# Patient Record
Sex: FEMALE | Race: WHITE | Employment: PART TIME | URBAN - METROPOLITAN AREA
[De-identification: names, ages, dates, MRNs, and addresses within clinical notes are randomized per-mention and may not be internally consistent; named-entity substitution may affect disease eponyms.]

---

## 2017-01-23 RX ORDER — CARBAMAZEPINE 200 MG/1
CAPSULE, EXTENDED RELEASE ORAL
Qty: 120 CAPSULE | Refills: 5 | Status: SHIPPED | OUTPATIENT
Start: 2017-01-23 | End: 2017-06-08 | Stop reason: SDUPTHER

## 2017-03-07 DIAGNOSIS — G43.009 MIGRAINE WITHOUT AURA AND WITHOUT STATUS MIGRAINOSUS, NOT INTRACTABLE: Primary | ICD-10-CM

## 2017-03-07 RX ORDER — BUTALBITAL, ACETAMINOPHEN AND CAFFEINE 50; 325; 40 MG/1; MG/1; MG/1
1 TABLET ORAL EVERY 6 HOURS PRN
Qty: 90 TABLET | Refills: 3 | Status: SHIPPED | OUTPATIENT
Start: 2017-03-07 | End: 2018-09-05 | Stop reason: ALTCHOICE

## 2017-03-07 RX ORDER — AMITRIPTYLINE HYDROCHLORIDE 25 MG/1
25 TABLET, FILM COATED ORAL NIGHTLY
Qty: 30 TABLET | Refills: 3 | Status: SHIPPED | OUTPATIENT
Start: 2017-03-07 | End: 2017-04-05

## 2017-04-05 ENCOUNTER — OFFICE VISIT (OUTPATIENT)
Dept: FAMILY MEDICINE CLINIC | Age: 32
End: 2017-04-05
Payer: MEDICARE

## 2017-04-05 ENCOUNTER — HOSPITAL ENCOUNTER (OUTPATIENT)
Age: 32
Setting detail: SPECIMEN
Discharge: HOME | End: 2017-04-05
Payer: MEDICARE

## 2017-04-05 VITALS
OXYGEN SATURATION: 98 % | DIASTOLIC BLOOD PRESSURE: 60 MMHG | HEART RATE: 100 BPM | TEMPERATURE: 98.2 F | SYSTOLIC BLOOD PRESSURE: 102 MMHG | WEIGHT: 112 LBS | BODY MASS INDEX: 20.49 KG/M2

## 2017-04-05 DIAGNOSIS — G40.909 SEIZURE DISORDER (HCC): ICD-10-CM

## 2017-04-05 DIAGNOSIS — G43.009 MIGRAINE WITHOUT AURA AND WITHOUT STATUS MIGRAINOSUS, NOT INTRACTABLE: Primary | ICD-10-CM

## 2017-04-05 LAB
CARBAMAZEPINE DATE LAST DOSE: ABNORMAL
CARBAMAZEPINE DOSE AMOUNT: ABNORMAL
CARBAMAZEPINE DOSE TIME: ABNORMAL
CARBAMAZEPINE LEVEL: <2.5 UG/ML (ref 4–12)

## 2017-04-05 PROCEDURE — 1036F TOBACCO NON-USER: CPT | Performed by: NURSE PRACTITIONER

## 2017-04-05 PROCEDURE — G8427 DOCREV CUR MEDS BY ELIG CLIN: HCPCS | Performed by: NURSE PRACTITIONER

## 2017-04-05 PROCEDURE — 96372 THER/PROPH/DIAG INJ SC/IM: CPT | Performed by: NURSE PRACTITIONER

## 2017-04-05 PROCEDURE — 99213 OFFICE O/P EST LOW 20 MIN: CPT | Performed by: NURSE PRACTITIONER

## 2017-04-05 PROCEDURE — G8420 CALC BMI NORM PARAMETERS: HCPCS | Performed by: NURSE PRACTITIONER

## 2017-04-05 PROCEDURE — G8599 NO ASA/ANTIPLAT THER USE RNG: HCPCS | Performed by: NURSE PRACTITIONER

## 2017-04-05 RX ORDER — KETOROLAC TROMETHAMINE 30 MG/ML
30 INJECTION, SOLUTION INTRAMUSCULAR; INTRAVENOUS ONCE
Status: COMPLETED | OUTPATIENT
Start: 2017-04-05 | End: 2017-04-05

## 2017-04-05 RX ORDER — PROPRANOLOL HYDROCHLORIDE 80 MG/1
80 CAPSULE, EXTENDED RELEASE ORAL NIGHTLY
Qty: 30 CAPSULE | Refills: 3 | Status: SHIPPED | OUTPATIENT
Start: 2017-04-05 | End: 2017-04-07

## 2017-04-05 RX ORDER — SUMATRIPTAN 20 MG/1
SPRAY NASAL
Qty: 1 INHALER | Refills: 2 | Status: SHIPPED | OUTPATIENT
Start: 2017-04-05 | End: 2017-04-07

## 2017-04-05 RX ORDER — ELETRIPTAN HYDROBROMIDE 20 MG/1
TABLET, FILM COATED ORAL
Qty: 6 TABLET | OUTPATIENT
Start: 2017-04-05

## 2017-04-05 RX ORDER — NAPROXEN 500 MG/1
500 TABLET ORAL 2 TIMES DAILY WITH MEALS
Qty: 60 TABLET | Refills: 3 | Status: SHIPPED | OUTPATIENT
Start: 2017-04-05 | End: 2017-06-27 | Stop reason: SDUPTHER

## 2017-04-05 RX ADMIN — KETOROLAC TROMETHAMINE 30 MG: 30 INJECTION, SOLUTION INTRAMUSCULAR; INTRAVENOUS at 10:35

## 2017-04-05 ASSESSMENT — ENCOUNTER SYMPTOMS
RESPIRATORY NEGATIVE: 1
ALLERGIC/IMMUNOLOGIC NEGATIVE: 1
ABDOMINAL PAIN: 0
WHEEZING: 0
GASTROINTESTINAL NEGATIVE: 1
EYES NEGATIVE: 1
DIARRHEA: 0
CONSTIPATION: 0
COUGH: 0
SHORTNESS OF BREATH: 0

## 2017-04-07 ENCOUNTER — HOSPITAL ENCOUNTER (EMERGENCY)
Age: 32
Discharge: HOME | End: 2017-04-07
Attending: EMERGENCY MEDICINE
Payer: MEDICARE

## 2017-04-07 ENCOUNTER — TELEPHONE (OUTPATIENT)
Dept: FAMILY MEDICINE CLINIC | Age: 32
End: 2017-04-07

## 2017-04-07 VITALS
DIASTOLIC BLOOD PRESSURE: 77 MMHG | RESPIRATION RATE: 14 BRPM | HEART RATE: 80 BPM | BODY MASS INDEX: 20.49 KG/M2 | SYSTOLIC BLOOD PRESSURE: 110 MMHG | TEMPERATURE: 98 F | WEIGHT: 112 LBS | OXYGEN SATURATION: 96 %

## 2017-04-07 DIAGNOSIS — R26.9 NEUROLOGIC GAIT DISORDER: ICD-10-CM

## 2017-04-07 DIAGNOSIS — G43.009 MIGRAINE WITHOUT AURA AND WITHOUT STATUS MIGRAINOSUS, NOT INTRACTABLE: Primary | ICD-10-CM

## 2017-04-07 DIAGNOSIS — G43.919 INTRACTABLE MIGRAINE WITHOUT STATUS MIGRAINOSUS, UNSPECIFIED MIGRAINE TYPE: Primary | ICD-10-CM

## 2017-04-07 PROCEDURE — 2580000003 HC RX 258: Performed by: EMERGENCY MEDICINE

## 2017-04-07 PROCEDURE — 6360000002 HC RX W HCPCS: Performed by: EMERGENCY MEDICINE

## 2017-04-07 PROCEDURE — 99283 EMERGENCY DEPT VISIT LOW MDM: CPT

## 2017-04-07 PROCEDURE — 96374 THER/PROPH/DIAG INJ IV PUSH: CPT

## 2017-04-07 PROCEDURE — 96375 TX/PRO/DX INJ NEW DRUG ADDON: CPT

## 2017-04-07 RX ORDER — 0.9 % SODIUM CHLORIDE 0.9 %
1000 INTRAVENOUS SOLUTION INTRAVENOUS ONCE
Status: COMPLETED | OUTPATIENT
Start: 2017-04-07 | End: 2017-04-07

## 2017-04-07 RX ORDER — AMITRIPTYLINE HYDROCHLORIDE 25 MG/1
25 TABLET, FILM COATED ORAL NIGHTLY
COMMUNITY
End: 2017-05-11

## 2017-04-07 RX ORDER — DEXAMETHASONE SODIUM PHOSPHATE 4 MG/ML
10 INJECTION, SOLUTION INTRA-ARTICULAR; INTRALESIONAL; INTRAMUSCULAR; INTRAVENOUS; SOFT TISSUE ONCE
Status: COMPLETED | OUTPATIENT
Start: 2017-04-07 | End: 2017-04-07

## 2017-04-07 RX ORDER — KETOROLAC TROMETHAMINE 30 MG/ML
30 INJECTION, SOLUTION INTRAMUSCULAR; INTRAVENOUS ONCE
Status: COMPLETED | OUTPATIENT
Start: 2017-04-07 | End: 2017-04-07

## 2017-04-07 RX ORDER — DIPHENHYDRAMINE HYDROCHLORIDE 50 MG/ML
25 INJECTION INTRAMUSCULAR; INTRAVENOUS ONCE
Status: COMPLETED | OUTPATIENT
Start: 2017-04-07 | End: 2017-04-07

## 2017-04-07 RX ADMIN — PROCHLORPERAZINE EDISYLATE 10 MG: 5 INJECTION INTRAMUSCULAR; INTRAVENOUS at 14:52

## 2017-04-07 RX ADMIN — KETOROLAC TROMETHAMINE 30 MG: 30 INJECTION, SOLUTION INTRAMUSCULAR at 14:50

## 2017-04-07 RX ADMIN — SODIUM CHLORIDE 1000 ML: 9 INJECTION, SOLUTION INTRAVENOUS at 14:53

## 2017-04-07 RX ADMIN — DIPHENHYDRAMINE HYDROCHLORIDE 25 MG: 50 INJECTION, SOLUTION INTRAMUSCULAR; INTRAVENOUS at 14:46

## 2017-04-07 RX ADMIN — DEXAMETHASONE SODIUM PHOSPHATE 10 MG: 4 INJECTION, SOLUTION INTRAMUSCULAR; INTRAVENOUS at 14:41

## 2017-04-07 ASSESSMENT — PAIN SCALES - GENERAL
PAINLEVEL_OUTOF10: 1
PAINLEVEL_OUTOF10: 0
PAINLEVEL_OUTOF10: 0
PAINLEVEL_OUTOF10: 7

## 2017-04-07 ASSESSMENT — PAIN SCALES - WONG BAKER: WONGBAKER_NUMERICALRESPONSE: 2

## 2017-04-07 ASSESSMENT — ENCOUNTER SYMPTOMS
EYE REDNESS: 0
COUGH: 0
RHINORRHEA: 0
NAUSEA: 1
SHORTNESS OF BREATH: 0
BACK PAIN: 0
ABDOMINAL PAIN: 0
EYE PAIN: 0
VOMITING: 0

## 2017-04-07 ASSESSMENT — PAIN DESCRIPTION - LOCATION: LOCATION: HEAD

## 2017-04-07 ASSESSMENT — PAIN DESCRIPTION - PAIN TYPE: TYPE: ACUTE PAIN

## 2017-04-13 RX ORDER — ELETRIPTAN HYDROBROMIDE 20 MG/1
TABLET, FILM COATED ORAL
Qty: 6 TABLET | Refills: 2 | Status: SHIPPED | OUTPATIENT
Start: 2017-04-13 | End: 2017-07-06 | Stop reason: SDUPTHER

## 2017-05-01 ENCOUNTER — OFFICE VISIT (OUTPATIENT)
Dept: FAMILY MEDICINE CLINIC | Age: 32
End: 2017-05-01
Payer: MEDICARE

## 2017-05-01 VITALS
OXYGEN SATURATION: 98 % | HEART RATE: 94 BPM | WEIGHT: 111 LBS | DIASTOLIC BLOOD PRESSURE: 68 MMHG | BODY MASS INDEX: 20.3 KG/M2 | SYSTOLIC BLOOD PRESSURE: 112 MMHG | TEMPERATURE: 98.3 F

## 2017-05-01 DIAGNOSIS — Z98.2 HISTORY OF BRAIN SHUNT: ICD-10-CM

## 2017-05-01 DIAGNOSIS — G93.89 ENCEPHALOMALACIA ON IMAGING STUDY: ICD-10-CM

## 2017-05-01 DIAGNOSIS — G93.89 CEREBRAL VENTRICULOMEGALY: ICD-10-CM

## 2017-05-01 DIAGNOSIS — G43.109 MIGRAINE WITH AURA AND WITHOUT STATUS MIGRAINOSUS, NOT INTRACTABLE: ICD-10-CM

## 2017-05-01 DIAGNOSIS — H65.03 BILATERAL ACUTE SEROUS OTITIS MEDIA, RECURRENCE NOT SPECIFIED: Primary | ICD-10-CM

## 2017-05-01 PROCEDURE — 99213 OFFICE O/P EST LOW 20 MIN: CPT | Performed by: NURSE PRACTITIONER

## 2017-05-01 PROCEDURE — 1036F TOBACCO NON-USER: CPT | Performed by: NURSE PRACTITIONER

## 2017-05-01 PROCEDURE — G8599 NO ASA/ANTIPLAT THER USE RNG: HCPCS | Performed by: NURSE PRACTITIONER

## 2017-05-01 PROCEDURE — G8427 DOCREV CUR MEDS BY ELIG CLIN: HCPCS | Performed by: NURSE PRACTITIONER

## 2017-05-01 PROCEDURE — G8420 CALC BMI NORM PARAMETERS: HCPCS | Performed by: NURSE PRACTITIONER

## 2017-05-01 ASSESSMENT — ENCOUNTER SYMPTOMS
ABDOMINAL PAIN: 0
COUGH: 0
DIARRHEA: 0
ALLERGIC/IMMUNOLOGIC NEGATIVE: 1
WHEEZING: 0
SHORTNESS OF BREATH: 0
EYES NEGATIVE: 1
CONSTIPATION: 0
GASTROINTESTINAL NEGATIVE: 1
RESPIRATORY NEGATIVE: 1

## 2017-05-01 ASSESSMENT — PATIENT HEALTH QUESTIONNAIRE - PHQ9
2. FEELING DOWN, DEPRESSED OR HOPELESS: 0
SUM OF ALL RESPONSES TO PHQ9 QUESTIONS 1 & 2: 0
SUM OF ALL RESPONSES TO PHQ QUESTIONS 1-9: 0
1. LITTLE INTEREST OR PLEASURE IN DOING THINGS: 0

## 2017-05-11 ENCOUNTER — OFFICE VISIT (OUTPATIENT)
Dept: OBGYN CLINIC | Age: 32
End: 2017-05-11
Payer: MEDICARE

## 2017-05-11 VITALS
RESPIRATION RATE: 16 BRPM | DIASTOLIC BLOOD PRESSURE: 68 MMHG | SYSTOLIC BLOOD PRESSURE: 104 MMHG | WEIGHT: 114 LBS | HEIGHT: 62 IN | HEART RATE: 72 BPM | BODY MASS INDEX: 20.98 KG/M2

## 2017-05-11 DIAGNOSIS — Z30.09 FAMILY PLANNING EDUCATION, GUIDANCE, AND COUNSELING: Primary | ICD-10-CM

## 2017-05-11 PROBLEM — Z86.73 H/O: STROKE: Status: ACTIVE | Noted: 2017-05-11

## 2017-05-11 PROCEDURE — G8420 CALC BMI NORM PARAMETERS: HCPCS | Performed by: NURSE PRACTITIONER

## 2017-05-11 PROCEDURE — G8599 NO ASA/ANTIPLAT THER USE RNG: HCPCS | Performed by: NURSE PRACTITIONER

## 2017-05-11 PROCEDURE — 4004F PT TOBACCO SCREEN RCVD TLK: CPT | Performed by: NURSE PRACTITIONER

## 2017-05-11 PROCEDURE — 99213 OFFICE O/P EST LOW 20 MIN: CPT | Performed by: NURSE PRACTITIONER

## 2017-05-11 PROCEDURE — G8427 DOCREV CUR MEDS BY ELIG CLIN: HCPCS | Performed by: NURSE PRACTITIONER

## 2017-06-08 RX ORDER — CARBAMAZEPINE 200 MG/1
CAPSULE, EXTENDED RELEASE ORAL
Qty: 120 CAPSULE | Refills: 0 | Status: SHIPPED | OUTPATIENT
Start: 2017-06-08 | End: 2017-08-03 | Stop reason: SDUPTHER

## 2017-06-27 RX ORDER — NAPROXEN 500 MG/1
TABLET ORAL
Qty: 60 TABLET | Refills: 3 | Status: SHIPPED | OUTPATIENT
Start: 2017-06-27 | End: 2017-11-28 | Stop reason: SDUPTHER

## 2017-06-27 RX ORDER — AMITRIPTYLINE HYDROCHLORIDE 25 MG/1
TABLET, FILM COATED ORAL
Qty: 30 TABLET | Refills: 5 | OUTPATIENT
Start: 2017-06-27

## 2017-06-27 RX ORDER — PROPRANOLOL HYDROCHLORIDE 80 MG/1
CAPSULE, EXTENDED RELEASE ORAL
Qty: 30 CAPSULE | Refills: 5 | Status: SHIPPED | OUTPATIENT
Start: 2017-06-27 | End: 2018-07-18 | Stop reason: SDUPTHER

## 2017-07-03 RX ORDER — SUMATRIPTAN 20 MG/1
SPRAY NASAL
Qty: 1 INHALER | Refills: 1 | Status: SHIPPED | OUTPATIENT
Start: 2017-07-03 | End: 2017-08-22 | Stop reason: SDUPTHER

## 2017-07-06 RX ORDER — ELETRIPTAN HYDROBROMIDE 20 MG/1
TABLET, FILM COATED ORAL
Qty: 6 TABLET | Refills: 3 | Status: SHIPPED | OUTPATIENT
Start: 2017-07-06 | End: 2018-09-05 | Stop reason: ALTCHOICE

## 2017-08-03 RX ORDER — CARBAMAZEPINE 200 MG/1
CAPSULE, EXTENDED RELEASE ORAL
Qty: 120 CAPSULE | Refills: 0 | Status: SHIPPED | OUTPATIENT
Start: 2017-08-03 | End: 2017-09-01 | Stop reason: SDUPTHER

## 2017-08-24 RX ORDER — SUMATRIPTAN 20 MG/1
SPRAY NASAL
Qty: 1 INHALER | Refills: 0 | Status: SHIPPED | OUTPATIENT
Start: 2017-08-24 | End: 2017-11-01 | Stop reason: SDUPTHER

## 2017-09-07 RX ORDER — CARBAMAZEPINE 200 MG/1
CAPSULE, EXTENDED RELEASE ORAL
Qty: 60 CAPSULE | Refills: 1 | Status: SHIPPED | OUTPATIENT
Start: 2017-09-07 | End: 2017-11-15 | Stop reason: SDUPTHER

## 2017-11-02 RX ORDER — SUMATRIPTAN 20 MG/1
SPRAY NASAL
Qty: 1 INHALER | Refills: 2 | Status: SHIPPED | OUTPATIENT
Start: 2017-11-02 | End: 2017-12-26 | Stop reason: SDUPTHER

## 2017-11-02 NOTE — TELEPHONE ENCOUNTER
Last refill 8/28/17  Last seen 5/1/17    Next Visit Date:  No future appointments.     Health Maintenance   Topic Date Due    HIV screen  12/02/1998    Pneumococcal med risk (1 of 1 - PPSV23) 12/02/2002    Flu vaccine (1) 09/01/2017    Cervical cancer screen  04/05/2018 (Originally 12/2/2004)    DTaP/Tdap/Td vaccine (2 - Td) 04/05/2022       No results found for: LABA1C          ( goal A1C is < 7)   No results found for: LABMICR  No results found for: LDLCHOLESTEROL, LDLCALC    (goal LDL is <100)   AST (U/L)   Date Value   08/03/2016 18     ALT (U/L)   Date Value   08/03/2016 19     BUN (mg/dL)   Date Value   08/03/2016 16     BP Readings from Last 3 Encounters:   05/11/17 104/68   05/01/17 112/68   04/07/17 110/77          (goal 120/80)    All Future Testing planned in [facility]              Patient Active Problem List:     Neurologic gait disorder     Cerebrovascular accident (CVA) ([address]     Seizure disorder ([address]     Migraine without aura and without status migrainosus, not intractable     Foot drop, left foot     History of brain shunt     Cerebral ventriculomegaly     Encephalomalacia on imaging study     H/O: stroke

## 2017-11-16 RX ORDER — CARBAMAZEPINE 200 MG/1
CAPSULE, EXTENDED RELEASE ORAL
Qty: 360 CAPSULE | Refills: 1 | Status: SHIPPED | OUTPATIENT
Start: 2017-11-16 | End: 2017-12-26 | Stop reason: SDUPTHER

## 2017-11-16 NOTE — TELEPHONE ENCOUNTER
Last visit 5/11/17  Next Visit Date:  No future appointments.     Health Maintenance   Topic Date Due    HIV screen  12/02/1998    Pneumococcal med risk (1 of 1 - PPSV23) 12/02/2002    Flu vaccine (1) 09/01/2017    Cervical cancer screen  04/05/2018 (Originally 12/2/2004)    DTaP/Tdap/Td vaccine (2 - Td) 04/05/2022       No results found for: LABA1C          ( goal A1C is < 7)   No results found for: LABMICR  No results found for: LDLCHOLESTEROL, LDLCALC    (goal LDL is <100)   AST (U/L)   Date Value   08/03/2016 18     ALT (U/L)   Date Value   08/03/2016 19     BUN (mg/dL)   Date Value   08/03/2016 16     BP Readings from Last 3 Encounters:   05/11/17 104/68   05/01/17 112/68   04/07/17 110/77          (goal 120/80)    All Future Testing planned in [facility]              Patient Active Problem List:     Neurologic gait disorder     Cerebrovascular accident (CVA) ([address]     Seizure disorder ([address]     Migraine without aura and without status migrainosus, not intractable     Foot drop, left foot     History of brain shunt     Cerebral ventriculomegaly     Encephalomalacia on imaging study     H/O: stroke

## 2017-11-29 RX ORDER — NAPROXEN 500 MG/1
TABLET ORAL
Qty: 60 TABLET | Refills: 0 | Status: SHIPPED | OUTPATIENT
Start: 2017-11-29 | End: 2018-06-21 | Stop reason: SDUPTHER

## 2017-12-26 RX ORDER — NAPROXEN 500 MG/1
TABLET ORAL
Qty: 60 TABLET | OUTPATIENT
Start: 2017-12-26

## 2017-12-26 NOTE — TELEPHONE ENCOUNTER
LAST APPT 5/1/17   NEEDS APPOINTMENT  Next Visit Date:  No future appointments.     Health Maintenance   Topic Date Due    HIV screen  12/02/1998    Pneumococcal med risk (1 of 1 - PPSV23) 12/02/2002    Flu vaccine (1) 09/01/2017    Cervical cancer screen  04/05/2018 (Originally 12/2/2004)    DTaP/Tdap/Td vaccine (2 - Td) 04/05/2022       No results found for: LABA1C          ( goal A1C is < 7)   No results found for: LABMICR  No results found for: LDLCHOLESTEROL, LDLCALC    (goal LDL is <100)   AST (U/L)   Date Value   08/03/2016 18     ALT (U/L)   Date Value   08/03/2016 19     BUN (mg/dL)   Date Value   08/03/2016 16     BP Readings from Last 3 Encounters:   05/11/17 104/68   05/01/17 112/68   04/07/17 110/77          (goal 120/80)    All Future Testing planned in [facility]              Patient Active Problem List:     Neurologic gait disorder     Cerebrovascular accident (CVA) ([address]     Seizure disorder ([address]     Migraine without aura and without status migrainosus, not intractable     Foot drop, left foot     History of brain shunt     Cerebral ventriculomegaly     Encephalomalacia on imaging study     H/O: stroke

## 2017-12-27 RX ORDER — NAPROXEN SODIUM 550 MG/1
TABLET ORAL
Qty: 60 TABLET | Refills: 1 | Status: SHIPPED | OUTPATIENT
Start: 2017-12-27 | End: 2018-06-19 | Stop reason: SDUPTHER

## 2017-12-27 RX ORDER — AMITRIPTYLINE HYDROCHLORIDE 25 MG/1
TABLET, FILM COATED ORAL
Qty: 30 TABLET | Refills: 5 | Status: SHIPPED | OUTPATIENT
Start: 2017-12-27 | End: 2018-07-13

## 2017-12-27 RX ORDER — CARBAMAZEPINE 200 MG/1
CAPSULE, EXTENDED RELEASE ORAL
Qty: 120 CAPSULE | Refills: 5 | Status: SHIPPED | OUTPATIENT
Start: 2017-12-27 | End: 2018-10-09 | Stop reason: SDUPTHER

## 2017-12-27 RX ORDER — SUMATRIPTAN 20 MG/1
SPRAY NASAL
Qty: 1 INHALER | Refills: 1 | Status: SHIPPED | OUTPATIENT
Start: 2017-12-27 | End: 2018-06-21 | Stop reason: SDUPTHER

## 2018-05-30 ENCOUNTER — TELEPHONE (OUTPATIENT)
Dept: FAMILY MEDICINE CLINIC | Age: 33
End: 2018-05-30

## 2018-06-19 ENCOUNTER — OFFICE VISIT (OUTPATIENT)
Dept: FAMILY MEDICINE CLINIC | Age: 33
End: 2018-06-19
Payer: COMMERCIAL

## 2018-06-19 VITALS
DIASTOLIC BLOOD PRESSURE: 78 MMHG | BODY MASS INDEX: 21.38 KG/M2 | TEMPERATURE: 97.9 F | WEIGHT: 116.2 LBS | OXYGEN SATURATION: 99 % | HEART RATE: 89 BPM | HEIGHT: 62 IN | SYSTOLIC BLOOD PRESSURE: 120 MMHG

## 2018-06-19 DIAGNOSIS — Z11.4 SCREENING FOR HIV (HUMAN IMMUNODEFICIENCY VIRUS): ICD-10-CM

## 2018-06-19 DIAGNOSIS — G43.009 MIGRAINE WITHOUT AURA AND WITHOUT STATUS MIGRAINOSUS, NOT INTRACTABLE: Primary | ICD-10-CM

## 2018-06-19 DIAGNOSIS — Z13.220 SCREENING FOR HYPERLIPIDEMIA: ICD-10-CM

## 2018-06-19 DIAGNOSIS — Z79.899 MEDICATION MANAGEMENT: ICD-10-CM

## 2018-06-19 PROCEDURE — G8599 NO ASA/ANTIPLAT THER USE RNG: HCPCS | Performed by: NURSE PRACTITIONER

## 2018-06-19 PROCEDURE — G8427 DOCREV CUR MEDS BY ELIG CLIN: HCPCS | Performed by: NURSE PRACTITIONER

## 2018-06-19 PROCEDURE — 4004F PT TOBACCO SCREEN RCVD TLK: CPT | Performed by: NURSE PRACTITIONER

## 2018-06-19 PROCEDURE — G8420 CALC BMI NORM PARAMETERS: HCPCS | Performed by: NURSE PRACTITIONER

## 2018-06-19 PROCEDURE — 99213 OFFICE O/P EST LOW 20 MIN: CPT | Performed by: NURSE PRACTITIONER

## 2018-06-19 ASSESSMENT — ENCOUNTER SYMPTOMS
CONSTIPATION: 0
COUGH: 0
WHEEZING: 0
RESPIRATORY NEGATIVE: 1
ABDOMINAL PAIN: 0
SHORTNESS OF BREATH: 0
DIARRHEA: 0
EYES NEGATIVE: 1
GASTROINTESTINAL NEGATIVE: 1
ALLERGIC/IMMUNOLOGIC NEGATIVE: 1

## 2018-06-19 ASSESSMENT — PATIENT HEALTH QUESTIONNAIRE - PHQ9
SUM OF ALL RESPONSES TO PHQ9 QUESTIONS 1 & 2: 0
SUM OF ALL RESPONSES TO PHQ QUESTIONS 1-9: 0
2. FEELING DOWN, DEPRESSED OR HOPELESS: 0
1. LITTLE INTEREST OR PLEASURE IN DOING THINGS: 0

## 2018-06-21 RX ORDER — SUMATRIPTAN 20 MG/1
SPRAY NASAL
Qty: 1 INHALER | Refills: 1 | Status: SHIPPED | OUTPATIENT
Start: 2018-06-21 | End: 2018-07-18 | Stop reason: SDUPTHER

## 2018-06-21 RX ORDER — NAPROXEN 500 MG/1
TABLET ORAL
Qty: 60 TABLET | Refills: 1 | Status: SHIPPED | OUTPATIENT
Start: 2018-06-21 | End: 2018-09-05 | Stop reason: ALTCHOICE

## 2018-06-25 ENCOUNTER — HOSPITAL ENCOUNTER (OUTPATIENT)
Age: 33
Discharge: HOME | End: 2018-06-25
Payer: COMMERCIAL

## 2018-06-25 DIAGNOSIS — Z11.4 SCREENING FOR HIV (HUMAN IMMUNODEFICIENCY VIRUS): ICD-10-CM

## 2018-06-25 DIAGNOSIS — G43.009 MIGRAINE WITHOUT AURA AND WITHOUT STATUS MIGRAINOSUS, NOT INTRACTABLE: ICD-10-CM

## 2018-06-25 DIAGNOSIS — Z79.899 MEDICATION MANAGEMENT: ICD-10-CM

## 2018-06-25 DIAGNOSIS — Z13.220 SCREENING FOR HYPERLIPIDEMIA: ICD-10-CM

## 2018-06-25 LAB
ANION GAP SERPL CALCULATED.3IONS-SCNC: 10 MMOL/L (ref 9–17)
BUN BLDV-MCNC: 16 MG/DL (ref 6–20)
BUN/CREAT BLD: NORMAL (ref 9–20)
CALCIUM SERPL-MCNC: 9.1 MG/DL (ref 8.6–10.4)
CHLORIDE BLD-SCNC: 99 MMOL/L (ref 98–107)
CHOLESTEROL/HDL RATIO: 3.9
CHOLESTEROL: 197 MG/DL
CO2: 27 MMOL/L (ref 20–31)
CREAT SERPL-MCNC: 0.56 MG/DL (ref 0.5–0.9)
GFR AFRICAN AMERICAN: >60 ML/MIN
GFR NON-AFRICAN AMERICAN: >60 ML/MIN
GFR SERPL CREATININE-BSD FRML MDRD: NORMAL ML/MIN/{1.73_M2}
GFR SERPL CREATININE-BSD FRML MDRD: NORMAL ML/MIN/{1.73_M2}
GLUCOSE BLD-MCNC: 84 MG/DL (ref 70–99)
HCT VFR BLD CALC: 45.5 % (ref 36–46)
HDLC SERPL-MCNC: 51 MG/DL
HEMOGLOBIN: 15.6 G/DL (ref 12–16)
HIV AG/AB: NONREACTIVE
LDL CHOLESTEROL: 131 MG/DL (ref 0–130)
MCH RBC QN AUTO: 30.7 PG (ref 26–34)
MCHC RBC AUTO-ENTMCNC: 34.3 G/DL (ref 31–37)
MCV RBC AUTO: 89.4 FL (ref 80–100)
NRBC AUTOMATED: NORMAL PER 100 WBC
PDW BLD-RTO: 12.6 % (ref 11.5–14.9)
PLATELET # BLD: 229 K/UL (ref 150–450)
PMV BLD AUTO: 8 FL (ref 6–12)
POTASSIUM SERPL-SCNC: 4.4 MMOL/L (ref 3.7–5.3)
RBC # BLD: 5.08 M/UL (ref 4–5.2)
SODIUM BLD-SCNC: 136 MMOL/L (ref 135–144)
TRIGL SERPL-MCNC: 76 MG/DL
VLDLC SERPL CALC-MCNC: ABNORMAL MG/DL (ref 1–30)
WBC # BLD: 5 K/UL (ref 3.5–11)

## 2018-06-25 PROCEDURE — 36415 COLL VENOUS BLD VENIPUNCTURE: CPT

## 2018-06-25 PROCEDURE — 87389 HIV-1 AG W/HIV-1&-2 AB AG IA: CPT

## 2018-06-25 PROCEDURE — 80048 BASIC METABOLIC PNL TOTAL CA: CPT

## 2018-06-25 PROCEDURE — 85027 COMPLETE CBC AUTOMATED: CPT

## 2018-06-25 PROCEDURE — 80061 LIPID PANEL: CPT

## 2018-07-13 RX ORDER — AMITRIPTYLINE HYDROCHLORIDE 25 MG/1
TABLET, FILM COATED ORAL
Qty: 30 TABLET | Refills: 5 | Status: SHIPPED | OUTPATIENT
Start: 2018-07-13 | End: 2018-09-05 | Stop reason: ALTCHOICE

## 2018-07-19 RX ORDER — PROPRANOLOL HYDROCHLORIDE 80 MG/1
CAPSULE, EXTENDED RELEASE ORAL
Qty: 30 CAPSULE | Refills: 5 | Status: SHIPPED | OUTPATIENT
Start: 2018-07-19 | End: 2018-09-05 | Stop reason: ALTCHOICE

## 2018-07-19 RX ORDER — SUMATRIPTAN 20 MG/1
SPRAY NASAL
Qty: 1 INHALER | Refills: 5 | Status: SHIPPED | OUTPATIENT
Start: 2018-07-19 | End: 2018-09-05 | Stop reason: ALTCHOICE

## 2018-07-23 RX ORDER — NAPROXEN SODIUM 550 MG/1
TABLET ORAL
Qty: 180 TABLET | OUTPATIENT
Start: 2018-07-23

## 2018-08-21 ENCOUNTER — OFFICE VISIT (OUTPATIENT)
Dept: FAMILY MEDICINE CLINIC | Age: 33
End: 2018-08-21
Payer: COMMERCIAL

## 2018-08-21 VITALS
BODY MASS INDEX: 21.31 KG/M2 | DIASTOLIC BLOOD PRESSURE: 78 MMHG | TEMPERATURE: 97.8 F | OXYGEN SATURATION: 99 % | HEART RATE: 88 BPM | WEIGHT: 115.8 LBS | SYSTOLIC BLOOD PRESSURE: 128 MMHG | HEIGHT: 62 IN

## 2018-08-21 DIAGNOSIS — G43.009 MIGRAINE WITHOUT AURA AND WITHOUT STATUS MIGRAINOSUS, NOT INTRACTABLE: Primary | ICD-10-CM

## 2018-08-21 PROCEDURE — 96372 THER/PROPH/DIAG INJ SC/IM: CPT | Performed by: NURSE PRACTITIONER

## 2018-08-21 PROCEDURE — 99213 OFFICE O/P EST LOW 20 MIN: CPT | Performed by: NURSE PRACTITIONER

## 2018-08-21 RX ORDER — KETOROLAC TROMETHAMINE 30 MG/ML
30 INJECTION, SOLUTION INTRAMUSCULAR; INTRAVENOUS ONCE
Status: COMPLETED | OUTPATIENT
Start: 2018-08-21 | End: 2018-08-21

## 2018-08-21 RX ORDER — DIPHENHYDRAMINE HYDROCHLORIDE 50 MG/ML
25 INJECTION INTRAMUSCULAR; INTRAVENOUS ONCE
Status: COMPLETED | OUTPATIENT
Start: 2018-08-21 | End: 2018-08-21

## 2018-08-21 RX ORDER — PROMETHAZINE HYDROCHLORIDE 25 MG/ML
12.5 INJECTION, SOLUTION INTRAMUSCULAR; INTRAVENOUS ONCE
Status: COMPLETED | OUTPATIENT
Start: 2018-08-21 | End: 2018-08-21

## 2018-08-21 RX ADMIN — DIPHENHYDRAMINE HYDROCHLORIDE 25 MG: 50 INJECTION INTRAMUSCULAR; INTRAVENOUS at 14:11

## 2018-08-21 RX ADMIN — PROMETHAZINE HYDROCHLORIDE 12.5 MG: 25 INJECTION, SOLUTION INTRAMUSCULAR; INTRAVENOUS at 14:12

## 2018-08-21 RX ADMIN — KETOROLAC TROMETHAMINE 30 MG: 30 INJECTION, SOLUTION INTRAMUSCULAR; INTRAVENOUS at 14:10

## 2018-08-21 ASSESSMENT — ENCOUNTER SYMPTOMS
EYES NEGATIVE: 1
CONSTIPATION: 0
ABDOMINAL PAIN: 0
GASTROINTESTINAL NEGATIVE: 1
ALLERGIC/IMMUNOLOGIC NEGATIVE: 1
RESPIRATORY NEGATIVE: 1
COUGH: 0
DIARRHEA: 0

## 2018-08-22 NOTE — PROGRESS NOTES
[address] [place] [id number]  Dept: [phone]  Dept Fax: [phone]      Chuckie Dancer is a 27 y.o. female who presents today for her medical conditions/complaints as noted below. Chuckie Dancer is c/o of Migraine (Pt states the past two days. Pt has only tried the [employer] but states she has the medications at home. Pt has nausea, dizziness, unable to hold head up, neck pain) and Results (Labs 6/25/18)            HPI:     HPI  Marisel Scherer is here re migraines. She is not getting relief an is in need of a neurologist for her migraines. She would like a cocktail today.    No results found for: LABA1C          ( goal A1C is < 7)   No results found for: LABMICR  LDL Cholesterol (mg/dL)   Date Value   06/25/2018 131 (H)       (goal LDL is <100)   AST (U/L)   Date Value   08/03/2016 18     ALT (U/L)   Date Value   08/03/2016 19     BUN (mg/dL)   Date Value   06/25/2018 16     BP Readings from Last 3 Encounters:   08/21/18 128/78   06/19/18 120/78   05/11/17 104/68          (goal 120/80)    Past Medical History:   Diagnosis Date    Meningitis spinal     Seizures (HCC)     Stroke [facility])       Past Surgical History:   Procedure Laterality Date    BRAIN SURGERY      cyst and fluid build up    SHUNT REMOVAL         Family History   Problem Relation Age of Onset    Heart Attack Father     High Blood Pressure Maternal Grandmother     Cancer Maternal Grandfather        Social History   Substance Use Topics    Smoking status: Light Tobacco Smoker    Smokeless tobacco: Never Used    Alcohol use Yes      Comment: occas      Current Outpatient Prescriptions   Medication Sig Dispense Refill    amitriptyline (ELAVIL) 25 MG tablet TAKE 1 TABLET BY MOUTH NIGHTLY 30 tablet 5    carBAMazepine (CARBATROL) 200 MG extended release capsule TAKE 2 CAPSULES BY MOUTH 2 TIMES DAILY 120 capsule 5    butalbital-acetaminophen-caffeine (FIORICET, ESGIC) -40 MG per

## 2018-09-05 ENCOUNTER — OFFICE VISIT (OUTPATIENT)
Dept: NEUROLOGY | Age: 33
End: 2018-09-05
Payer: COMMERCIAL

## 2018-09-05 VITALS
HEART RATE: 91 BPM | DIASTOLIC BLOOD PRESSURE: 79 MMHG | HEIGHT: 62 IN | WEIGHT: 114.6 LBS | SYSTOLIC BLOOD PRESSURE: 110 MMHG | BODY MASS INDEX: 21.09 KG/M2

## 2018-09-05 DIAGNOSIS — G44.59 HEADACHE SYNDROME, COMPLICATED: Primary | ICD-10-CM

## 2018-09-05 DIAGNOSIS — G40.909 SEIZURE DISORDER (HCC): ICD-10-CM

## 2018-09-05 DIAGNOSIS — Z86.69 HISTORY OF CYST OF BRAIN: ICD-10-CM

## 2018-09-05 PROCEDURE — 99204 OFFICE O/P NEW MOD 45 MIN: CPT | Performed by: PSYCHIATRY & NEUROLOGY

## 2018-09-05 RX ORDER — AMITRIPTYLINE HYDROCHLORIDE 25 MG/1
TABLET, FILM COATED ORAL
Qty: 30 TABLET | Refills: 3 | Status: SHIPPED | OUTPATIENT
Start: 2018-09-05 | End: 2019-02-12 | Stop reason: SDUPTHER

## 2018-09-05 RX ORDER — AMITRIPTYLINE HYDROCHLORIDE 10 MG/1
10 TABLET, FILM COATED ORAL NIGHTLY PRN
Qty: 7 TABLET | Refills: 0 | Status: SHIPPED | OUTPATIENT
Start: 2018-09-05 | End: 2019-02-15 | Stop reason: ALTCHOICE

## 2018-09-05 RX ORDER — LANOLIN ALCOHOL/MO/W.PET/CERES
400 CREAM (GRAM) TOPICAL DAILY
Qty: 30 TABLET | Refills: 3 | Status: SHIPPED | OUTPATIENT
Start: 2018-09-05 | End: 2019-04-23 | Stop reason: SDUPTHER

## 2018-09-05 NOTE — PATIENT INSTRUCTIONS
1. Magnesium oxide 400mg daily for headache and constipation. 2. After one week, start 10mg Elavil at bedtime for 1 week, increase to 25mg at bedtime daily after one week.    3. Keep headache log    Return in 4-6 weeks    Murphy Miller MD, MS

## 2018-09-05 NOTE — PROGRESS NOTES
Procedure Laterality Date    BRAIN SURGERY      cyst and fluid build up    SHUNT REMOVAL          SOCIAL HISTORY:     Social History     Social History    Marital status: Single     Spouse name: N/A    Number of children: N/A    Years of education: N/A     Occupational History    Not on file. Social History Main Topics    Smoking status: Light Tobacco Smoker     Packs/day: 0.25    Smokeless tobacco: Never Used    Alcohol use No    Drug use: No    Sexual activity: Not on file     Other Topics Concern    Not on file     Social History Narrative    No narrative on file       MEDICATIONS:     Current Outpatient Prescriptions   Medication Sig Dispense Refill    carBAMazepine (CARBATROL) 200 MG extended release capsule TAKE 2 CAPSULES BY MOUTH 2 TIMES DAILY 120 capsule 5     No current facility-administered medications for this visit.          ALLERGIES:     Allergies   Allergen Reactions    Bactrim [Sulfamethoxazole-Trimethoprim] Hives     hives         REVIEW OF SYSTEMS:      CONSTITUTIONAL Weight change: absent, Appetite change: present, Fatigue: absent    HEENT Ears: normal, Visual disturbance: absent    RESPIRATORY Shortness of breath: absent, Cough: absent    CARDIOVASCULAR Chest pain: absent, Leg swelling :absent    GI Constipation: absent, Diarrhea: absent, Swallowing change: absent     Urinary frequency: absent, Urinary urgency: absent, Urinary incontinence: absent    MUSCULOSKELETAL Neck pain: absent, Back pain: present, Stiffness: absent, Muscle pain: absent, Joint pain: absent Restless legs: absent    DERMATOLOGIC Hair loss: absent, Skin changes: absent    NEUROLOGIC Memory loss: absent, Confusion: absent, Seizures: present Trouble walking or imbalance: absent, Dizziness: present, Weakness: present, Numbness: absent Tremor: absent, Spasm: absent, Speech difficulty: absent, Headache: present, Light sensitivity: present    PSYCHIATRIC Anxiety: absent, Hallucination: absent, Mood disorder: absent    HEMATOLOGIC Abnormal bleeding: absent, Anemia: absent, Clotting disorder: absent, Lymph gland changes: absent     VITALS  /79 (Site: Left Arm, Position: Sitting)   Pulse 91   Ht 5' 2\" (1.575 m)   Wt 114 lb 9.6 oz (52 kg)   BMI 20.96 kg/m²        PHYSICAL EXAMINATIONS:     General appearance: cooperative  Skin: no rash or skin lesions. HEENT: normocephalic  Optic Fundi: deferred  Neck: supple, no cervcical adenopathy or carotid bruit  Lungs: clear to auscultation  Heart: Regular rate and rhythm, normal S1, S2. No murmurs, clicks or gallops. Peripheral pulses: radial pulses palpable  Abdominal: BS present, soft, NT, ND  Extremities: contracture on left wrist, fingers, and left ankle. NEUROLOGICAL EXAMINATION:     MS: awake, alert and oriented. No aphasia, dysarthria, or neglect  CNs: PERRLA, EOMI, VF full, sensation intact, face slightly asymmetric, hearing intact, soft palate rises on phonation, sternocleidomastoid and trapezius intact. Tongue midline, no fasciculations. Motor: no abnormal movements, tone and bulk okay. RUE: delta 5/5, biceps 5/5, triceps 5/5,  5/5  LUE: delta 5/5, biceps 5/5, triceps 4+/5,  4+/5  RLE: hf 5/5, ke 5/5, df 5/5, pf 5/5  LLE: hf 4+/5, ke 4+/5, df 4+/5, pf 4+/5  Reflexes: 2+ on right, brisk on left knee,  babinski not present. Coordination: FNF no dysmetria, heel to shin okay, CARLY okay, negative Rhomberg. Gait: scissor gait, limp on left, Tandem deferred  Sensory: Normal to light touch/temp/pp/vibration, intact joint position sense, no extinction. ASSESSMENT/PLAN:     //Complicated headache syndrome  - history of multiple brain surgeries.    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   - pt was s/p multiple surgeries and shunt placement/removal.       RTC in 4-5 weeks      Thank you for referring Ms. Han to me, shall you have any questions, please do not hesitate to let me know. Thank you.     Sincerely,    Scar Oneal MD, MS

## 2018-10-10 NOTE — TELEPHONE ENCOUNTER
Last visit: 8/21/18  Last Med refill: 12/27/17    Next Visit Date:  No future appointments.     Health Maintenance   Topic Date Due    Flu vaccine (1) 09/01/2018    Cervical cancer screen  06/19/2019 (Originally 12/2/2004)    Pneumococcal med risk (1 of 1 - PPSV23) 06/19/2019 (Originally 12/2/2002)    DTaP/Tdap/Td vaccine (2 - Td) 04/05/2022    HIV screen  Completed       No results found for: LABA1C          ( goal A1C is < 7)   No results found for: LABMICR  LDL Cholesterol (mg/dL)   Date Value   06/25/2018 131 (H)       (goal LDL is <100)   AST (U/L)   Date Value   08/03/2016 18     ALT (U/L)   Date Value   08/03/2016 19     BUN (mg/dL)   Date Value   06/25/2018 16     BP Readings from Last 3 Encounters:   09/05/18 110/79   08/21/18 128/78   06/19/18 120/78          (goal 120/80)    All Future Testing planned in [facility]              Patient Active Problem List:     Neurologic gait disorder     Cerebrovascular accident (CVA) ([facility]     Seizure disorder ([facility]     Migraine without aura and without status migrainosus, not intractable     Foot drop, left foot     History of brain shunt     Cerebral ventriculomegaly     Encephalomalacia on imaging study     H/O: stroke

## 2018-10-15 RX ORDER — CARBAMAZEPINE 200 MG/1
CAPSULE, EXTENDED RELEASE ORAL
Qty: 120 CAPSULE | Refills: 1 | Status: SHIPPED | OUTPATIENT
Start: 2018-10-15 | End: 2018-12-27 | Stop reason: SDUPTHER

## 2018-11-29 RX ORDER — NAPROXEN SODIUM 550 MG/1
TABLET ORAL
Qty: 180 TABLET | Refills: 1 | OUTPATIENT
Start: 2018-11-29

## 2018-11-29 RX ORDER — PROPRANOLOL HYDROCHLORIDE 80 MG/1
CAPSULE, EXTENDED RELEASE ORAL
Qty: 90 CAPSULE | Refills: 1 | OUTPATIENT
Start: 2018-11-29

## 2018-12-31 RX ORDER — CARBAMAZEPINE 200 MG/1
CAPSULE, EXTENDED RELEASE ORAL
Qty: 120 CAPSULE | Refills: 0 | Status: SHIPPED | OUTPATIENT
Start: 2018-12-31 | End: 2019-01-30 | Stop reason: SDUPTHER

## 2019-01-25 ENCOUNTER — APPOINTMENT (OUTPATIENT)
Dept: CT IMAGING | Age: 34
End: 2019-01-25
Payer: COMMERCIAL

## 2019-01-25 ENCOUNTER — APPOINTMENT (OUTPATIENT)
Dept: GENERAL RADIOLOGY | Age: 34
End: 2019-01-25
Payer: COMMERCIAL

## 2019-01-25 ENCOUNTER — HOSPITAL ENCOUNTER (EMERGENCY)
Age: 34
Discharge: HOME | End: 2019-01-26
Attending: EMERGENCY MEDICINE
Payer: COMMERCIAL

## 2019-01-25 DIAGNOSIS — H81.10 BENIGN PAROXYSMAL POSITIONAL VERTIGO, UNSPECIFIED LATERALITY: Primary | ICD-10-CM

## 2019-01-25 DIAGNOSIS — G43.909 MIGRAINE WITHOUT STATUS MIGRAINOSUS, NOT INTRACTABLE, UNSPECIFIED MIGRAINE TYPE: ICD-10-CM

## 2019-01-25 LAB
ABSOLUTE EOS #: 0.1 K/UL (ref 0–0.4)
ABSOLUTE IMMATURE GRANULOCYTE: ABNORMAL K/UL (ref 0–0.3)
ABSOLUTE LYMPH #: 1.8 K/UL (ref 1–4.8)
ABSOLUTE MONO #: 0.4 K/UL (ref 0.1–1.3)
ALBUMIN SERPL-MCNC: 4.5 G/DL (ref 3.5–5.2)
ALBUMIN/GLOBULIN RATIO: ABNORMAL (ref 1–2.5)
ALP BLD-CCNC: 93 U/L (ref 35–104)
ALT SERPL-CCNC: 21 U/L (ref 5–33)
ANION GAP SERPL CALCULATED.3IONS-SCNC: 12 MMOL/L (ref 9–17)
AST SERPL-CCNC: 22 U/L
BASOPHILS # BLD: 1 % (ref 0–2)
BASOPHILS ABSOLUTE: 0.1 K/UL (ref 0–0.2)
BILIRUB SERPL-MCNC: 0.15 MG/DL (ref 0.3–1.2)
BNP INTERPRETATION: NORMAL
BUN BLDV-MCNC: 13 MG/DL (ref 6–20)
BUN/CREAT BLD: ABNORMAL (ref 9–20)
CALCIUM SERPL-MCNC: 8.5 MG/DL (ref 8.6–10.4)
CHLORIDE BLD-SCNC: 101 MMOL/L (ref 98–107)
CO2: 23 MMOL/L (ref 20–31)
CREAT SERPL-MCNC: 0.55 MG/DL (ref 0.5–0.9)
DIFFERENTIAL TYPE: ABNORMAL
EKG ATRIAL RATE: 98 BPM
EKG P AXIS: 70 DEGREES
EKG P-R INTERVAL: 160 MS
EKG Q-T INTERVAL: 352 MS
EKG QRS DURATION: 82 MS
EKG QTC CALCULATION (BAZETT): 449 MS
EKG R AXIS: 95 DEGREES
EKG T AXIS: 53 DEGREES
EKG VENTRICULAR RATE: 98 BPM
EOSINOPHILS RELATIVE PERCENT: 3 % (ref 0–4)
GFR AFRICAN AMERICAN: >60 ML/MIN
GFR NON-AFRICAN AMERICAN: >60 ML/MIN
GFR SERPL CREATININE-BSD FRML MDRD: ABNORMAL ML/MIN/{1.73_M2}
GFR SERPL CREATININE-BSD FRML MDRD: ABNORMAL ML/MIN/{1.73_M2}
GLUCOSE BLD-MCNC: 120 MG/DL (ref 70–99)
HCG QUALITATIVE: NEGATIVE
HCT VFR BLD CALC: 44.5 % (ref 36–46)
HEMOGLOBIN: 15.4 G/DL (ref 12–16)
IMMATURE GRANULOCYTES: ABNORMAL %
INR BLD: 1
LYMPHOCYTES # BLD: 33 % (ref 24–44)
MCH RBC QN AUTO: 30.4 PG (ref 26–34)
MCHC RBC AUTO-ENTMCNC: 34.5 G/DL (ref 31–37)
MCV RBC AUTO: 88 FL (ref 80–100)
MONOCYTES # BLD: 8 % (ref 1–7)
NRBC AUTOMATED: ABNORMAL PER 100 WBC
PARTIAL THROMBOPLASTIN TIME: 36.7 SEC (ref 24–36)
PDW BLD-RTO: 12.9 % (ref 11.5–14.9)
PLATELET # BLD: 196 K/UL (ref 150–450)
PLATELET ESTIMATE: ABNORMAL
PMV BLD AUTO: 7.8 FL (ref 6–12)
POTASSIUM SERPL-SCNC: 3.9 MMOL/L (ref 3.7–5.3)
PRO-BNP: 59 PG/ML
PROTHROMBIN TIME: 13.5 SEC (ref 11.8–14.6)
RBC # BLD: 5.06 M/UL (ref 4–5.2)
RBC # BLD: ABNORMAL 10*6/UL
SEG NEUTROPHILS: 55 % (ref 36–66)
SEGMENTED NEUTROPHILS ABSOLUTE COUNT: 2.9 K/UL (ref 1.3–9.1)
SODIUM BLD-SCNC: 136 MMOL/L (ref 135–144)
TOTAL PROTEIN: 8 G/DL (ref 6.4–8.3)
TROPONIN INTERP: NORMAL
TROPONIN T: NORMAL NG/ML
TROPONIN, HIGH SENSITIVITY: <6 NG/L (ref 0–14)
WBC # BLD: 5.4 K/UL (ref 3.5–11)
WBC # BLD: ABNORMAL 10*3/UL

## 2019-01-25 PROCEDURE — 84703 CHORIONIC GONADOTROPIN ASSAY: CPT

## 2019-01-25 PROCEDURE — 83880 ASSAY OF NATRIURETIC PEPTIDE: CPT

## 2019-01-25 PROCEDURE — 85025 COMPLETE CBC W/AUTO DIFF WBC: CPT

## 2019-01-25 PROCEDURE — 6360000002 HC RX W HCPCS: Performed by: EMERGENCY MEDICINE

## 2019-01-25 PROCEDURE — 85610 PROTHROMBIN TIME: CPT

## 2019-01-25 PROCEDURE — 93005 ELECTROCARDIOGRAM TRACING: CPT

## 2019-01-25 PROCEDURE — 85730 THROMBOPLASTIN TIME PARTIAL: CPT

## 2019-01-25 PROCEDURE — 96375 TX/PRO/DX INJ NEW DRUG ADDON: CPT

## 2019-01-25 PROCEDURE — 80053 COMPREHEN METABOLIC PANEL: CPT

## 2019-01-25 PROCEDURE — 71045 X-RAY EXAM CHEST 1 VIEW: CPT

## 2019-01-25 PROCEDURE — 99284 EMERGENCY DEPT VISIT MOD MDM: CPT

## 2019-01-25 PROCEDURE — 36415 COLL VENOUS BLD VENIPUNCTURE: CPT

## 2019-01-25 PROCEDURE — 84484 ASSAY OF TROPONIN QUANT: CPT

## 2019-01-25 PROCEDURE — 96374 THER/PROPH/DIAG INJ IV PUSH: CPT

## 2019-01-25 PROCEDURE — 70450 CT HEAD/BRAIN W/O DYE: CPT

## 2019-01-25 PROCEDURE — 2580000003 HC RX 258: Performed by: EMERGENCY MEDICINE

## 2019-01-25 RX ORDER — 0.9 % SODIUM CHLORIDE 0.9 %
1000 INTRAVENOUS SOLUTION INTRAVENOUS ONCE
Status: COMPLETED | OUTPATIENT
Start: 2019-01-25 | End: 2019-01-26

## 2019-01-25 RX ORDER — SODIUM CHLORIDE 0.9 % (FLUSH) 0.9 %
10 SYRINGE (ML) INJECTION PRN
Status: DISCONTINUED | OUTPATIENT
Start: 2019-01-25 | End: 2019-01-25

## 2019-01-25 RX ORDER — DIPHENHYDRAMINE HYDROCHLORIDE 50 MG/ML
50 INJECTION INTRAMUSCULAR; INTRAVENOUS ONCE
Status: COMPLETED | OUTPATIENT
Start: 2019-01-25 | End: 2019-01-25

## 2019-01-25 RX ORDER — 0.9 % SODIUM CHLORIDE 0.9 %
80 INTRAVENOUS SOLUTION INTRAVENOUS ONCE
Status: DISCONTINUED | OUTPATIENT
Start: 2019-01-25 | End: 2019-01-25

## 2019-01-25 RX ORDER — METOCLOPRAMIDE HYDROCHLORIDE 5 MG/ML
10 INJECTION INTRAMUSCULAR; INTRAVENOUS ONCE
Status: COMPLETED | OUTPATIENT
Start: 2019-01-25 | End: 2019-01-25

## 2019-01-25 RX ADMIN — METOCLOPRAMIDE 10 MG: 5 INJECTION, SOLUTION INTRAMUSCULAR; INTRAVENOUS at 22:02

## 2019-01-25 RX ADMIN — DIPHENHYDRAMINE HYDROCHLORIDE 50 MG: 50 INJECTION INTRAMUSCULAR; INTRAVENOUS at 22:02

## 2019-01-25 RX ADMIN — SODIUM CHLORIDE 1000 ML: 9 INJECTION, SOLUTION INTRAVENOUS at 22:02

## 2019-01-26 VITALS
SYSTOLIC BLOOD PRESSURE: 145 MMHG | HEIGHT: 62 IN | HEART RATE: 120 BPM | BODY MASS INDEX: 20.98 KG/M2 | RESPIRATION RATE: 16 BRPM | DIASTOLIC BLOOD PRESSURE: 98 MMHG | OXYGEN SATURATION: 99 % | TEMPERATURE: 98 F | WEIGHT: 114 LBS

## 2019-01-26 LAB
TROPONIN INTERP: NORMAL
TROPONIN T: NORMAL NG/ML
TROPONIN, HIGH SENSITIVITY: <6 NG/L (ref 0–14)

## 2019-01-26 RX ORDER — MECLIZINE HYDROCHLORIDE 25 MG/1
25 TABLET ORAL 3 TIMES DAILY PRN
Qty: 30 TABLET | Refills: 0 | Status: SHIPPED | OUTPATIENT
Start: 2019-01-26 | End: 2019-02-05

## 2019-01-30 RX ORDER — CARBAMAZEPINE 200 MG/1
CAPSULE, EXTENDED RELEASE ORAL
Qty: 120 CAPSULE | Refills: 0 | Status: SHIPPED | OUTPATIENT
Start: 2019-01-30 | End: 2019-04-29

## 2019-02-15 RX ORDER — AMITRIPTYLINE HYDROCHLORIDE 25 MG/1
TABLET, FILM COATED ORAL
Qty: 30 TABLET | Refills: 0 | Status: SHIPPED | OUTPATIENT
Start: 2019-02-15 | End: 2019-08-28 | Stop reason: SDUPTHER

## 2019-02-27 RX ORDER — CARBAMAZEPINE 200 MG/1
CAPSULE, EXTENDED RELEASE ORAL
Qty: 120 CAPSULE | OUTPATIENT
Start: 2019-02-27

## 2019-04-03 ENCOUNTER — APPOINTMENT (OUTPATIENT)
Dept: CT IMAGING | Age: 34
End: 2019-04-03
Payer: COMMERCIAL

## 2019-04-03 ENCOUNTER — HOSPITAL ENCOUNTER (EMERGENCY)
Age: 34
Discharge: HOME | End: 2019-04-03
Attending: EMERGENCY MEDICINE
Payer: COMMERCIAL

## 2019-04-03 VITALS
BODY MASS INDEX: 21.16 KG/M2 | DIASTOLIC BLOOD PRESSURE: 85 MMHG | WEIGHT: 115 LBS | HEART RATE: 89 BPM | TEMPERATURE: 98.5 F | RESPIRATION RATE: 14 BRPM | OXYGEN SATURATION: 99 % | HEIGHT: 62 IN | SYSTOLIC BLOOD PRESSURE: 120 MMHG

## 2019-04-03 DIAGNOSIS — M54.12 CERVICAL RADICULOPATHY: Primary | ICD-10-CM

## 2019-04-03 PROCEDURE — 6360000002 HC RX W HCPCS: Performed by: EMERGENCY MEDICINE

## 2019-04-03 PROCEDURE — 72125 CT NECK SPINE W/O DYE: CPT

## 2019-04-03 PROCEDURE — 99283 EMERGENCY DEPT VISIT LOW MDM: CPT

## 2019-04-03 PROCEDURE — 70450 CT HEAD/BRAIN W/O DYE: CPT

## 2019-04-03 PROCEDURE — 96372 THER/PROPH/DIAG INJ SC/IM: CPT

## 2019-04-03 RX ORDER — ORPHENADRINE CITRATE 30 MG/ML
60 INJECTION INTRAMUSCULAR; INTRAVENOUS ONCE
Status: COMPLETED | OUTPATIENT
Start: 2019-04-03 | End: 2019-04-03

## 2019-04-03 RX ORDER — CYCLOBENZAPRINE HCL 10 MG
10 TABLET ORAL 3 TIMES DAILY PRN
Qty: 20 TABLET | Refills: 0 | Status: SHIPPED | OUTPATIENT
Start: 2019-04-03 | End: 2019-04-13

## 2019-04-03 RX ADMIN — ORPHENADRINE CITRATE 60 MG: 30 INJECTION INTRAMUSCULAR; INTRAVENOUS at 20:54

## 2019-04-03 ASSESSMENT — ENCOUNTER SYMPTOMS
COLOR CHANGE: 0
COUGH: 0
EYE PAIN: 0
VOMITING: 0
FACIAL SWELLING: 0
NAUSEA: 0
EYE DISCHARGE: 0
DIARRHEA: 0
SINUS PRESSURE: 0
SORE THROAT: 0
SHORTNESS OF BREATH: 0
CHEST TIGHTNESS: 0
CONSTIPATION: 0
TROUBLE SWALLOWING: 0
RHINORRHEA: 0
ABDOMINAL PAIN: 0
WHEEZING: 0
EYE REDNESS: 0
BLOOD IN STOOL: 0
BACK PAIN: 0

## 2019-04-03 ASSESSMENT — PAIN SCALES - GENERAL: PAINLEVEL_OUTOF10: 7

## 2019-04-03 ASSESSMENT — PAIN DESCRIPTION - LOCATION: LOCATION: ARM;NECK

## 2019-04-03 ASSESSMENT — PAIN DESCRIPTION - ORIENTATION: ORIENTATION: LEFT

## 2019-04-03 NOTE — ED PROVIDER NOTES
[address] ED  eMERGENCY dEPARTMENT eNCOUnter      Pt Name: Doe Malagon  MRN: [id number]  [place] 1985  Date of evaluation: 4/3/19      CHIEF COMPLAINT       Chief Complaint   Patient presents with    Arm Pain    Neck Pain         HISTORY OF PRESENT ILLNESS    Doe Malagon is a 26 y.o. female who presents complaining of arm tingling. Patient states that since she woke up this morning she's been having pain in the left posterior aspect of her neck running down the upper portion of her arm with tingling sensation in that same area. Patient has had no loss of weakness. Patient states she still able to feel touching things. Patient denies headache blurry vision or weakness anywhere else. Patient has no nausea or vomiting. Patient does have a past history of having strokes and multiple surgeries and spinal meningitis in the past.  This all occurred when she was a baby. Patient states that she does not recall sleeping differently last night than normal.  Patient states that all the symptoms do get worse if she bends her neck forward. REVIEW OF SYSTEMS       Review of Systems   Constitutional: Negative for activity change, appetite change, chills, diaphoresis and fever. HENT: Negative for congestion, ear pain, facial swelling, nosebleeds, rhinorrhea, sinus pressure, sore throat and trouble swallowing. Eyes: Negative for pain, discharge and redness. Respiratory: Negative for cough, chest tightness, shortness of breath and wheezing. Cardiovascular: Negative for chest pain, palpitations and leg swelling. Gastrointestinal: Negative for abdominal pain, blood in stool, constipation, diarrhea, nausea and vomiting. Genitourinary: Negative for difficulty urinating, dysuria, flank pain, frequency, genital sores and hematuria. Musculoskeletal: Positive for neck pain. Negative for arthralgias, back pain, gait problem, joint swelling and myalgias.    Skin: Negative for color change, pallor, rash and wound. Neurological: Negative for dizziness, tremors, seizures, syncope, speech difficulty, weakness, numbness and headaches. Psychiatric/Behavioral: Negative for confusion, decreased concentration, hallucinations, self-injury, sleep disturbance and suicidal ideas. PAST MEDICAL HISTORY     Past Medical History:   Diagnosis Date    Headache     Meningitis spinal     Seizures ([address]     Stroke ([address]        SURGICAL HISTORY       Past Surgical History:   Procedure Laterality Date    BRAIN SURGERY      cyst and fluid build up    SHUNT REMOVAL         CURRENT MEDICATIONS       Previous Medications    AMITRIPTYLINE (ELAVIL) 25 MG TABLET    Start 25mg QHS after completing 10mg pills    CARBAMAZEPINE (CARBATROL) 200 MG EXTENDED RELEASE CAPSULE    TAKE 2 CAPSULES BY MOUTH 2 TIMES DAILY    MAGNESIUM OXIDE (MAG-OX) 400 (240 MG) MG TABLET    Take 1 tablet by mouth daily       ALLERGIES     is allergic to bactrim [sulfamethoxazole-trimethoprim]. SOCIAL HISTORY      reports that she has been smoking. She has been smoking about 0.25 packs per day. She has never used smokeless tobacco. She reports that she does not drink alcohol or use drugs. PHYSICAL EXAM     INITIAL VITALS: /85   Pulse 89   Temp 98.5 °F (36.9 °C) (Oral)   Resp 14   Ht 5' 2\" (1.575 m)   Wt 115 lb (52.2 kg)   LMP 04/01/2019   SpO2 99%   BMI 21.03 kg/m²      Physical Exam   Constitutional: She is oriented to person, place, and time. She appears well-developed and well-nourished. No distress. HENT:   Head: Normocephalic and atraumatic. Eyes: Pupils are equal, round, and reactive to light. Conjunctivae and EOM are normal. Right eye exhibits no discharge. Left eye exhibits no discharge. No scleral icterus. Cardiovascular: Normal rate, regular rhythm and normal heart sounds. Exam reveals no gallop and no friction rub. No murmur heard. Pulmonary/Chest: Effort normal and breath sounds normal. No respiratory distress. She has no wheezes. She has no rales. She exhibits no tenderness. Abdominal: Soft. Bowel sounds are normal. She exhibits no distension and no mass. There is no tenderness. There is no rebound and no guarding. Musculoskeletal: Normal range of motion. She exhibits no edema. Cervical back: She exhibits tenderness and pain. She exhibits normal range of motion, no bony tenderness, no swelling, no edema, no deformity, no laceration, no spasm and normal pulse. Neurological: She is alert and oriented to person, place, and time. She displays normal reflexes. No cranial nerve deficit or sensory deficit. She exhibits normal muscle tone. Coordination normal.   Skin: Skin is warm and dry. No rash noted. She is not diaphoretic. No erythema. No pallor. Psychiatric: She has a normal mood and affect. Her behavior is normal. Judgment and thought content normal.   Nursing note and vitals reviewed. DIAGNOSTIC RESULTS     RADIOLOGY:All plain film, CT,MRI, and formal ultrasound images (except ED bedside ultrasound) are read by the radiologist and the interpretations are directly viewed by the emergency physician. Ct Head Wo Contrast    Result Date: 4/3/2019  EXAMINATION: CT OF THE CERVICAL SPINE WITHOUT CONTRAST; CT OF THE HEAD WITHOUT CONTRAST 4/3/2019 8:26 pm TECHNIQUE: CT of the cervical spine was performed without the administration of intravenous contrast. Multiplanar reformatted images are provided for review. Dose modulation, iterative reconstruction, and/or weight based adjustment of the mA/kV was utilized to reduce the radiation dose to as low as reasonably achievable.; CT of the head was performed without the administration of intravenous contrast. Dose modulation, iterative reconstruction, and/or weight based adjustment of the mA/kV was utilized to reduce the radiation dose to as low as reasonably achievable.  COMPARISON: CT head 01/26/2019, 05/29/2009, 01/29/2009 HISTORY: ORDERING SYSTEM PROVIDED HISTORY: Neck pain, left arm numbness TECHNOLOGIST PROVIDED HISTORY: Ordering Physician Provided Reason for Exam: patient c/o left arm tingling and left sided neck pain today FINDINGS: HEAD BRAIN/VENTRICLES: Stable enlargement of the right lateral ventricular body, posterior and temporal horns with associated periventricular encephalomalacia. Internal septations within the right lateral ventricle versus cystic spaces are unchanged. Remainder of the ventricular system is within normal limits and also stable. Remaining gray-white differentiation is preserved and brain volume is otherwise normal.  No midline shift or herniation. No acute infarct or intracranial hemorrhage. Patent basal cisterns. No extra-axial fluid collection. ORBITS: The visualized portion of the orbits demonstrate no acute abnormality. SINUSES: The visualized paranasal sinuses and mastoid air cells demonstrate no acute abnormality. SOFT TISSUES/SKULL:  No acute abnormality of the visualized skull or soft tissues. Right frontal marilynn holes from prior ventriculoperitoneal shunt catheters. CERVICAL SPINE BONES: Vertebral body heights and alignment are maintained. Craniocervical junction and atlantodental articulations are in anatomic alignment. No fracture, compression deformity or subluxation. No jumped or locked facets. DEGENERATIVE: No significant degenerative changes. OTHER: Normal prevertebral soft tissues. No acute intracranial abnormality. Stable right lateral ventricular enlargement with internal septations/cystic spaces and periventricular encephalomalacia. No acute cervical spine abnormality. Ct Cervical Spine Wo Contrast    Result Date: 4/3/2019  EXAMINATION: CT OF THE CERVICAL SPINE WITHOUT CONTRAST; CT OF THE HEAD WITHOUT CONTRAST 4/3/2019 8:26 pm TECHNIQUE: CT of the cervical spine was performed without the administration of intravenous contrast. Multiplanar reformatted images are provided for review.  Dose modulation, iterative reconstruction, and/or weight based adjustment of the mA/kV was utilized to reduce the radiation dose to as low as reasonably achievable.; CT of the head was performed without the administration of intravenous contrast. Dose modulation, iterative reconstruction, and/or weight based adjustment of the mA/kV was utilized to reduce the radiation dose to as low as reasonably achievable. COMPARISON: CT head 01/26/2019, 05/29/2009, 01/29/2009 HISTORY: ORDERING SYSTEM PROVIDED HISTORY: Neck pain, left arm numbness TECHNOLOGIST PROVIDED HISTORY: Ordering Physician Provided Reason for Exam: patient c/o left arm tingling and left sided neck pain today FINDINGS: HEAD BRAIN/VENTRICLES: Stable enlargement of the right lateral ventricular body, posterior and temporal horns with associated periventricular encephalomalacia. Internal septations within the right lateral ventricle versus cystic spaces are unchanged. Remainder of the ventricular system is within normal limits and also stable. Remaining gray-white differentiation is preserved and brain volume is otherwise normal.  No midline shift or herniation. No acute infarct or intracranial hemorrhage. Patent basal cisterns. No extra-axial fluid collection. ORBITS: The visualized portion of the orbits demonstrate no acute abnormality. SINUSES: The visualized paranasal sinuses and mastoid air cells demonstrate no acute abnormality. SOFT TISSUES/SKULL:  No acute abnormality of the visualized skull or soft tissues. Right frontal marilynn holes from prior ventriculoperitoneal shunt catheters. CERVICAL SPINE BONES: Vertebral body heights and alignment are maintained. Craniocervical junction and atlantodental articulations are in anatomic alignment. No fracture, compression deformity or subluxation. No jumped or locked facets. DEGENERATIVE: No significant degenerative changes. OTHER: Normal prevertebral soft tissues. No acute intracranial abnormality.   Stable right lateral ventricular enlargement with internal septations/cystic spaces and periventricular encephalomalacia. No acute cervical spine abnormality. LABS: All lab results were reviewed by myself, and all abnormals are listed below. Labs Reviewed - No data to display      MEDICAL DECISION MAKING:     Patient's symptoms are not consistent with a stroke. Patient symptoms get worse with flexion of the neck. I believe the patient most likely has cervical radiculopathy related to either muscle spasms or potentially disc herniation that I don't think that's the case and she has nothing to have 4. We'll get CT scan of the head and neck to try to rule things out and if those look good we'll discharge with muscle relaxer and anti-inflammatory and told to follow PCP for possible MRI if this continues. EMERGENCY DEPARTMENT COURSE:   Vitals:    Vitals:    04/03/19 1954   BP: 120/85   Pulse: 89   Resp: 14   Temp: 98.5 °F (36.9 °C)   TempSrc: Oral   SpO2: 99%   Weight: 115 lb (52.2 kg)   Height: 5' 2\" (1.575 m)       The patient was given the following medications while in the emergency department:  Orders Placed This Encounter   Medications    orphenadrine (NORFLEX) injection 60 mg    cyclobenzaprine (FLEXERIL) 10 MG tablet     Sig: Take 1 tablet by mouth 3 times daily as needed for Muscle spasms     Dispense:  20 tablet     Refill:  0       -------------------------  8:55 PM  Patient and family were updated on the results. Patient will be discharged home with muscle relaxers and follow PCP for possible MRI. CONSULTS:  None    PROCEDURES:  None    FINAL IMPRESSION      1.  Cervical radiculopathy          DISPOSITION/PLAN   DISPOSITION Decision To Discharge 04/03/2019 08:54:45 PM      PATIENT REFERREDTO:  Silvia Chavez, APRN - CNP  [address] [address]  [phone]    In 1 week      [facility] ED  [address]    If symptoms worsen      DISCHARGEMEDICATIONS:  New Prescriptions    CYCLOBENZAPRINE (FLEXERIL) 10 MG TABLET    Take 1 tablet by mouth 3 times daily as needed for Muscle spasms       (Please note that portions of this note were completed with a voice recognition program.  Efforts were made to edit thedictations but occasionally words are mis-transcribed.)    Armond Patel MD  Attending Emergency Physician                         Armond Patel MD  04/03/19 [id number]

## 2019-04-23 ENCOUNTER — OFFICE VISIT (OUTPATIENT)
Dept: FAMILY MEDICINE CLINIC | Age: 34
End: 2019-04-23
Payer: COMMERCIAL

## 2019-04-23 VITALS
OXYGEN SATURATION: 99 % | WEIGHT: 126.6 LBS | SYSTOLIC BLOOD PRESSURE: 122 MMHG | HEART RATE: 117 BPM | BODY MASS INDEX: 23.16 KG/M2 | DIASTOLIC BLOOD PRESSURE: 76 MMHG

## 2019-04-23 DIAGNOSIS — M54.6 CHRONIC MIDLINE THORACIC BACK PAIN: ICD-10-CM

## 2019-04-23 DIAGNOSIS — G89.29 CHRONIC MIDLINE THORACIC BACK PAIN: ICD-10-CM

## 2019-04-23 DIAGNOSIS — M54.2 NECK PAIN: Primary | ICD-10-CM

## 2019-04-23 DIAGNOSIS — M79.602 LEFT ARM PAIN: ICD-10-CM

## 2019-04-23 DIAGNOSIS — G40.909 SEIZURE DISORDER (HCC): ICD-10-CM

## 2019-04-23 PROCEDURE — 99214 OFFICE O/P EST MOD 30 MIN: CPT | Performed by: NURSE PRACTITIONER

## 2019-04-23 RX ORDER — ALBUTEROL SULFATE 90 UG/1
2 AEROSOL, METERED RESPIRATORY (INHALATION) 4 TIMES DAILY PRN
Qty: 3 INHALER | Refills: 1 | Status: SHIPPED | OUTPATIENT
Start: 2019-04-23 | End: 2019-06-18 | Stop reason: SDUPTHER

## 2019-04-23 RX ORDER — PREDNISONE 20 MG/1
20 TABLET ORAL DAILY
Qty: 5 TABLET | Refills: 0 | Status: SHIPPED | OUTPATIENT
Start: 2019-04-23 | End: 2022-02-14

## 2019-04-23 RX ORDER — LANOLIN ALCOHOL/MO/W.PET/CERES
400 CREAM (GRAM) TOPICAL DAILY
Qty: 90 TABLET | Refills: 3 | Status: SHIPPED | OUTPATIENT
Start: 2019-04-23 | End: 2020-02-18

## 2019-04-23 RX ORDER — CYCLOBENZAPRINE HCL 5 MG
5 TABLET ORAL 3 TIMES DAILY PRN
COMMUNITY
End: 2019-08-28 | Stop reason: SDUPTHER

## 2019-04-23 RX ORDER — NICOTINE 21 MG/24HR
1 PATCH, TRANSDERMAL 24 HOURS TRANSDERMAL DAILY
Qty: 42 PATCH | Refills: 0 | Status: SHIPPED | OUTPATIENT
Start: 2019-04-23 | End: 2019-05-27 | Stop reason: SDUPTHER

## 2019-04-23 ASSESSMENT — ENCOUNTER SYMPTOMS
ALLERGIC/IMMUNOLOGIC NEGATIVE: 1
COUGH: 0
ABDOMINAL PAIN: 0
RESPIRATORY NEGATIVE: 1
GASTROINTESTINAL NEGATIVE: 1
CONSTIPATION: 0
DIARRHEA: 0
EYES NEGATIVE: 1

## 2019-04-23 ASSESSMENT — PATIENT HEALTH QUESTIONNAIRE - PHQ9
2. FEELING DOWN, DEPRESSED OR HOPELESS: 0
SUM OF ALL RESPONSES TO PHQ QUESTIONS 1-9: 0
SUM OF ALL RESPONSES TO PHQ9 QUESTIONS 1 & 2: 0
1. LITTLE INTEREST OR PLEASURE IN DOING THINGS: 0
SUM OF ALL RESPONSES TO PHQ QUESTIONS 1-9: 0

## 2019-04-23 NOTE — PROGRESS NOTES
[address] [facility] [address]  Dept: [phone]  Dept Fax: [phone]      Aurea Buck is a 26 y.o. female who presents today for hermedical conditions/complaints as noted below. Aurae Buck is c/o of Shoulder Pain; Back Pain; and Dizziness            HPI:      AGA Her is here today with left shoulder and back pain. She was having pain and tingling in her right arm 2 weeks ago and went to [facility] where they did a CT scan. She was dropping objects that she would try to grasp. The pain will start in the back on her neck, down her left arm and in the middle of her back. She describes it as a stabbing pain and it wakes her up at night. She has not tried taking an anti-inflammatory. She has taken flexeril that the ED prescribed her but she had little relief. She also complains of dizziness. They did do full workup to r/o CVA    No seizure activity    Smoking - continues to smoke a half pack per day. Interested in quitting. Realizes this is not good for her health.        No results found for: LABA1C          ( goal A1Cis < 7)   No results found for: LABMICR  LDL Cholesterol (mg/dL)   Date Value   06/25/2018 131 (H)       (goal LDL is <100)   AST (U/L)   Date Value   01/25/2019 22     ALT (U/L)   Date Value   01/25/2019 21     BUN (mg/dL)   Date Value   01/25/2019 13     BP Readings from Last 3 Encounters:   04/23/19 122/76   04/03/19 120/85   01/25/19 (!) 145/98          (goal 120/80)    Past Medical History:   Diagnosis Date    Headache     Meningitis spinal     Seizures (HCC)     Stroke [facility])       Past Surgical History:   Procedure Laterality Date    BRAIN SURGERY      cyst and fluid build up    SHUNT REMOVAL         Family History   Problem Relation Age of Onset    Migraines Mother     Heart Disease Mother     Heart Attack Father     Heart Disease Father     High Blood Pressure Maternal Grandmother     Cancer Maternal Grandfather           Social History     Tobacco Use    Smoking status: Light Tobacco Smoker     Packs/day: 0.25    Smokeless tobacco: Never Used   Substance Use Topics    Alcohol use: No         Current Outpatient Medications   Medication Sig Dispense Refill    cyclobenzaprine (FLEXERIL) 5 MG tablet Take 5 mg by mouth 3 times daily as needed for Muscle spasms      magnesium oxide (MAG-OX) 400 (240 Mg) MG tablet Take 1 tablet by mouth daily 90 tablet 3    predniSONE (DELTASONE) 20 MG tablet Take 1 tablet by mouth daily for 5 days 5 tablet 0    nicotine (NICODERM CQ) 14 MG/24HR Place 1 patch onto the skin daily 42 patch 0    albuterol sulfate  (90 Base) MCG/ACT inhaler Inhale 2 puffs into the lungs 4 times daily as needed for Wheezing 3 Inhaler 1    amitriptyline (ELAVIL) 25 MG tablet Start 25mg QHS after completing 10mg pills 30 tablet 0    carBAMazepine (CARBATROL) 200 MG extended release capsule TAKE 2 CAPSULES BY MOUTH 2 TIMES DAILY 120 capsule 0     No current facility-administered medications for this visit. Allergies   Allergen Reactions    Bactrim [Sulfamethoxazole-Trimethoprim] Hives     hives       Health Maintenance   Topic Date Due    Pneumococcal 0-64 years Vaccine (1 of 1 - PPSV23) 12/02/1989    Varicella Vaccine (1 of 2 - 13+ 2-dose series) 12/02/1996    Cervical cancer screen  06/19/2019 (Originally 12/2/2004)    Flu vaccine (Season Ended) 09/01/2019    DTaP/Tdap/Td vaccine (2 - Td) 04/05/2022    HIV screen  Completed          Review of Systems   Constitutional: Negative for appetite change. HENT: Negative for ear pain. Eyes: Negative. Respiratory: Negative. Negative for cough. Cardiovascular: Negative. Negative for chest pain. Gastrointestinal: Negative. Negative for abdominal pain, constipation and diarrhea. Endocrine: Negative. Negative for cold intolerance and heat intolerance. Genitourinary: Negative.     Musculoskeletal: Positive for gait problem (walks with limp, has braces ). Negative for arthralgias. Skin: Negative. Negative for pallor and rash. Allergic/Immunologic: Negative. Neurological: Positive for seizures and headaches. Negative for numbness. Hematological: Negative. Psychiatric/Behavioral: Negative. Negative for sleep disturbance. The patient is not nervous/anxious. Objective:     /76 (Site: Right Upper Arm, Position: Sitting, Cuff Size: Medium Adult)   Pulse 117   Wt 126 lb 9.6 oz (57.4 kg)   LMP 04/01/2019   SpO2 99%   BMI 23.16 kg/m²   Physical Exam   Constitutional: She is oriented to person, place, and time. She appears well-developed and well-nourished. HENT:   Head: Normocephalic. Eyes: Conjunctivae are normal.   Cardiovascular: Normal rate and regular rhythm. Pulmonary/Chest: Effort normal and breath sounds normal.   Musculoskeletal:        Cervical back: She exhibits decreased range of motion and pain. Back:    Neurological: She is alert and oriented to person, place, and time. She exhibits abnormal muscle tone (left sided). Coordination abnormal.   Skin: Skin is warm and dry. Psychiatric: She has a normal mood and affect. Her behavior is normal. Judgment and thought content normal.         Assessment:      1. Neck pain    2. Left arm pain    3. Chronic midline thoracic back pain    4. Seizure disorder [facility])          Quality & Risk Score Accuracy    Last edited 04/23/19 17:29 EDT by MIKE Gutierrez - GEMINI           Plan:      Orders Placed This Encounter   Procedures   [facility]     Referral Priority:   Routine     Referral Type:   Eval and Treat     Referral Reason:   Specialty Services Required     Requested Specialty:   Physical Therapy     Number of Visits Requested:   1     Recommend that she use ibu and acetamin for her discomfort. Has flexeril  rto after PT to re eval    Seizure - stable  No follow-ups on file.               Patient given educational materials - see patientinstructions. Discussed use, benefit, and side effects of prescribed medications. All patient questions answered. Pt voiced understanding. Reviewed health maintenance. Instructed to continue current medications, diet and exercise. Patient agreedwith treatment plan. Follow up as directed.      Electronically signed by MIKE Wang CNP on 4/23/2019

## 2019-04-23 NOTE — PROGRESS NOTES
Pt c/o stabbing pain in back below shoulder blade towards middle of back, left shoulder pain, dizzy spells     Visit Information    Have you changed or started any medications since your last visit including any over-the-counter medicines, vitamins, or herbal medicines? no   Have you stopped taking any of your medications? Is so, why? -  no  Are you having any side effects from any of your medications? - no    Have you seen any other physician or provider since your last visit? yes - ER    Have you had any other diagnostic tests since your last visit? yes - [facility]   Have you been seen in the emergency room and/or had an admission in a hospital since we last saw you?  yes -  [facility]   Have you had your routine dental cleaning in the past 6 months?  no     Do you have an active MyChart account? If no, what is the barrier?   Yes    Patient Care Team:  MIKE Benz CNP as PCP - General  MIKE Benz CNP as PCP - S Attributed Provider  Jose Mahmood MD as Consulting Physician (Neurology)    Medical History Review  Past Medical, Family, and Social History reviewed and does not contribute to the patient presenting condition    Health Maintenance   Topic Date Due    Pneumococcal 0-64 years Vaccine (1 of 1 - PPSV23) 12/02/1989    Varicella Vaccine (1 of 2 - 13+ 2-dose series) 12/02/1996    Cervical cancer screen  06/19/2019 (Originally 12/2/2004)    Flu vaccine (Season Ended) 09/01/2019    DTaP/Tdap/Td vaccine (2 - Td) 04/05/2022    HIV screen  Completed

## 2019-04-29 RX ORDER — CARBAMAZEPINE 200 MG/1
CAPSULE, EXTENDED RELEASE ORAL
Qty: 120 CAPSULE | Refills: 1 | Status: SHIPPED | OUTPATIENT
Start: 2019-04-29 | End: 2019-07-09 | Stop reason: SDUPTHER

## 2019-04-29 RX ORDER — SUMATRIPTAN 20 MG/1
SPRAY NASAL
Qty: 1 INHALER | Refills: 5 | OUTPATIENT
Start: 2019-04-29

## 2019-05-07 RX ORDER — NICOTINE 21 MG/24HR
1 PATCH, TRANSDERMAL 24 HOURS TRANSDERMAL DAILY
Qty: 42 PATCH | Refills: 0 | OUTPATIENT
Start: 2019-05-07 | End: 2019-06-18

## 2019-05-28 RX ORDER — NICOTINE 21 MG/24HR
1 PATCH, TRANSDERMAL 24 HOURS TRANSDERMAL DAILY
Qty: 42 PATCH | Refills: 0 | Status: SHIPPED | OUTPATIENT
Start: 2019-05-28 | End: 2019-07-08 | Stop reason: SDUPTHER

## 2019-05-28 NOTE — TELEPHONE ENCOUNTER
Last visit: 4/23/19  Last Med refill: 4/23/19  Does patient have enough medication for 72 hours: Yes    Next Visit Date:  Future Appointments   Date Time Provider Mary Jo Sewell   6/13/2019 11:10 AM Laurelyn Dupes, APRN - CNP [facility] [address] Maintenance   Topic Date Due    Pneumococcal 0-64 years Vaccine (1 of 1 - PPSV23) 12/02/1989    Varicella Vaccine (1 of 2 - 13+ 2-dose series) 12/02/1996    Cervical cancer screen  06/19/2019 (Originally 12/2/2004)    Flu vaccine (Season Ended) 09/01/2019    DTaP/Tdap/Td vaccine (2 - Td) 04/05/2022    HIV screen  Completed       No results found for: LABA1C          ( goal A1C is < 7)   No results found for: LABMICR  LDL Cholesterol (mg/dL)   Date Value   06/25/2018 131 (H)       (goal LDL is <100)   AST (U/L)   Date Value   01/25/2019 22     ALT (U/L)   Date Value   01/25/2019 21     BUN (mg/dL)   Date Value   01/25/2019 13     BP Readings from Last 3 Encounters:   04/23/19 122/76   04/03/19 120/85   01/25/19 (!) 145/98          (goal 120/80)    All Future Testing planned in [facility]              Patient Active Problem List:     Neurologic gait disorder     Cerebrovascular accident (CVA) ([facility] [address]     Seizure disorder ([facility] [address]     Migraine without aura and without status migrainosus, not intractable     Foot drop, left foot     History of brain shunt     Cerebral ventriculomegaly     Encephalomalacia on imaging study     H/O: stroke

## 2019-05-29 RX ORDER — CARBAMAZEPINE 200 MG/1
CAPSULE, EXTENDED RELEASE ORAL
Qty: 120 CAPSULE | Refills: 1 | OUTPATIENT
Start: 2019-05-29

## 2019-06-18 NOTE — TELEPHONE ENCOUNTER
Last visit: 4/23/19  Last Med refill: 4/23/19  Does patient have enough medication for 72 hours: Yes    Next Visit Date:  No future appointments.     Health Maintenance   Topic Date Due    Pneumococcal 0-64 years Vaccine (1 of 1 - PPSV23) 12/02/1989    Varicella Vaccine (1 of 2 - 13+ 2-dose series) 12/02/1996    Cervical cancer screen  06/19/2019 (Originally 12/2/2004)    Flu vaccine (Season Ended) 09/01/2019    DTaP/Tdap/Td vaccine (2 - Td) 04/05/2022    HIV screen  Completed       No results found for: LABA1C          ( goal A1C is < 7)   No results found for: LABMICR  LDL Cholesterol (mg/dL)   Date Value   06/25/2018 131 (H)       (goal LDL is <100)   AST (U/L)   Date Value   01/25/2019 22     ALT (U/L)   Date Value   01/25/2019 21     BUN (mg/dL)   Date Value   01/25/2019 13     BP Readings from Last 3 Encounters:   04/23/19 122/76   04/03/19 120/85   01/25/19 (!) 145/98          (goal 120/80)    All Future Testing planned in [facility]              Patient Active Problem List:     Neurologic gait disorder     Cerebrovascular accident (CVA) ([address]     Seizure disorder ([address]     Migraine without aura and without status migrainosus, not intractable     Foot drop, left foot     History of brain shunt     Cerebral ventriculomegaly     Encephalomalacia on imaging study     H/O: stroke

## 2019-06-19 RX ORDER — ALBUTEROL SULFATE 90 UG/1
2 AEROSOL, METERED RESPIRATORY (INHALATION) 4 TIMES DAILY PRN
Qty: 3 INHALER | Refills: 1 | Status: SHIPPED | OUTPATIENT
Start: 2019-06-19 | End: 2019-12-17 | Stop reason: SDUPTHER

## 2019-07-08 NOTE — TELEPHONE ENCOUNTER
Last visit: 4/23/19  Last Med refill: 5/28/19  Does patient have enough medication for 72 hours: Yes    Next Visit Date:  No future appointments.     Health Maintenance   Topic Date Due    Pneumococcal 0-64 years Vaccine (1 of 1 - PPSV23) 12/02/1989    Varicella Vaccine (1 of 2 - 13+ 2-dose series) 12/02/1996    Cervical cancer screen  12/02/2004    Flu vaccine (1) 09/01/2019    DTaP/Tdap/Td vaccine (2 - Td) 04/05/2022    HIV screen  Completed       No results found for: LABA1C          ( goal A1C is < 7)   No results found for: LABMICR  LDL Cholesterol (mg/dL)   Date Value   06/25/2018 131 (H)       (goal LDL is <100)   AST (U/L)   Date Value   01/25/2019 22     ALT (U/L)   Date Value   01/25/2019 21     BUN (mg/dL)   Date Value   01/25/2019 13     BP Readings from Last 3 Encounters:   04/23/19 122/76   04/03/19 120/85   01/25/19 (!) 145/98          (goal 120/80)    All Future Testing planned in [facility]              Patient Active Problem List:     Neurologic gait disorder     Cerebrovascular accident (CVA) ([facility] [address]     Seizure disorder ([address]     Migraine without aura and without status migrainosus, not intractable     Foot drop, left foot     History of brain shunt     Cerebral ventriculomegaly     Encephalomalacia on imaging study     H/O: stroke

## 2019-07-09 RX ORDER — NICOTINE 21 MG/24HR
1 PATCH, TRANSDERMAL 24 HOURS TRANSDERMAL DAILY
Qty: 42 PATCH | Refills: 0 | Status: SHIPPED | OUTPATIENT
Start: 2019-07-09 | End: 2019-08-02 | Stop reason: SDUPTHER

## 2019-07-09 RX ORDER — CARBAMAZEPINE 200 MG/1
CAPSULE, EXTENDED RELEASE ORAL
Qty: 120 CAPSULE | Refills: 1 | Status: SHIPPED | OUTPATIENT
Start: 2019-07-09 | End: 2019-08-26

## 2019-08-02 RX ORDER — NICOTINE 21 MG/24HR
1 PATCH, TRANSDERMAL 24 HOURS TRANSDERMAL DAILY
Qty: 42 PATCH | Refills: 0 | Status: SHIPPED | OUTPATIENT
Start: 2019-08-02 | End: 2021-03-30

## 2019-08-02 RX ORDER — CARBAMAZEPINE 200 MG/1
TABLET, EXTENDED RELEASE ORAL
Qty: 120 TABLET | OUTPATIENT
Start: 2019-08-02

## 2019-08-02 RX ORDER — SUMATRIPTAN 20 MG/1
SPRAY NASAL
Qty: 6 EACH | OUTPATIENT
Start: 2019-08-02

## 2019-08-26 RX ORDER — CARBAMAZEPINE 200 MG/1
CAPSULE, EXTENDED RELEASE ORAL
Qty: 120 CAPSULE | Refills: 1 | Status: SHIPPED | OUTPATIENT
Start: 2019-08-26 | End: 2019-10-28 | Stop reason: SDUPTHER

## 2019-08-26 RX ORDER — CARBAMAZEPINE 200 MG/1
TABLET, EXTENDED RELEASE ORAL
Qty: 120 TABLET | OUTPATIENT
Start: 2019-08-26

## 2019-08-26 RX ORDER — SUMATRIPTAN 20 MG/1
SPRAY NASAL
Qty: 6 EACH | OUTPATIENT
Start: 2019-08-26

## 2019-08-28 ENCOUNTER — OFFICE VISIT (OUTPATIENT)
Dept: FAMILY MEDICINE CLINIC | Age: 34
End: 2019-08-28
Payer: COMMERCIAL

## 2019-08-28 VITALS
BODY MASS INDEX: 21.77 KG/M2 | OXYGEN SATURATION: 98 % | WEIGHT: 119 LBS | DIASTOLIC BLOOD PRESSURE: 70 MMHG | SYSTOLIC BLOOD PRESSURE: 122 MMHG | HEART RATE: 79 BPM

## 2019-08-28 DIAGNOSIS — G40.909 SEIZURE DISORDER (HCC): ICD-10-CM

## 2019-08-28 DIAGNOSIS — M79.602 LEFT ARM PAIN: ICD-10-CM

## 2019-08-28 DIAGNOSIS — M54.2 NECK PAIN: ICD-10-CM

## 2019-08-28 DIAGNOSIS — R20.2 NUMBNESS AND TINGLING IN LEFT ARM: Primary | ICD-10-CM

## 2019-08-28 DIAGNOSIS — R20.0 NUMBNESS AND TINGLING IN LEFT ARM: Primary | ICD-10-CM

## 2019-08-28 DIAGNOSIS — K59.04 CHRONIC IDIOPATHIC CONSTIPATION: ICD-10-CM

## 2019-08-28 DIAGNOSIS — G43.009 MIGRAINE WITHOUT AURA AND WITHOUT STATUS MIGRAINOSUS, NOT INTRACTABLE: ICD-10-CM

## 2019-08-28 PROCEDURE — 99214 OFFICE O/P EST MOD 30 MIN: CPT | Performed by: NURSE PRACTITIONER

## 2019-08-28 RX ORDER — ALUMINUM ZIRCONIUM OCTACHLOROHYDREX GLY 16 G/100G
1 GEL TOPICAL DAILY
Qty: 90 CAPSULE | Refills: 3 | Status: SHIPPED | OUTPATIENT
Start: 2019-08-28 | End: 2020-10-20

## 2019-08-28 RX ORDER — AMITRIPTYLINE HYDROCHLORIDE 50 MG/1
50 TABLET, FILM COATED ORAL NIGHTLY
Qty: 90 TABLET | Refills: 1 | Status: SHIPPED | OUTPATIENT
Start: 2019-08-28 | End: 2020-01-15

## 2019-08-28 RX ORDER — POLYETHYLENE GLYCOL 3350 17 G/17G
17 POWDER, FOR SOLUTION ORAL DAILY
Qty: 1530 G | Refills: 11 | Status: SHIPPED | OUTPATIENT
Start: 2019-08-28 | End: 2022-02-14

## 2019-08-28 RX ORDER — CYCLOBENZAPRINE HCL 5 MG
5 TABLET ORAL 2 TIMES DAILY PRN
Qty: 60 TABLET | Refills: 2 | Status: SHIPPED | OUTPATIENT
Start: 2019-08-28 | End: 2021-09-14

## 2019-08-28 ASSESSMENT — ENCOUNTER SYMPTOMS
CONSTIPATION: 1
EYES NEGATIVE: 1
ALLERGIC/IMMUNOLOGIC NEGATIVE: 1
RESPIRATORY NEGATIVE: 1
ABDOMINAL PAIN: 1
COUGH: 0

## 2019-10-21 DIAGNOSIS — M79.602 LEFT ARM PAIN: ICD-10-CM

## 2019-10-21 DIAGNOSIS — M54.2 NECK PAIN: ICD-10-CM

## 2019-10-21 DIAGNOSIS — G43.009 MIGRAINE WITHOUT AURA AND WITHOUT STATUS MIGRAINOSUS, NOT INTRACTABLE: ICD-10-CM

## 2019-10-21 RX ORDER — CYCLOBENZAPRINE HCL 5 MG
5 TABLET ORAL 2 TIMES DAILY PRN
Qty: 60 TABLET | Refills: 2 | OUTPATIENT
Start: 2019-10-21

## 2019-10-21 RX ORDER — ALBUTEROL SULFATE 90 UG/1
2 AEROSOL, METERED RESPIRATORY (INHALATION) 4 TIMES DAILY PRN
Qty: 54 G | OUTPATIENT
Start: 2019-10-21

## 2019-10-21 RX ORDER — CARBAMAZEPINE 200 MG/1
TABLET, EXTENDED RELEASE ORAL
Qty: 240 TABLET | OUTPATIENT
Start: 2019-10-21

## 2019-10-21 RX ORDER — AMITRIPTYLINE HYDROCHLORIDE 50 MG/1
50 TABLET, FILM COATED ORAL NIGHTLY
Qty: 90 TABLET | Refills: 1 | OUTPATIENT
Start: 2019-10-21

## 2019-11-01 ENCOUNTER — HOSPITAL ENCOUNTER (EMERGENCY)
Age: 34
Discharge: HOME | End: 2019-11-01
Attending: EMERGENCY MEDICINE
Payer: COMMERCIAL

## 2019-11-01 ENCOUNTER — APPOINTMENT (OUTPATIENT)
Dept: CT IMAGING | Age: 34
End: 2019-11-01
Payer: COMMERCIAL

## 2019-11-01 VITALS
SYSTOLIC BLOOD PRESSURE: 142 MMHG | DIASTOLIC BLOOD PRESSURE: 107 MMHG | OXYGEN SATURATION: 100 % | TEMPERATURE: 98.1 F | WEIGHT: 120 LBS | RESPIRATION RATE: 14 BRPM | BODY MASS INDEX: 22.08 KG/M2 | HEART RATE: 110 BPM | HEIGHT: 62 IN

## 2019-11-01 DIAGNOSIS — G43.909 MIGRAINE WITHOUT STATUS MIGRAINOSUS, NOT INTRACTABLE, UNSPECIFIED MIGRAINE TYPE: ICD-10-CM

## 2019-11-01 DIAGNOSIS — N64.4 BREAST PAIN, RIGHT: Primary | ICD-10-CM

## 2019-11-01 LAB
-: NORMAL
CHP ED QC CHECK: NORMAL
HCG, PREGNANCY URINE (POC): NEGATIVE
PREGNANCY TEST URINE, POC: NEGATIVE

## 2019-11-01 PROCEDURE — 6370000000 HC RX 637 (ALT 250 FOR IP): Performed by: EMERGENCY MEDICINE

## 2019-11-01 PROCEDURE — 96374 THER/PROPH/DIAG INJ IV PUSH: CPT

## 2019-11-01 PROCEDURE — 6360000002 HC RX W HCPCS: Performed by: EMERGENCY MEDICINE

## 2019-11-01 PROCEDURE — 70450 CT HEAD/BRAIN W/O DYE: CPT

## 2019-11-01 PROCEDURE — 2580000003 HC RX 258: Performed by: EMERGENCY MEDICINE

## 2019-11-01 PROCEDURE — 81025 URINE PREGNANCY TEST: CPT

## 2019-11-01 PROCEDURE — 96375 TX/PRO/DX INJ NEW DRUG ADDON: CPT

## 2019-11-01 PROCEDURE — 99284 EMERGENCY DEPT VISIT MOD MDM: CPT

## 2019-11-01 RX ORDER — ACETAMINOPHEN 325 MG/1
650 TABLET ORAL ONCE
Status: COMPLETED | OUTPATIENT
Start: 2019-11-01 | End: 2019-11-01

## 2019-11-01 RX ORDER — 0.9 % SODIUM CHLORIDE 0.9 %
1000 INTRAVENOUS SOLUTION INTRAVENOUS ONCE
Status: COMPLETED | OUTPATIENT
Start: 2019-11-01 | End: 2019-11-01

## 2019-11-01 RX ORDER — DEXAMETHASONE SODIUM PHOSPHATE 10 MG/ML
10 INJECTION, SOLUTION INTRAMUSCULAR; INTRAVENOUS ONCE
Status: COMPLETED | OUTPATIENT
Start: 2019-11-01 | End: 2019-11-01

## 2019-11-01 RX ORDER — DIPHENHYDRAMINE HYDROCHLORIDE 50 MG/ML
25 INJECTION INTRAMUSCULAR; INTRAVENOUS EVERY 6 HOURS PRN
Status: DISCONTINUED | OUTPATIENT
Start: 2019-11-01 | End: 2019-11-01 | Stop reason: HOSPADM

## 2019-11-01 RX ORDER — PROMETHAZINE HYDROCHLORIDE 25 MG/1
25 TABLET ORAL ONCE
Status: COMPLETED | OUTPATIENT
Start: 2019-11-01 | End: 2019-11-01

## 2019-11-01 RX ORDER — METOCLOPRAMIDE HYDROCHLORIDE 5 MG/ML
10 INJECTION INTRAMUSCULAR; INTRAVENOUS ONCE
Status: COMPLETED | OUTPATIENT
Start: 2019-11-01 | End: 2019-11-01

## 2019-11-01 RX ADMIN — PROMETHAZINE HYDROCHLORIDE 25 MG: 25 TABLET ORAL at 16:24

## 2019-11-01 RX ADMIN — DEXAMETHASONE SODIUM PHOSPHATE 10 MG: 10 INJECTION, SOLUTION INTRAMUSCULAR; INTRAVENOUS at 17:41

## 2019-11-01 RX ADMIN — METOCLOPRAMIDE 10 MG: 5 INJECTION, SOLUTION INTRAMUSCULAR; INTRAVENOUS at 17:38

## 2019-11-01 RX ADMIN — ACETAMINOPHEN 650 MG: 325 TABLET, FILM COATED ORAL at 16:24

## 2019-11-01 RX ADMIN — SODIUM CHLORIDE 1000 ML: 9 INJECTION, SOLUTION INTRAVENOUS at 17:37

## 2019-11-01 RX ADMIN — DIPHENHYDRAMINE HYDROCHLORIDE 25 MG: 50 INJECTION, SOLUTION INTRAMUSCULAR; INTRAVENOUS at 17:43

## 2019-11-01 ASSESSMENT — ENCOUNTER SYMPTOMS
COLOR CHANGE: 0
COUGH: 0
ABDOMINAL PAIN: 0
PHOTOPHOBIA: 1
SORE THROAT: 0
NAUSEA: 1
EYE REDNESS: 0
SHORTNESS OF BREATH: 0
BACK PAIN: 0
RHINORRHEA: 0

## 2019-11-01 ASSESSMENT — PAIN SCALES - GENERAL: PAINLEVEL_OUTOF10: 6

## 2019-11-04 ENCOUNTER — TELEPHONE (OUTPATIENT)
Dept: FAMILY MEDICINE CLINIC | Age: 34
End: 2019-11-04

## 2019-11-04 DIAGNOSIS — N64.4 PAIN OF RIGHT BREAST: Primary | ICD-10-CM

## 2019-11-05 ENCOUNTER — TELEPHONE (OUTPATIENT)
Dept: FAMILY MEDICINE CLINIC | Age: 34
End: 2019-11-05

## 2019-11-22 ENCOUNTER — HOSPITAL ENCOUNTER (OUTPATIENT)
Dept: WOMENS IMAGING | Age: 34
Discharge: HOME | End: 2019-11-24
Payer: COMMERCIAL

## 2019-11-22 DIAGNOSIS — R52 PAIN: ICD-10-CM

## 2019-11-22 DIAGNOSIS — N64.4 PAIN OF RIGHT BREAST: ICD-10-CM

## 2019-11-22 PROCEDURE — 76642 ULTRASOUND BREAST LIMITED: CPT

## 2019-11-22 PROCEDURE — G0279 TOMOSYNTHESIS, MAMMO: HCPCS

## 2019-12-17 RX ORDER — ALBUTEROL SULFATE 90 UG/1
2 AEROSOL, METERED RESPIRATORY (INHALATION) 4 TIMES DAILY PRN
Qty: 54 G | Refills: 5 | Status: SHIPPED | OUTPATIENT
Start: 2019-12-17 | End: 2020-11-19 | Stop reason: SDUPTHER

## 2020-01-15 RX ORDER — AMITRIPTYLINE HYDROCHLORIDE 50 MG/1
50 TABLET, FILM COATED ORAL NIGHTLY
Qty: 90 TABLET | Refills: 1 | Status: SHIPPED | OUTPATIENT
Start: 2020-01-15 | End: 2020-02-18

## 2020-01-15 NOTE — TELEPHONE ENCOUNTER
Last visit: 8/28/19  Last Med refill: 8/28/19  Does patient have enough medication for 72 hours: Yes    Next Visit Date:  No future appointments.     Health Maintenance   Topic Date Due    Varicella Vaccine (1 of 2 - 2-dose childhood series) 12/02/1984    Cervical cancer screen  12/02/2004    Annual Wellness Visit (AWV)  05/29/2019    Flu vaccine (1) 09/01/2019    Pneumococcal 0-64 years Vaccine (1 of 1 - PPSV23) 08/28/2020 (Originally 12/2/1989)    DTaP/Tdap/Td vaccine (2 - Td) 04/05/2022    HIV screen  Completed       No results found for: LABA1C          ( goal A1C is < 7)   No results found for: LABMICR  LDL Cholesterol (mg/dL)   Date Value   06/25/2018 131 (H)       (goal LDL is <100)   AST (U/L)   Date Value   01/25/2019 22     ALT (U/L)   Date Value   01/25/2019 21     BUN (mg/dL)   Date Value   01/25/2019 13     BP Readings from Last 3 Encounters:   11/01/19 (!) 142/107   08/28/19 122/70   04/23/19 122/76          (goal 120/80)    All Future Testing planned in [facility]  Lab Frequency Next Occurrence   EMG Once 01/11/2020   Carbamazepine Level, Total Once 01/11/2020               Patient Active Problem List:     Neurologic gait disorder     Cerebrovascular accident (CVA) ([facility]     Seizure disorder ([facility] [address]     Migraine without aura and without status migrainosus, not intractable     Foot drop, left foot     History of brain shunt     Cerebral ventriculomegaly     Encephalomalacia on imaging study     H/O: stroke     Chronic idiopathic constipation     Numbness and tingling in left arm     Neck pain

## 2020-02-17 NOTE — TELEPHONE ENCOUNTER
Last visit: 8/28/19  Last Med refill: 4/23/19  Does patient have enough medication for 72 hours: Yes    Next Visit Date:need appt  No future appointments.     Health Maintenance   Topic Date Due    Varicella vaccine (1 of 2 - 2-dose childhood series) 12/02/1984    Cervical cancer screen  12/02/2004    Annual Wellness Visit (AWV)  05/29/2019    Flu vaccine (1) 09/01/2019    Pneumococcal 0-64 years Vaccine (1 of 1 - PPSV23) 08/28/2020 (Originally 12/2/1989)    DTaP/Tdap/Td vaccine (2 - Td) 04/05/2022    Shingles Vaccine (1 of 2) 12/02/2033    HIV screen  Completed    Hepatitis A vaccine  Aged Out    Hepatitis B vaccine  Aged Out    Hib vaccine  Aged Out    Meningococcal (ACWY) vaccine  Aged Out       No results found for: LABA1C          ( goal A1C is < 7)   No results found for: LABMICR  LDL Cholesterol (mg/dL)   Date Value   06/25/2018 131 (H)       (goal LDL is <100)   AST (U/L)   Date Value   01/25/2019 22     ALT (U/L)   Date Value   01/25/2019 21     BUN (mg/dL)   Date Value   01/25/2019 13     BP Readings from Last 3 Encounters:   11/01/19 (!) 142/107   08/28/19 122/70   04/23/19 122/76          (goal 120/80)    All Future Testing planned in [facility]  Lab Frequency Next Occurrence   EMG Once 02/16/2020   Carbamazepine Level, Total Once 01/11/2020               Patient Active Problem List:     Neurologic gait disorder     Cerebrovascular accident (CVA) ([facility] [address]     Seizure disorder ([facility] [address]     Migraine without aura and without status migrainosus, not intractable     Foot drop, left foot     History of brain shunt     Cerebral ventriculomegaly     Encephalomalacia on imaging study     H/O: stroke     Chronic idiopathic constipation     Numbness and tingling in left arm     Neck pain

## 2020-02-18 RX ORDER — MAGNESIUM OXIDE 400 MG/1
TABLET ORAL
Qty: 90 TABLET | Refills: 1 | Status: SHIPPED | OUTPATIENT
Start: 2020-02-18 | End: 2021-02-23 | Stop reason: DRUGHIGH

## 2020-02-18 RX ORDER — AMITRIPTYLINE HYDROCHLORIDE 50 MG/1
50 TABLET, FILM COATED ORAL NIGHTLY
Qty: 90 TABLET | Refills: 1 | Status: SHIPPED | OUTPATIENT
Start: 2020-02-18 | End: 2020-05-19

## 2020-04-13 RX ORDER — LANOLIN ALCOHOL/MO/W.PET/CERES
CREAM (GRAM) TOPICAL
Qty: 90 TABLET | OUTPATIENT
Start: 2020-04-13

## 2020-04-23 RX ORDER — CARBAMAZEPINE 200 MG/1
TABLET, EXTENDED RELEASE ORAL
Qty: 120 TABLET | Refills: 0 | Status: SHIPPED | OUTPATIENT
Start: 2020-04-23 | End: 2020-06-02

## 2020-04-23 NOTE — TELEPHONE ENCOUNTER
Last visit: 8/28/19  Last Med refill: 10/29/19  Does patient have enough medication for 72 hours: Yes    PATIENT NEEDS APPT    Next Visit Date:  No future appointments.     Health Maintenance   Topic Date Due    Varicella vaccine (1 of 2 - 2-dose childhood series) 12/02/1984    Cervical cancer screen  12/02/2004    Annual Wellness Visit (AWV)  05/29/2019    Pneumococcal 0-64 years Vaccine (1 of 1 - PPSV23) 08/28/2020 (Originally 12/2/1989)    Flu vaccine (Season Ended) 09/01/2020    DTaP/Tdap/Td vaccine (2 - Td) 04/05/2022    HIV screen  Completed    Hepatitis A vaccine  Aged Out    Hepatitis B vaccine  Aged Out    Hib vaccine  Aged Out    Meningococcal (ACWY) vaccine  Aged Out       No results found for: LABA1C          ( goal A1C is < 7)   No results found for: LABMICR  LDL Cholesterol (mg/dL)   Date Value   06/25/2018 131 (H)       (goal LDL is <100)   AST (U/L)   Date Value   01/25/2019 22     ALT (U/L)   Date Value   01/25/2019 21     BUN (mg/dL)   Date Value   01/25/2019 13     BP Readings from Last 3 Encounters:   11/01/19 (!) 142/107   08/28/19 122/70   04/23/19 122/76          (goal 120/80)    All Future Testing planned in [facility]              Patient Active Problem List:     Neurologic gait disorder     Cerebrovascular accident (CVA) ([facility] [address]     Seizure disorder ([facility] [address]     Migraine without aura and without status migrainosus, not intractable     Foot drop, left foot     History of brain shunt     Cerebral ventriculomegaly     Encephalomalacia on imaging study     H/O: stroke     Chronic idiopathic constipation     Numbness and tingling in left arm     Neck pain

## 2020-05-19 ENCOUNTER — OFFICE VISIT (OUTPATIENT)
Dept: NEUROLOGY | Age: 35
End: 2020-05-19
Payer: COMMERCIAL

## 2020-05-19 VITALS
SYSTOLIC BLOOD PRESSURE: 124 MMHG | WEIGHT: 119 LBS | HEIGHT: 62 IN | DIASTOLIC BLOOD PRESSURE: 79 MMHG | TEMPERATURE: 97.7 F | HEART RATE: 94 BPM | BODY MASS INDEX: 21.9 KG/M2

## 2020-05-19 PROCEDURE — 99214 OFFICE O/P EST MOD 30 MIN: CPT | Performed by: PSYCHIATRY & NEUROLOGY

## 2020-05-19 RX ORDER — POLYETHYLENE GLYCOL 3350 17 G/17G
POWDER, FOR SOLUTION ORAL
COMMUNITY
Start: 2020-05-01 | End: 2021-09-14

## 2020-05-19 RX ORDER — ACETAMINOPHEN AND CODEINE PHOSPHATE 300; 30 MG/1; MG/1
1 TABLET ORAL EVERY 8 HOURS PRN
Qty: 9 TABLET | Refills: 0 | Status: SHIPPED | OUTPATIENT
Start: 2020-05-19 | End: 2022-03-31

## 2020-05-19 RX ORDER — AMITRIPTYLINE HYDROCHLORIDE 50 MG/1
75 TABLET, FILM COATED ORAL NIGHTLY
Qty: 45 TABLET | Refills: 3 | Status: SHIPPED | OUTPATIENT
Start: 2020-05-19 | End: 2020-10-30

## 2020-06-02 NOTE — TELEPHONE ENCOUNTER
PT NEEDS APPT    Last visit: 08/28/2019  Last Med refill: 05/05/2020  Does patient have enough medication for 72 hours: Yes      Next Visit Date:  Future Appointments   Date Time Provider Mary Jo Sewell   7/23/2020  3:00 PM Stephanie Luis MD Neuro [facility] [address] Maintenance   Topic Date Due    Varicella vaccine (1 of 2 - 2-dose childhood series) 12/02/1984    Cervical cancer screen  12/02/2004    Annual Wellness Visit (AWV)  05/29/2019    Pneumococcal 0-64 years Vaccine (1 of 1 - PPSV23) 08/28/2020 (Originally 12/2/1989)    Flu vaccine (Season Ended) 09/01/2020    DTaP/Tdap/Td vaccine (2 - Td) 04/05/2022    HIV screen  Completed    Hepatitis A vaccine  Aged Out    Hepatitis B vaccine  Aged Out    Hib vaccine  Aged Out    Meningococcal (ACWY) vaccine  Aged Out       No results found for: LABA1C          ( goal A1C is < 7)   No results found for: LABMICR  LDL Cholesterol (mg/dL)   Date Value   06/25/2018 131 (H)       (goal LDL is <100)   AST (U/L)   Date Value   01/25/2019 22     ALT (U/L)   Date Value   01/25/2019 21     BUN (mg/dL)   Date Value   01/25/2019 13     BP Readings from Last 3 Encounters:   05/19/20 124/79   11/01/19 (!) 142/107   08/28/19 122/70          (goal 120/80)    All Future Testing planned in [facility]              Patient Active Problem List:     Neurologic gait disorder     Cerebrovascular accident (CVA) ([facility] [address]     Seizure disorder ([facility] [address]     Migraine without aura and without status migrainosus, not intractable     Foot drop, left foot     History of brain shunt     Cerebral ventriculomegaly     Encephalomalacia on imaging study     H/O: stroke     Chronic idiopathic constipation     Numbness and tingling in left arm     Neck pain

## 2020-06-03 RX ORDER — CARBAMAZEPINE 200 MG/1
TABLET, EXTENDED RELEASE ORAL
Qty: 120 TABLET | Refills: 0 | Status: SHIPPED | OUTPATIENT
Start: 2020-06-03 | End: 2020-10-22 | Stop reason: SDUPTHER

## 2020-09-24 ENCOUNTER — OFFICE VISIT (OUTPATIENT)
Dept: NEUROLOGY | Age: 35
End: 2020-09-24
Payer: COMMERCIAL

## 2020-09-24 VITALS
DIASTOLIC BLOOD PRESSURE: 85 MMHG | HEIGHT: 62 IN | WEIGHT: 128.6 LBS | SYSTOLIC BLOOD PRESSURE: 127 MMHG | TEMPERATURE: 98.1 F | HEART RATE: 108 BPM | BODY MASS INDEX: 23.66 KG/M2

## 2020-09-24 PROCEDURE — 99215 OFFICE O/P EST HI 40 MIN: CPT | Performed by: PSYCHIATRY & NEUROLOGY

## 2020-09-24 RX ORDER — RIZATRIPTAN BENZOATE 5 MG/1
5 TABLET ORAL
Qty: 30 TABLET | Refills: 3 | Status: SHIPPED | OUTPATIENT
Start: 2020-09-24 | End: 2021-02-23

## 2020-09-24 RX ORDER — LAMOTRIGINE 25 MG/1
TABLET ORAL
Qty: 240 TABLET | Refills: 3 | Status: SHIPPED | OUTPATIENT
Start: 2020-09-24 | End: 2020-10-22

## 2020-09-24 NOTE — PROGRESS NOTES
[facility]            [facility], [address]; [facility], [address]    [address], [address], [place], [address] [facility]            Dept: [phone]          Dept Fax: [phone]             MD Kristan Alfaro, MD Alisha Wilson, MD Johnson Dutta, MD Claire Jurado, CNP               NEUROLOGY FOLLOW UP NOTE                                          PATIENT NAME: Angeli Webster   PATIENT MRN: [id number]  FOLLOW UP TODAY: 9/24/2020     Dear Dr. Jammie Quesada, APRN - CNP,     I had the pleasure of seeing your patient Angeli Webster, who comes for follow up. CHIEF COMPLAINT: Migraine (follow up)     INITIAL & INTERVAL HISTORY:     Boris chirinos 37year [place] 44 WF, last seen on 5/19/2020, pt returns for follow up regarding HA, seizure.      Pt came with cristhian Swain (know each other 5 years) to clinic today.      Last visit, she had had HA for 2 weeks, increased elavil 75mg QHS did not help but tylenol # 3 helped. Since last visit, her HA has become 2 times a week from previous 3-4 times a week, her HA still has no warning signs. Maximal can last for 3-4 days, located right in the middle of her forehead, sharp, throbbing, 7/10, currently no HA, her last HA was last week. She still takes excedrin migraine, she takes 2 times a week. Still bad sleep, about 4 hours a night, difficulty falling asleep. Legs move by themselves, feel like \"walking\". She can stop them. She is still on Tegretol XR 200mg BID for seizures, last seizure was 12 years ago. Mood is good. AEDs currently  Topamax 75mg QHS (for headache, not helpful)  Carbamazepine 200mg BID (since 2006)    AEDs tried  None    Seizure history  Last seizure 12 years ago.  GTC, after brain surgery in [facility] for huge cyst, had couple more seizures. CBZ was the only AED. Initial clinical visit 9/5/2018  HA  Onset: as a child  Aura/warning sgins: none  Features: sudden onset, mid frontal to right temporal, sharp, 8/10, 4 times a week, lasted for 3 days, photophobia/phonophobia/n but no vomiting, sometime left weak. Laying in dark, quiet place made better.   Risk factors: brain surgery, 3 in 2006, 2 in 2009, had shunt before, removed in 2009. No family history of HA. Stroke at age of 3 months old with residual left side weakness.   Social: lives with fiance, smoking, no drug/drinking. No working for 1 year because of migraine. Used to work at [employer]. Sleep poor, only few hours.   Psychiatric: no depression.   Other medical issues: seizure? Medications: Fioricet. excedrin migraine 2 times a week. Last time used last night.      NEUROLOGICAL TESTS  CT 11/1/2019  NAF, dysmorphic enlarged right lateral ventricule with multiple sub sail shin cyst unchanged. [facility] 4/20/2017  Since the older study the intracranial shunt has been removed.  The right ventriculomegaly with encephalomalacia is, however, stable from 4/26/2007.  The remainder the brain is stable. No acute intracranial pathology.          No EEG in record. PMH/PSH/SH/FMH: Remain unchanged since last visit except those listed in the interval history    Admission on 11/01/2019, Discharged on 11/01/2019   Component Date Value Ref Range Status    Preg Test, Ur 11/01/2019 negative   Final    QC OK? 11/01/2019 ok   Final    HCG, Pregnancy Urine (POC) 11/01/2019 NEGATIVE  NEGATIVE Final    Comment:    HCG screen is sensitive to 25 mIU/mL. However this test may  lower levels  of HCG. If further evaluation is needed  please request quantitative HCG.  - 11/01/2019 NOT REPORTED   Final    except those listed in the interval history.        Diagnosis Date    Headache     Meningitis spinal     Seizures (HCC)     Stroke (HCC)         ALLERGIES:   Allergies Allergen Reactions    Bactrim [Sulfamethoxazole-Trimethoprim] Hives     hives       MEDICATIONS:   Current Outpatient Medications   Medication Sig Dispense Refill    carBAMazepine (TEGRETOL XR) 200 MG extended release tablet TAKE 2 TABLETS BY MOUTH 2 TIMES DAILY 120 tablet 0    amitriptyline (ELAVIL) 50 MG tablet Take 1.5 tablets by mouth nightly 45 tablet 3    magnesium oxide (MAG-OX) 400 MG tablet TAKE 1 TABLET BY MOUTH DAILY 90 tablet 1    albuterol sulfate  (90 Base) MCG/ACT inhaler INHALE 2 PUFFS INTO THE LUNGS 4 TIMES DAILY AS NEEDED FOR WHEEZING 54 g 5    cyclobenzaprine (FLEXERIL) 5 MG tablet Take 1 tablet by mouth 2 times daily as needed for Muscle spasms 60 tablet 2    polyethylene glycol (GLYCOLAX) 17 GM/SCOOP powder TAKE 17 GRAMS BY MOUTH DAILY      nicotine (NICODERM CQ) 14 MG/24HR PLACE 1 PATCH ONTO THE SKIN DAILY (Patient not taking: Reported on 9/24/2020) 42 patch 0     No current facility-administered medications for this visit.         LABS & TESTS:      Lab Results   Component Value Date    WBC 5.4 01/25/2019    HGB 15.4 01/25/2019    HCT 44.5 01/25/2019    MCV 88.0 01/25/2019     01/25/2019       REVIEW OF SYSTEMS:      CONSTITUTIONAL Weight change: absent, Appetite change: absent, Fatigue: absent    HEENT Ears: normal, Visual disturbance: absent    RESPIRATORY Shortness of breath: absent, Cough: absent    CARDIOVASCULAR Chest pain: absent, Leg swelling :absent    GI Constipation: absent, Diarrhea: absent, Swallowing change: absent     Urinary frequency: absent, Urinary urgency: absent, Urinary incontinence: absent    MUSCULOSKELETAL Neck pain: absent, Back pain: absent, Stiffness: absent, Muscle pain: absent, Joint pain: absent Restless legs: absent    DERMATOLOGIC Hair loss: absent, Skin changes: absent    NEUROLOGIC Memory loss: absent, Confusion: absent, Seizures: absent Trouble walking or imbalance: absent, Dizziness: absent, Weakness: absent, Numbness: absent Tremor: absent, Spasm: absent, Speech difficulty: absent, Headache: present, Light sensitivity: present    PSYCHIATRIC Anxiety: absent, Hallucination: absent, Mood disorder: absent    HEMATOLOGIC Abnormal bleeding: absent, Anemia: absent, Clotting disorder: absent, Lymph gland changes: absent     VITALS  /85 (Site: Right Upper Arm, Position: Sitting, Cuff Size: Medium Adult)   Pulse 108   Temp 98.1 °F (36.7 °C)   Ht 5' 2\" (1.575 m)   Wt 128 lb 9.6 oz (58.3 kg)   BMI 23.52 kg/m²        PHYSICAL EXAMINATIONS:     General appearance: cooperative  Skin: no rash or skin lesions. HEENT: normocephalic  Optic Fundi: deferred  Neck: supple, no cervcical adenopathy or carotid bruit  Lungs: clear to auscultation  Heart: Regular rate and rhythm, normal S1, S2. No murmurs, clicks or gallops. Peripheral pulses: radial pulses palpable  Abdominal: BS present, soft, NT, ND  Extremities: no edema    NEUROLOGICAL EXAMINATION:     MS: awake, alert and oriented. No aphasia, dysarthria, or neglect  CNs: PERRLA, EOMI, VF full, sensation intact, face symmetric, hearing intact, soft palate rises on phonation, sternocleidomastoid and trapezius intact. Tongue midline, no fasciculations. Motor: no abnormal movements, tone and bulk okay. RUE: delta 5/5, biceps 5/5, triceps 5/5,  5/5  LUE: delta 5/5, biceps 5/5, triceps 4+/5,  4+/5  RLE: hf 5/5, ke 5/5, df 5/5, pf 5/5  LLE: hf 4+/5, ke 4+/5, df 4+/5, pf 4+/5  Reflexes: 2+ on right, brisk on left, babinski not present. Coordination: FNF no dysmetria, heel to shin okay, CARLY okay, negative Rhomberg. Gait: scissor gait, limp on left, Tandem defered  Sensory: Normal to light touch/temp/pp/vibration, intact joint position sense, no extinction. ASSRSSMENT/PLANS:      // HA  - history of multiple brain surgeries.    - enlarged right lateral ventricle with encephalomalacia of right parietal.   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     // History of seizures  - unknown semiology  - has been on CBZ 200mg BID since 2006, continue  - no EEG in system  - seizure precaution        >50% of 40 minute face to face time spent counseling patient, multiple issues discussed, all questions answered.      RTC in 3-4 months      Alice Sorensen MD, MS

## 2020-09-24 NOTE — PATIENT INSTRUCTIONS
1. Start lamictal as following  Week 1: 25mg at bedtime  Week 2: 50mg daily at bedtime  Week 3: 75mg daily at bedtime  Week 4: 100mg daily at bedtime  Week 5: 50mg morning, 100mg evening  From week 6: 100mg twice a day  2. Maxalt at time when headache is just start, may repeat once in 2 hours, no more than 2 pills a day, no more than 2 days a week  3. Try benadryl (12.5mg to 25mg)or melatonin at bedtime as needed, make sure sufficient sleep  4. Keep headache log  5.  Continue carbamazepine     Return in 3-4 months    Tanika Corbett MD, MS

## 2020-10-13 ENCOUNTER — TELEPHONE (OUTPATIENT)
Dept: FAMILY MEDICINE CLINIC | Age: 35
End: 2020-10-13

## 2020-10-13 NOTE — TELEPHONE ENCOUNTER
Patient was seen in [place] [facility] ED over the weekend for abdominal pain. She was told that it was an egg that never fertilized and she was told that once she started her period it would go away but she is still in a lot of pain. She would like to see if she can get something for pain. Please advise.     RX: [facility] [address]

## 2020-10-19 ENCOUNTER — TELEPHONE (OUTPATIENT)
Dept: NEUROLOGY | Age: 35
End: 2020-10-19

## 2020-10-19 RX ORDER — IBUPROFEN 800 MG/1
800 TABLET ORAL 2 TIMES DAILY PRN
Qty: 30 TABLET | Refills: 0 | Status: SHIPPED | OUTPATIENT
Start: 2020-10-19 | End: 2021-09-27

## 2020-10-19 NOTE — TELEPHONE ENCOUNTER
Please Approve or Refuse.   Send to Pharmacy per Pt's Request:     Next Visit Date:  Visit date not found   Last Visit Date: 8/28/2019    No results found for: LABA1C          ( goal A1C is < 7)   BP Readings from Last 3 Encounters:   09/24/20 127/85   05/19/20 124/79   11/01/19 (!) 142/107          (goal 120/80)  BUN   Date Value Ref Range Status   01/25/2019 13 6 - 20 mg/dL Final     CREATININE   Date Value Ref Range Status   01/25/2019 0.55 0.50 - 0.90 mg/dL Final     Potassium   Date Value Ref Range Status   01/25/2019 3.9 3.7 - 5.3 mmol/L Final

## 2020-10-20 RX ORDER — L. ACIDOPHILUS/PECTIN, CITRUS 25MM-100MG
TABLET ORAL
Qty: 90 TABLET | Refills: 3 | Status: SHIPPED | OUTPATIENT
Start: 2020-10-20 | End: 2021-05-04

## 2020-10-22 ENCOUNTER — OFFICE VISIT (OUTPATIENT)
Dept: FAMILY MEDICINE CLINIC | Age: 35
End: 2020-10-22
Payer: COMMERCIAL

## 2020-10-22 VITALS
HEIGHT: 62 IN | SYSTOLIC BLOOD PRESSURE: 110 MMHG | DIASTOLIC BLOOD PRESSURE: 78 MMHG | OXYGEN SATURATION: 98 % | BODY MASS INDEX: 24.4 KG/M2 | HEART RATE: 104 BPM | TEMPERATURE: 98.1 F | WEIGHT: 132.6 LBS

## 2020-10-22 PROCEDURE — 99213 OFFICE O/P EST LOW 20 MIN: CPT | Performed by: NURSE PRACTITIONER

## 2020-10-22 RX ORDER — CARBAMAZEPINE 200 MG/1
TABLET, EXTENDED RELEASE ORAL
Qty: 120 TABLET | Refills: 5 | Status: SHIPPED | OUTPATIENT
Start: 2020-10-22 | End: 2021-03-16

## 2020-10-22 RX ORDER — HYDROXYZINE HYDROCHLORIDE 25 MG/1
25 TABLET, FILM COATED ORAL EVERY 8 HOURS PRN
Qty: 30 TABLET | Refills: 0 | Status: SHIPPED | OUTPATIENT
Start: 2020-10-22 | End: 2022-03-31

## 2020-10-22 SDOH — ECONOMIC STABILITY: FOOD INSECURITY: WITHIN THE PAST 12 MONTHS, THE FOOD YOU BOUGHT JUST DIDN'T LAST AND YOU DIDN'T HAVE MONEY TO GET MORE.: NEVER TRUE

## 2020-10-22 SDOH — ECONOMIC STABILITY: TRANSPORTATION INSECURITY
IN THE PAST 12 MONTHS, HAS THE LACK OF TRANSPORTATION KEPT YOU FROM MEDICAL APPOINTMENTS OR FROM GETTING MEDICATIONS?: NO

## 2020-10-22 SDOH — ECONOMIC STABILITY: TRANSPORTATION INSECURITY
IN THE PAST 12 MONTHS, HAS LACK OF TRANSPORTATION KEPT YOU FROM MEETINGS, WORK, OR FROM GETTING THINGS NEEDED FOR DAILY LIVING?: NO

## 2020-10-22 SDOH — ECONOMIC STABILITY: FOOD INSECURITY: WITHIN THE PAST 12 MONTHS, YOU WORRIED THAT YOUR FOOD WOULD RUN OUT BEFORE YOU GOT MONEY TO BUY MORE.: NEVER TRUE

## 2020-10-22 SDOH — ECONOMIC STABILITY: INCOME INSECURITY: HOW HARD IS IT FOR YOU TO PAY FOR THE VERY BASICS LIKE FOOD, HOUSING, MEDICAL CARE, AND HEATING?: NOT VERY HARD

## 2020-10-22 ASSESSMENT — PATIENT HEALTH QUESTIONNAIRE - PHQ9
2. FEELING DOWN, DEPRESSED OR HOPELESS: 0
SUM OF ALL RESPONSES TO PHQ QUESTIONS 1-9: 0
1. LITTLE INTEREST OR PLEASURE IN DOING THINGS: 0
SUM OF ALL RESPONSES TO PHQ QUESTIONS 1-9: 0
SUM OF ALL RESPONSES TO PHQ QUESTIONS 1-9: 0
SUM OF ALL RESPONSES TO PHQ9 QUESTIONS 1 & 2: 0

## 2020-10-22 NOTE — PROGRESS NOTES
Visit Information    Have you changed or started any medications since your last visit including any over-the-counter medicines, vitamins, or herbal medicines? no   Are you having any side effects from any of your medications? -  no  Have you stopped taking any of your medications? Is so, why? -  no    Have you seen any other physician or provider since your last visit? No  Have you had any other diagnostic tests since your last visit? Yes - Records Obtained  Have you been seen in the emergency room and/or had an admission to a hospital since we last saw you? Yes - Records Obtained  Have you had your routine dental cleaning in the past 6 months? no    Have you activated your [employer] account? If not, what are your barriers?  Yes     Patient Care Team:  MIKE Torres CNP as PCP - General  MIKE Torres CNP [facility] PCP [facility]  Meryle Abt, MD as Consulting Physician (Neurology)    Medical History Review  Past Medical, Family, and Social History reviewed and does contribute to the patient presenting condition    Health Maintenance   Topic Date Due    Varicella vaccine (1 of 2 - 2-dose childhood series) 12/02/1984    Pneumococcal 0-64 years Vaccine (1 of 1 - PPSV23) 12/02/1989    Cervical cancer screen  12/02/2004    Annual Wellness Visit (AWV)  05/29/2019    Flu vaccine (1) 09/01/2020    DTaP/Tdap/Td vaccine (2 - Td) 04/05/2022    HIV screen  Completed    Hepatitis A vaccine  Aged Out    Hepatitis B vaccine  Aged Out    Hib vaccine  Aged Out    Meningococcal (ACWY) vaccine  Aged Out

## 2020-10-25 ENCOUNTER — PATIENT MESSAGE (OUTPATIENT)
Dept: FAMILY MEDICINE CLINIC | Age: 35
End: 2020-10-25

## 2020-10-26 ASSESSMENT — ENCOUNTER SYMPTOMS: RESPIRATORY NEGATIVE: 1

## 2020-10-26 NOTE — TELEPHONE ENCOUNTER
From: Clotilde Cowden  To: MIKE Montes De Oca CNP  Sent: 10/25/2020 7:46 AM EDT  Subject: Prescription Question    The meds you put me on i took fri night and i still feel out of it i only took seizure medicine last night what should i do?

## 2020-10-26 NOTE — PROGRESS NOTES
[address]  [place] [facility]  [address] [address]  Dept: [phone]  Dept Fax: [phone]      Pema Wang is a 37 y.o. female who presents today for hermedical conditions/complaints as noted below. Pema Wang is c/o of ED Follow-up (10/10, egg stuck) and Rash            HPI:      HPI  Chandana Eubanks is here today for rash. She was seen in ER for a rash that started after she had a medication change. Rash is red and pruritic. Rash is on trunk, arms, thighs. She was treated with decadron injection and benadryl in the ER. She was sent home with benadryl. For unknown reasons she was also discharged from [facility] ER with a script for Keppra. She has not been on KEppra and her seizers have been well maintained on carbamazepine. She sees Dr. Omayra Morales for her seizure disorder.    No results found for: LABA1C          ( goal A1Cis < 7)   No results found for: LABMICR  LDL Cholesterol (mg/dL)   Date Value   06/25/2018 131 (H)       (goal LDL is <100)   AST (U/L)   Date Value   01/25/2019 22     ALT (U/L)   Date Value   01/25/2019 21     BUN (mg/dL)   Date Value   01/25/2019 13     BP Readings from Last 3 Encounters:   10/22/20 110/78   09/24/20 127/85   05/19/20 124/79          (goal 120/80)    Past Medical History:   Diagnosis Date    Headache     Meningitis spinal     Seizures (HCC)     Stroke [facility])       Past Surgical History:   Procedure Laterality Date    BRAIN SURGERY      cyst and fluid build up    SHUNT REMOVAL         Family History   Problem Relation Age of Onset    Migraines Mother     Heart Disease Mother     Heart Attack Father     Heart Disease Father     High Blood Pressure Maternal Grandmother     Cancer Maternal Grandfather           Social History     Tobacco Use    Smoking status: Light Tobacco Smoker     Packs/day: 0.25    Smokeless tobacco: Never Used   Substance Use Topics    Alcohol use: No         Current Outpatient Medications Medication Sig Dispense Refill    carBAMazepine (TEGRETOL XR) 200 MG extended release tablet TAKE 2 TABLETS BY MOUTH 2 TIMES DAILY 120 tablet 5    hydrOXYzine (ATARAX) 25 MG tablet Take 1 tablet by mouth every 8 hours as needed for Itching 30 tablet 0    Lactobacillus Acid-Pectin (ACIDOPHILUS/CITRUS PECTIN) TABS TAKE 1 TABLET/CAPLET BY MOUTH DAILY 90 tablet 3    ibuprofen (ADVIL;MOTRIN) 800 MG tablet Take 1 tablet by mouth 2 times daily as needed for Pain 30 tablet 0    polyethylene glycol (GLYCOLAX) 17 GM/SCOOP powder TAKE 17 GRAMS BY MOUTH DAILY      amitriptyline (ELAVIL) 50 MG tablet Take 1.5 tablets by mouth nightly 45 tablet 3    magnesium oxide (MAG-OX) 400 MG tablet TAKE 1 TABLET BY MOUTH DAILY 90 tablet 1    albuterol sulfate  (90 Base) MCG/ACT inhaler INHALE 2 PUFFS INTO THE LUNGS 4 TIMES DAILY AS NEEDED FOR WHEEZING 54 g 5    cyclobenzaprine (FLEXERIL) 5 MG tablet Take 1 tablet by mouth 2 times daily as needed for Muscle spasms 60 tablet 2    rizatriptan (MAXALT) 5 MG tablet Take 1 tablet by mouth once as needed for Migraine May repeat in 2 hours if needed 30 tablet 3    nicotine (NICODERM CQ) 14 MG/24HR PLACE 1 PATCH ONTO THE SKIN DAILY (Patient not taking: Reported on 9/24/2020) 42 patch 0     No current facility-administered medications for this visit.       Allergies   Allergen Reactions    Bactrim [Sulfamethoxazole-Trimethoprim] Hives     hives       Health Maintenance   Topic Date Due    Varicella vaccine (1 of 2 - 2-dose childhood series) 12/02/1984    Pneumococcal 0-64 years Vaccine (1 of 1 - PPSV23) 12/02/1989    Cervical cancer screen  12/02/2004    Annual Wellness Visit (AWV)  05/29/2019    Flu vaccine (1) 09/01/2020    DTaP/Tdap/Td vaccine (2 - Td) 04/05/2022    HIV screen  Completed    Hepatitis A vaccine  Aged Out    Hepatitis B vaccine  Aged Out    Hib vaccine  Aged Out    Meningococcal (ACWY) vaccine  Aged Out          Review of Systems   Constitutional: Negative. Respiratory: Negative. Cardiovascular: Negative. Musculoskeletal: Negative. Skin: Positive for rash. Neurological: Positive for seizures. Hematological: Negative. Psychiatric/Behavioral: Negative. Objective:     /78   Pulse 104   Temp 98.1 °F (36.7 °C) (Temporal)   Ht 5' 2\" (1.575 m)   Wt 132 lb 9.6 oz (60.1 kg)   LMP 10/20/2020 (Exact Date)   SpO2 98%   BMI 24.25 kg/m²   Physical Exam  Constitutional:       Appearance: She is well-developed. HENT:      Head: Normocephalic. Eyes:      Conjunctiva/sclera: Conjunctivae normal.   Cardiovascular:      Rate and Rhythm: Normal rate and regular rhythm. Pulmonary:      Effort: Pulmonary effort is normal.      Breath sounds: Normal breath sounds. Skin:     General: Skin is warm and dry. Comments: Maculopapular erythematic rash noted to areas indicated. Neurological:      Mental Status: She is alert and oriented to person, place, and time. Psychiatric:         Behavior: Behavior normal.         Thought Content: Thought content normal.         Judgment: Judgment normal.           Assessment:      1. Pruritic rash    2. Seizure disorder ([facility]                     Plan:      No orders of the defined types were placed in this encounter. 1. Pruritic rash    - hydrOXYzine (ATARAX) 25 MG tablet; Take 1 tablet by mouth every 8 hours as needed for Itching  Dispense: 30 tablet; Refill: 0    2. Seizure disorder ([facility]  Stop keppra = continue carbamazepine. - carBAMazepine (TEGRETOL XR) 200 MG extended release tablet; TAKE 2 TABLETS BY MOUTH 2 TIMES DAILY  Dispense: 120 tablet; Refill: 5        Return in about 6 months (around 4/22/2021) for Zack Quiroz 97. Patient given educational materials - see patientinstructions. Discussed use, benefit, and side effects of prescribed medications. All patient questions answered. Pt voiced understanding. Reviewed health maintenance. Instructed to continue current medications, diet and exercise. Patient agreedwith treatment plan. Follow up as directed.      Electronically signed by MIKE Allan CNP on 10/26/2020

## 2020-10-29 ENCOUNTER — APPOINTMENT (OUTPATIENT)
Dept: CT IMAGING | Age: 35
End: 2020-10-29
Payer: COMMERCIAL

## 2020-10-29 ENCOUNTER — HOSPITAL ENCOUNTER (EMERGENCY)
Age: 35
Discharge: HOME HEALTH | End: 2020-10-29
Attending: EMERGENCY MEDICINE
Payer: COMMERCIAL

## 2020-10-29 VITALS
DIASTOLIC BLOOD PRESSURE: 94 MMHG | HEIGHT: 62 IN | HEART RATE: 111 BPM | BODY MASS INDEX: 23 KG/M2 | OXYGEN SATURATION: 98 % | RESPIRATION RATE: 16 BRPM | WEIGHT: 125 LBS | SYSTOLIC BLOOD PRESSURE: 119 MMHG | TEMPERATURE: 98.1 F

## 2020-10-29 LAB
ABSOLUTE EOS #: 0.3 K/UL (ref 0–0.4)
ABSOLUTE IMMATURE GRANULOCYTE: ABNORMAL K/UL (ref 0–0.3)
ABSOLUTE LYMPH #: 2.5 K/UL (ref 1–4.8)
ABSOLUTE MONO #: 1.1 K/UL (ref 0.1–1.3)
ALBUMIN SERPL-MCNC: 4.3 G/DL (ref 3.5–5.2)
ALBUMIN/GLOBULIN RATIO: ABNORMAL (ref 1–2.5)
ALP BLD-CCNC: 101 U/L (ref 35–104)
ALT SERPL-CCNC: 20 U/L (ref 5–33)
ANION GAP SERPL CALCULATED.3IONS-SCNC: 13 MMOL/L (ref 9–17)
AST SERPL-CCNC: 21 U/L
BASOPHILS # BLD: 1 % (ref 0–2)
BASOPHILS ABSOLUTE: 0.1 K/UL (ref 0–0.2)
BILIRUB SERPL-MCNC: <0.15 MG/DL (ref 0.3–1.2)
BUN BLDV-MCNC: 10 MG/DL (ref 6–20)
BUN/CREAT BLD: ABNORMAL (ref 9–20)
CALCIUM SERPL-MCNC: 9 MG/DL (ref 8.6–10.4)
CHLORIDE BLD-SCNC: 100 MMOL/L (ref 98–107)
CO2: 25 MMOL/L (ref 20–31)
CREAT SERPL-MCNC: 0.56 MG/DL (ref 0.5–0.9)
DIFFERENTIAL TYPE: ABNORMAL
EOSINOPHILS RELATIVE PERCENT: 2 % (ref 0–4)
GFR AFRICAN AMERICAN: >60 ML/MIN
GFR NON-AFRICAN AMERICAN: >60 ML/MIN
GFR SERPL CREATININE-BSD FRML MDRD: ABNORMAL ML/MIN/{1.73_M2}
GFR SERPL CREATININE-BSD FRML MDRD: ABNORMAL ML/MIN/{1.73_M2}
GLUCOSE BLD-MCNC: 95 MG/DL (ref 65–105)
GLUCOSE BLD-MCNC: 98 MG/DL (ref 70–99)
HCG QUALITATIVE: NEGATIVE
HCT VFR BLD CALC: 40.5 % (ref 36–46)
HEMOGLOBIN: 14 G/DL (ref 12–16)
IMMATURE GRANULOCYTES: ABNORMAL %
LYMPHOCYTES # BLD: 19 % (ref 24–44)
MCH RBC QN AUTO: 29.5 PG (ref 26–34)
MCHC RBC AUTO-ENTMCNC: 34.6 G/DL (ref 31–37)
MCV RBC AUTO: 85.1 FL (ref 80–100)
MONOCYTES # BLD: 8 % (ref 1–7)
NRBC AUTOMATED: ABNORMAL PER 100 WBC
PDW BLD-RTO: 13.7 % (ref 11.5–14.9)
PLATELET # BLD: 258 K/UL (ref 150–450)
PLATELET ESTIMATE: ABNORMAL
PMV BLD AUTO: 7.2 FL (ref 6–12)
POTASSIUM SERPL-SCNC: 3.6 MMOL/L (ref 3.7–5.3)
RBC # BLD: 4.76 M/UL (ref 4–5.2)
RBC # BLD: ABNORMAL 10*6/UL
SEG NEUTROPHILS: 70 % (ref 36–66)
SEGMENTED NEUTROPHILS ABSOLUTE COUNT: 9.4 K/UL (ref 1.3–9.1)
SODIUM BLD-SCNC: 138 MMOL/L (ref 135–144)
TOTAL PROTEIN: 7.5 G/DL (ref 6.4–8.3)
WBC # BLD: 13.4 K/UL (ref 3.5–11)
WBC # BLD: ABNORMAL 10*3/UL

## 2020-10-29 PROCEDURE — 93005 ELECTROCARDIOGRAM TRACING: CPT | Performed by: EMERGENCY MEDICINE

## 2020-10-29 PROCEDURE — 6360000002 HC RX W HCPCS: Performed by: EMERGENCY MEDICINE

## 2020-10-29 PROCEDURE — 70450 CT HEAD/BRAIN W/O DYE: CPT

## 2020-10-29 PROCEDURE — 36415 COLL VENOUS BLD VENIPUNCTURE: CPT

## 2020-10-29 PROCEDURE — 84703 CHORIONIC GONADOTROPIN ASSAY: CPT

## 2020-10-29 PROCEDURE — 2580000003 HC RX 258: Performed by: EMERGENCY MEDICINE

## 2020-10-29 PROCEDURE — 96374 THER/PROPH/DIAG INJ IV PUSH: CPT

## 2020-10-29 PROCEDURE — 85025 COMPLETE CBC W/AUTO DIFF WBC: CPT

## 2020-10-29 PROCEDURE — 82947 ASSAY GLUCOSE BLOOD QUANT: CPT

## 2020-10-29 PROCEDURE — 80053 COMPREHEN METABOLIC PANEL: CPT

## 2020-10-29 PROCEDURE — 99284 EMERGENCY DEPT VISIT MOD MDM: CPT

## 2020-10-29 RX ORDER — KETOROLAC TROMETHAMINE 30 MG/ML
30 INJECTION, SOLUTION INTRAMUSCULAR; INTRAVENOUS ONCE
Status: COMPLETED | OUTPATIENT
Start: 2020-10-29 | End: 2020-10-29

## 2020-10-29 RX ORDER — 0.9 % SODIUM CHLORIDE 0.9 %
1000 INTRAVENOUS SOLUTION INTRAVENOUS ONCE
Status: COMPLETED | OUTPATIENT
Start: 2020-10-29 | End: 2020-10-29

## 2020-10-29 RX ADMIN — SODIUM CHLORIDE 1000 ML: 9 INJECTION, SOLUTION INTRAVENOUS at 21:29

## 2020-10-29 RX ADMIN — KETOROLAC TROMETHAMINE 30 MG: 30 INJECTION, SOLUTION INTRAMUSCULAR; INTRAVENOUS at 21:30

## 2020-10-29 ASSESSMENT — PAIN DESCRIPTION - PAIN TYPE
TYPE: ACUTE PAIN
TYPE: ACUTE PAIN

## 2020-10-29 ASSESSMENT — ENCOUNTER SYMPTOMS
ABDOMINAL PAIN: 0
BACK PAIN: 0
EYE PAIN: 0
COLOR CHANGE: 0
SHORTNESS OF BREATH: 0

## 2020-10-29 ASSESSMENT — PAIN SCALES - GENERAL
PAINLEVEL_OUTOF10: 9
PAINLEVEL_OUTOF10: 9

## 2020-10-29 ASSESSMENT — PAIN DESCRIPTION - LOCATION
LOCATION: HEAD
LOCATION: HEAD

## 2020-10-30 ENCOUNTER — TELEPHONE (OUTPATIENT)
Dept: NEUROLOGY | Age: 35
End: 2020-10-30

## 2020-10-30 ENCOUNTER — TELEPHONE (OUTPATIENT)
Dept: FAMILY MEDICINE CLINIC | Age: 35
End: 2020-10-30

## 2020-10-30 ENCOUNTER — PATIENT MESSAGE (OUTPATIENT)
Dept: FAMILY MEDICINE CLINIC | Age: 35
End: 2020-10-30

## 2020-10-30 LAB
EKG ATRIAL RATE: 115 BPM
EKG P AXIS: 69 DEGREES
EKG P-R INTERVAL: 160 MS
EKG Q-T INTERVAL: 324 MS
EKG QRS DURATION: 86 MS
EKG QTC CALCULATION (BAZETT): 448 MS
EKG R AXIS: 94 DEGREES
EKG T AXIS: 57 DEGREES
EKG VENTRICULAR RATE: 115 BPM

## 2020-10-30 PROCEDURE — 93010 ELECTROCARDIOGRAM REPORT: CPT | Performed by: INTERNAL MEDICINE

## 2020-10-30 RX ORDER — LEVETIRACETAM 250 MG/1
TABLET ORAL
Qty: 45 TABLET | Refills: 0 | Status: SHIPPED | OUTPATIENT
Start: 2020-10-30 | End: 2021-10-21

## 2020-10-30 RX ORDER — AMITRIPTYLINE HYDROCHLORIDE 50 MG/1
75 TABLET, FILM COATED ORAL NIGHTLY
Qty: 45 TABLET | Refills: 3 | Status: SHIPPED | OUTPATIENT
Start: 2020-10-30 | End: 2021-03-25

## 2020-10-30 NOTE — ED PROVIDER NOTES
EMERGENCY DEPARTMENT ENCOUNTER    Pt Name: Azam Jung  MRN: [id number]  [place] 1985  Date of evaluation: 10/29/20  CHIEF COMPLAINT       Chief Complaint   Patient presents with    Seizures    Numbness    Extremity Weakness     HISTORY OF PRESENT ILLNESS   26-year-old female presents with complaint of seizure. Patient was reportedly at home and had a generalized tonic-clonic seizure that lasted approximately 1 minute. Seizure resolved spontaneously patient then had another seizure after that again lasting approximately 1 minute which was generalized tonic-clonic seizure activity in nature. After these episodes patient was having some mild weakness in her left arm, patient denies other complaints. Patient does have a history of seizure, patient states she takes Tegretol, patient states she did have a recent dose adjustment. Patient denies missing any doses of her medication, denies any recent illness, excessive alcohol intake, change in her sleep patterns, or other seizure triggers. Patient states she has not had a seizure in approximately 12 years. The history is provided by the patient and a relative (Fiancé). REVIEW OF SYSTEMS     Review of Systems   Constitutional: Negative for fever. HENT: Negative for congestion and ear pain. Eyes: Negative for pain. Respiratory: Negative for shortness of breath. Cardiovascular: Negative for chest pain, palpitations and leg swelling. Gastrointestinal: Negative for abdominal pain. Genitourinary: Negative for dysuria and flank pain. Musculoskeletal: Negative for back pain. Skin: Negative for color change. Neurological: Positive for seizures and numbness. Negative for headaches. Psychiatric/Behavioral: Negative for confusion. All other systems reviewed and are negative.     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    Cerebrovascular accident (CVA) ([facility] I63.9    Seizure disorder ([facility] G40.909    Migraine without aura and without status migrainosus, not intractable G43.009    Foot drop, left foot M21.372    History of brain shunt Z98.2    Cerebral ventriculomegaly G93.89    Encephalomalacia on imaging study G93.89    H/O: stroke Z86.73    Chronic idiopathic constipation K59.04    Numbness and tingling in left arm R20.0, R20.2    Neck pain M54.2     SURGICAL HISTORY       Past Surgical History:   Procedure Laterality Date    BRAIN SURGERY      cyst and fluid build up    SHUNT REMOVAL       CURRENT MEDICATIONS       Previous Medications    ALBUTEROL SULFATE  (90 BASE) MCG/ACT INHALER    INHALE 2 PUFFS INTO THE LUNGS 4 TIMES DAILY AS NEEDED FOR WHEEZING    AMITRIPTYLINE (ELAVIL) 50 MG TABLET    Take 1.5 tablets by mouth nightly    CARBAMAZEPINE (TEGRETOL XR) 200 MG EXTENDED RELEASE TABLET    TAKE 2 TABLETS BY MOUTH 2 TIMES DAILY    CYCLOBENZAPRINE (FLEXERIL) 5 MG TABLET    Take 1 tablet by mouth 2 times daily as needed for Muscle spasms    HYDROXYZINE (ATARAX) 25 MG TABLET    Take 1 tablet by mouth every 8 hours as needed for Itching    IBUPROFEN (ADVIL;MOTRIN) 800 MG TABLET    Take 1 tablet by mouth 2 times daily as needed for Pain    LACTOBACILLUS ACID-PECTIN (ACIDOPHILUS/CITRUS PECTIN) TABS    TAKE 1 TABLET/CAPLET BY MOUTH DAILY    MAGNESIUM OXIDE (MAG-OX) 400 MG TABLET    TAKE 1 TABLET BY MOUTH DAILY    NICOTINE (NICODERM CQ) 14 MG/24HR    PLACE 1 PATCH ONTO THE SKIN DAILY    POLYETHYLENE GLYCOL (GLYCOLAX) 17 GM/SCOOP POWDER    TAKE 17 GRAMS BY MOUTH DAILY    RIZATRIPTAN (MAXALT) 5 MG TABLET    Take 1 tablet by mouth once as needed for Migraine May repeat in 2 hours if needed     ALLERGIES     is allergic to bactrim [sulfamethoxazole-trimethoprim]. FAMILY HISTORY     She indicated that her mother is alive.  She indicated that her father is alive. She indicated that her maternal grandmother is alive. She indicated that her maternal grandfather is . She indicated that her paternal grandmother is . She indicated that her paternal grandfather is . SOCIAL HISTORY       Social History     Tobacco Use    Smoking status: Light Tobacco Smoker     Packs/day: 0.25    Smokeless tobacco: Never Used   Substance Use Topics    Alcohol use: No    Drug use: No     PHYSICAL EXAM     INITIAL VITALS: BP (!) 137/99   Pulse 122   Temp 98.1 °F (36.7 °C) (Oral)   Resp 18   Ht 5' 2\" (1.575 m)   Wt 125 lb (56.7 kg)   LMP 10/20/2020 (Exact Date)   SpO2 100%   BMI 22.86 kg/m²    Physical Exam  Vitals signs and nursing note reviewed. Constitutional:       General: She is not in acute distress. Appearance: Normal appearance. She is not toxic-appearing. HENT:      Head: Normocephalic and atraumatic. Nose: Nose normal.      Mouth/Throat:      Mouth: Mucous membranes are moist.      Pharynx: Oropharynx is clear. Eyes:      Extraocular Movements: Extraocular movements intact. Conjunctiva/sclera: Conjunctivae normal.   Neck:      Musculoskeletal: Normal range of motion. Cardiovascular:      Rate and Rhythm: Regular rhythm. Tachycardia present. Pulses: Normal pulses. Heart sounds: Normal heart sounds. Pulmonary:      Effort: Pulmonary effort is normal.      Breath sounds: Normal breath sounds. Abdominal:      General: Bowel sounds are normal. There is no distension. Palpations: Abdomen is soft. Tenderness: There is no abdominal tenderness. Musculoskeletal: Normal range of motion. Skin:     General: Skin is warm and dry. Capillary Refill: Capillary refill takes less than 2 seconds. Neurological:      General: No focal deficit present. Mental Status: She is alert.    Psychiatric:         Mood and Affect: Mood normal.         MEDICAL DECISION MAKIN-year-old man CT, MRI, and formal ultrasound images (except ED bedside ultrasound) are read by the radiologist, see reports below, unless otherwisenoted in MDM or here. CT Head WO Contrast   Final Result   No acute intracranial abnormality. Chronic findings as above. LABS: All lab results were reviewed by myself, and all abnormals are listed below. Labs Reviewed   CBC WITH AUTO DIFFERENTIAL - Abnormal; Notable for the following components:       Result Value    WBC 13.4 (*)     Seg Neutrophils 70 (*)     Lymphocytes 19 (*)     Monocytes 8 (*)     Segs Absolute 9.40 (*)     All other components within normal limits   COMPREHENSIVE METABOLIC PANEL W/ REFLEX TO MG FOR LOW K - Abnormal; Notable for the following components:    Potassium 3.6 (*)     Total Bilirubin <0.15 (*)     All other components within normal limits   HCG, SERUM, QUALITATIVE   URINALYSIS   POC GLUCOSE FINGERSTICK       EMERGENCY DEPARTMENTCOURSE:         Vitals:    Vitals:    10/29/20 1956   BP: (!) 137/99   Pulse: 122   Resp: 18   Temp: 98.1 °F (36.7 °C)   TempSrc: Oral   SpO2: 100%   Weight: 125 lb (56.7 kg)   Height: 5' 2\" (1.575 m)       The patient was given the following medications while in the emergency department:  Orders Placed This Encounter   Medications    0.9 % sodium chloride bolus    ketorolac (TORADOL) injection 30 mg     CONSULTS:  None    FINAL IMPRESSION      1. Breakthrough seizure [facility])          DISPOSITION/PLAN   DISPOSITION Discharge - Pending Orders Complete 10/29/2020 09:03:30 PM      PATIENT REFERRED TO:  No follow-up provider specified.   DISCHARGE MEDICATIONS:  New Prescriptions    No medications on file     Duncan Buckner DO  Attending Emergency Physician                  Duncan Buckner DO  10/30/20 [id number]

## 2020-10-30 NOTE — TELEPHONE ENCOUNTER
Pharmacy requesting refill of Amitriptyline  50 mg.      Medication active on med list yes      Date last ordered: 5/19/2020  verified on 10/30/2020  verified by GET Yi LPN      Date of last appointment 9/24/2020    Next Visit Date:  11/5/2020

## 2020-10-30 NOTE — TELEPHONE ENCOUNTER
I just spoke to [facility], she said currently she is on Tegretol XR 400mg BID, not on Keppra anymore, she did not taper off Keppra but stopped suddenly per PCP. Therefor will resume Keppra from tonight but with taper schedule. Week 1: 250mg AM, 500mg PM  Week 2: 250mg BID  Week 3: 250mg daily  Week 4: 250mg every other day  Week 5: Off Keppra    Pt was advised to call me if she has more seizures or any questions. Pt voiced understanding. Carlyn Hernadez is prescribed and sent to pharmacy.

## 2020-10-30 NOTE — ED PROVIDER NOTES
I did not see or evaluate this patient.   They were assigned to me in error    Sandra Elias MD  Emergency Medicine Attending         Sandra Elias MD  10/29/20 2006

## 2020-10-30 NOTE — TELEPHONE ENCOUNTER
Dr Pepe Juan called to speak with you regarding patient. She said you could call her on Monday and that it was not an emergency. Her cell phone number is [phone]. Thank you!

## 2020-11-05 ENCOUNTER — HOSPITAL ENCOUNTER (OUTPATIENT)
Age: 35
Discharge: HOME | End: 2020-11-05
Payer: COMMERCIAL

## 2020-11-05 ENCOUNTER — OFFICE VISIT (OUTPATIENT)
Dept: NEUROLOGY | Age: 35
End: 2020-11-05
Payer: COMMERCIAL

## 2020-11-05 VITALS
HEIGHT: 62 IN | SYSTOLIC BLOOD PRESSURE: 137 MMHG | HEART RATE: 103 BPM | TEMPERATURE: 98.2 F | WEIGHT: 130.4 LBS | BODY MASS INDEX: 24 KG/M2 | DIASTOLIC BLOOD PRESSURE: 89 MMHG

## 2020-11-05 LAB
CARBAMAZEPINE DATE LAST DOSE: 11
CARBAMAZEPINE DOSE AMOUNT: NORMAL
CARBAMAZEPINE DOSE TIME: 1830
CARBAMAZEPINE LEVEL: 9 UG/ML (ref 4–12)

## 2020-11-05 PROCEDURE — 80156 ASSAY CARBAMAZEPINE TOTAL: CPT

## 2020-11-05 PROCEDURE — 99214 OFFICE O/P EST MOD 30 MIN: CPT | Performed by: PSYCHIATRY & NEUROLOGY

## 2020-11-05 PROCEDURE — 36415 COLL VENOUS BLD VENIPUNCTURE: CPT

## 2020-11-05 PROCEDURE — 80157 ASSAY CARBAMAZEPINE FREE: CPT

## 2020-11-05 RX ORDER — LANOLIN ALCOHOL/MO/W.PET/CERES
400 CREAM (GRAM) TOPICAL DAILY
Qty: 30 TABLET | Refills: 3 | Status: SHIPPED | OUTPATIENT
Start: 2020-11-05 | End: 2021-01-07 | Stop reason: SDUPTHER

## 2020-11-05 NOTE — PROGRESS NOTES
[facility]            [place], [address]; [place], [address]  [address], [address], [facility], [address]    [address] [facility], [address], [address], [address] [facility]            Dept: [phone]          Dept Fax: [phone]             MD Iza Wilkes MD Ahmed B. Cleophas Colt, MD Andriette Lingo, MD Hillary Fell, MD Hermine Borg, GEMINI               NEUROLOGY FOLLOW UP NOTE                                          PATIENT NAME: Lily Craig   PATIENT MRN: [id number]  FOLLOW UP TODAY: 11/5/2020     Dear Dr. Adilene Calvillo, APRN - CNP,     I had the pleasure of seeing your patient Lily Craig, who comes for follow up. CHIEF COMPLAINT: Seizures (follow up)     INITIAL & INTERVAL HISTORY:     Kennedi Rhodes a 37year [place] 44 WF, last seen on 9/24/2020, pt returns for follow up regarding HA, seizure.      Pt came with cristhian Perez (know each other 5 years) to clinic today. Since last visit, she has had three ED visits, for abdominal pain on 10/10; rash on 10/19; 2 seizures on 10/29. Last visit, started her on lamictal, therefore lamictal was discontinued on 10/19, Dr. Abi Walton was on call, started her on Keppra 500mg BID, she took for 2-3 days, on 10/22, PCP told her to stop. On 10/29, she went to [place] playing at the slot machine, smoking, then she felt funny, left eye twitching, she told her fiance that she did not feel well, something was not right, fiance grabbed her and gently laid her ont he ground, then left side body shook for about 1 minute, then left arm went flaccid afterward but she did not lose conscious, no tongue biting/incontinence. She had another one same as the first one. On 10/30, I advised her to restart Keppra but with weekly taper at a rate of 250mg until off.  Since then, she has brain is stable. No acute intracranial pathology.          PMH/PSH/SH/FMH: Remain unchanged since last visit except those listed in the interval history. Diagnosis Date    Headache     Meningitis spinal     Seizures (HCC)     Stroke (HCC)         ALLERGIES:   Allergies   Allergen Reactions    Bactrim [Sulfamethoxazole-Trimethoprim] Hives     hives       MEDICATIONS:   Current Outpatient Medications   Medication Sig Dispense Refill    amitriptyline (ELAVIL) 50 MG tablet TAKE 1.5 TABLETS BY MOUTH NIGHTLY 45 tablet 3    levETIRAcetam (KEPPRA) 250 MG tablet 250mg AM, 500mg PM for 1wk, 250mg BID for 1wk; 250mg QHS for 1wk, 250mg QOD for 1wk, then off. 45 tablet 0    carBAMazepine (TEGRETOL XR) 200 MG extended release tablet TAKE 2 TABLETS BY MOUTH 2 TIMES DAILY 120 tablet 5    Lactobacillus Acid-Pectin (ACIDOPHILUS/CITRUS PECTIN) TABS TAKE 1 TABLET/CAPLET BY MOUTH DAILY 90 tablet 3    ibuprofen (ADVIL;MOTRIN) 800 MG tablet Take 1 tablet by mouth 2 times daily as needed for Pain 30 tablet 0    rizatriptan (MAXALT) 5 MG tablet Take 1 tablet by mouth once as needed for Migraine May repeat in 2 hours if needed 30 tablet 3    magnesium oxide (MAG-OX) 400 MG tablet TAKE 1 TABLET BY MOUTH DAILY 90 tablet 1    albuterol sulfate  (90 Base) MCG/ACT inhaler INHALE 2 PUFFS INTO THE LUNGS 4 TIMES DAILY AS NEEDED FOR WHEEZING 54 g 5    cyclobenzaprine (FLEXERIL) 5 MG tablet Take 1 tablet by mouth 2 times daily as needed for Muscle spasms 60 tablet 2    polyethylene glycol (GLYCOLAX) 17 GM/SCOOP powder TAKE 17 GRAMS BY MOUTH DAILY      nicotine (NICODERM CQ) 14 MG/24HR PLACE 1 PATCH ONTO THE SKIN DAILY (Patient not taking: Reported on 9/24/2020) 42 patch 0     No current facility-administered medications for this visit.         LABS & TESTS:      Lab Results   Component Value Date    WBC 13.4 (H) 10/29/2020    HGB 14.0 10/29/2020    HCT 40.5 10/29/2020    MCV 85.1 10/29/2020     10/29/2020 fully extended in right arm and right hand (chronic)   Psych Normal affect        NEUROLOGICAL EXAMINATION:     Mental status    Awake, alert and oriented; no aphasia, no dysarthria      Cranial nerves    II - visual fields intact to confrontation                                                III, IV, VI - PERRLA, EOMI, no pupillary defect; no JAYLIN, no nystagmus, no ptosis   V - normal facial sensation                                                               VII - normal facial symmetry                                                             VIII - intact hearing                                                                             IX, X - symmetrical palate                                                                  XI - symmetrical shoulder shrug                                                       XII - midline tongue without atrophy or fasciculation      Motor function  No abnormal movements; tone and bulk okay  Muscle strength:   RUE: delta 5/5, biceps 5/5, triceps 5/5,  5/5  LUE: delta 5/5, biceps 5/5, triceps 4+/5,  5/5  RLE: hf 5/5, ke 5/5, kf 5/5, df 5/5, pf 5/5  LLE: hf 5-/5, ke 5-/5, kf 5-/5, df 4+/5, pf 4+/5     Coordination FNF no dysmetria, heel to shin okay, CARLY okay, negative Romberg      Reflex function 2+ on right, brisk on left. Negative Babinski      Gait                  Scissor gait, limp on left (chronic), Tandem deferred      Sensory function Intact to light touch/temp/pp/vibration in bilateral upper and lower extremities. Intact joint position sense, no extinction     ASSESSMENT:    1. HA with mild migraine features  2. Breakthrough seizures, could be due to sudden stopped Keppra and stress  3. Bran cysts s/p multiple surgeries  4. Smoking  5. Stress  6. Weight gain, continue watch, if gain more, may consider switch Elavil to other medications     PLANS:    1. Restart magnesium oxide, start vitamin B2, continue elavil at bedtime  2.  CBZ total or free level today, continue CBZ 400mg BID after blood drawn, continue keppra with taper  3. Seizure precaution  4. Educated on learning stress handling skills  5. Educated on quitting smoking and minimize stress    >50% of 25 minute face to face time spent counseling patient, multiple issues discussed, all questions answered.      RTC in 3-4 months      Darlyn Guerra MD, MS

## 2020-11-05 NOTE — PATIENT INSTRUCTIONS
1. Get Carbamazepine level now, then resume your home dose. 2. Continue keppra with tapering down as we discussed  3. Quit smoking  4. Avoid triggers. 5. Remain off Lamictal   6.  Magnesium oxide and vitamin B2 for prevention, maxalt for abortive treatment     Return in 3-4 months    Cindy Zapien MD, MS

## 2020-11-08 LAB
% FREE CARBAMAZEPINE: 21.8 % (ref 8–35)
CARBAMAZEPINE, FREE: 2.2 UG/ML (ref 1–3)
CARBAMAZEPINE, TOTAL: 10.1 UG/ML (ref 4–12)

## 2020-11-10 ENCOUNTER — TELEPHONE (OUTPATIENT)
Dept: NEUROLOGY | Age: 35
End: 2020-11-10

## 2020-11-10 NOTE — TELEPHONE ENCOUNTER
Chey Torres called in this afternoon. She works as a  at [employer]. She wonders if Dr. Monik Norwood would be willing to fill out [facility] paperwork for her for intermittent time off such as if she has a seizure or headache. She has not had us complete this before nor has she ever asked her family doctor to complete it. I told her I would ask Dr. Monik Norwood and we would call her back.

## 2020-11-12 ENCOUNTER — TELEPHONE (OUTPATIENT)
Dept: NEUROLOGY | Age: 35
End: 2020-11-12

## 2020-11-19 RX ORDER — ALBUTEROL SULFATE 90 UG/1
2 AEROSOL, METERED RESPIRATORY (INHALATION) 4 TIMES DAILY PRN
Qty: 54 G | Refills: 5 | Status: SHIPPED | OUTPATIENT
Start: 2020-11-19 | End: 2021-09-14

## 2020-11-19 NOTE — TELEPHONE ENCOUNTER
Please Approve or Refuse.   Send to Pharmacy per Pt's Request:      Next Visit Date:  11/25/2020   Last Visit Date: 8/28/2019    No results found for: LABA1C          ( goal A1C is < 7)   BP Readings from Last 3 Encounters:   11/05/20 137/89   10/29/20 (!) 119/94   10/22/20 110/78          (goal 120/80)  BUN   Date Value Ref Range Status   10/29/2020 10 6 - 20 mg/dL Final     CREATININE   Date Value Ref Range Status   10/29/2020 0.56 0.50 - 0.90 mg/dL Final     Potassium   Date Value Ref Range Status   10/29/2020 3.6 (L) 3.7 - 5.3 mmol/L Final

## 2020-11-23 ENCOUNTER — TELEPHONE (OUTPATIENT)
Dept: NEUROLOGY | Age: 35
End: 2020-11-23

## 2021-01-07 ENCOUNTER — OFFICE VISIT (OUTPATIENT)
Dept: NEUROLOGY | Age: 36
End: 2021-01-07
Payer: COMMERCIAL

## 2021-01-07 VITALS
SYSTOLIC BLOOD PRESSURE: 132 MMHG | HEART RATE: 108 BPM | WEIGHT: 138 LBS | BODY MASS INDEX: 25.4 KG/M2 | HEIGHT: 62 IN | DIASTOLIC BLOOD PRESSURE: 94 MMHG | TEMPERATURE: 98.2 F

## 2021-01-07 DIAGNOSIS — R51.9 HEADACHE DISORDER: Primary | ICD-10-CM

## 2021-01-07 DIAGNOSIS — R63.5 WEIGHT GAIN: ICD-10-CM

## 2021-01-07 DIAGNOSIS — K59.00 CONSTIPATION, UNSPECIFIED CONSTIPATION TYPE: ICD-10-CM

## 2021-01-07 DIAGNOSIS — G40.109 LOCALIZATION-RELATED EPILEPSY (HCC): ICD-10-CM

## 2021-01-07 PROCEDURE — 99214 OFFICE O/P EST MOD 30 MIN: CPT | Performed by: PSYCHIATRY & NEUROLOGY

## 2021-01-07 RX ORDER — TOPIRAMATE 25 MG/1
TABLET ORAL
Qty: 120 TABLET | Refills: 3 | Status: SHIPPED | OUTPATIENT
Start: 2021-01-07 | End: 2021-02-23 | Stop reason: SINTOL

## 2021-01-07 RX ORDER — MAGNESIUM OXIDE 400 MG/1
400 TABLET ORAL DAILY
Qty: 30 TABLET | Refills: 1 | Status: SHIPPED | OUTPATIENT
Start: 2021-01-07 | End: 2021-02-23

## 2021-01-07 RX ORDER — TOPIRAMATE 25 MG/1
25 TABLET ORAL 2 TIMES DAILY
Qty: 120 TABLET | Refills: 3 | Status: SHIPPED | OUTPATIENT
Start: 2021-01-07 | End: 2021-01-07

## 2021-01-07 NOTE — PROGRESS NOTES
[facility]            [facility], [address]; [place], [address]  [address] [facility] [address], [address], [place], [address]    [address] [facility], [address], [place], [address] [facility]            Dept: [phone]          Dept Fax: [phone]             MD Tanya Bernard, MD Kerline Mcgrath MD Moody Barnacle, MD Allison Pinzon, GEMINI               NEUROLOGY FOLLOW UP NOTE                                          PATIENT NAME: Shaye Petty   PATIENT MRN: [id number]  FOLLOW UP TODAY: 1/7/2021     Dear Dr. Molly Goodman, APRN - CNP,     I had the pleasure of seeing your patient Shaye Petty, who comes for follow up. CHIEF COMPLAINT: Seizures (Follow up)     INITIAL & INTERVAL HISTORY:     Hodan Han is a 38year [place] 44 WF, last seen on 11/5/2020, pt returns for follow up regarding HA, seizure.     Pt came alone to clinic today. Since last visit, no seizures, she has been on tegretol 400mg BID, compliant. But for the past 3 weeks, she has had daily headache, most time on the right parietal, lasted for 1 hour, after she took excedrin migraine, it would go away, it occurs daily, without warning sign, so she has been taking excedrin daily. She had two times of hitting her head, once at work, she lifted her head up, bumped something, no LOC, she had another time hiting her head at relative's house when she got up, hit her head to a dresser, again, no LOC, no dizziness in both events. HA has been since then. Sleep is good. She has had constipation, urination is fine. No depression. She has been off Topamax because she felt not helpful, however she has further gained 4 pounds. She would like to lose weight. She says she only eats once a day, does not feel hungry, still frequently goes to play in [place]. AS NEEDED FOR WHEEZING 54 g 5    magnesium oxide (MAG-OX) 400 (240 Mg) MG tablet Take 1 tablet by mouth daily Hold if loose stool 30 tablet 3    vitamin B-2 (RIBOFLAVIN) 100 MG TABS tablet Take 1 tablet by mouth daily 30 tablet 3    amitriptyline (ELAVIL) 50 MG tablet TAKE 1.5 TABLETS BY MOUTH NIGHTLY 45 tablet 3    carBAMazepine (TEGRETOL XR) 200 MG extended release tablet TAKE 2 TABLETS BY MOUTH 2 TIMES DAILY 120 tablet 5    Lactobacillus Acid-Pectin (ACIDOPHILUS/CITRUS PECTIN) TABS TAKE 1 TABLET/CAPLET BY MOUTH DAILY 90 tablet 3    rizatriptan (MAXALT) 5 MG tablet Take 1 tablet by mouth once as needed for Migraine May repeat in 2 hours if needed 30 tablet 3    cyclobenzaprine (FLEXERIL) 5 MG tablet Take 1 tablet by mouth 2 times daily as needed for Muscle spasms 60 tablet 2    levETIRAcetam (KEPPRA) 250 MG tablet 250mg AM, 500mg PM for 1wk, 250mg BID for 1wk; 250mg QHS for 1wk, 250mg QOD for 1wk, then off. (Patient not taking: Reported on 1/7/2021) 45 tablet 0    ibuprofen (ADVIL;MOTRIN) 800 MG tablet Take 1 tablet by mouth 2 times daily as needed for Pain (Patient not taking: Reported on 1/7/2021) 30 tablet 0    polyethylene glycol (GLYCOLAX) 17 GM/SCOOP powder TAKE 17 GRAMS BY MOUTH DAILY      magnesium oxide (MAG-OX) 400 MG tablet TAKE 1 TABLET BY MOUTH DAILY (Patient not taking: Reported on 1/7/2021) 90 tablet 1    nicotine (NICODERM CQ) 14 MG/24HR PLACE 1 PATCH ONTO THE SKIN DAILY (Patient not taking: Reported on 9/24/2020) 42 patch 0     No current facility-administered medications for this visit. LABS & TESTS:      Lab Results   Component Value Date    WBC 13.4 (H) 10/29/2020    HGB 14.0 10/29/2020    HCT 40.5 10/29/2020    MCV 85.1 10/29/2020     10/29/2020       REVIEW OF SYSTEMS:       All items selected indicate a positive finding. Those items not selected are negative.   Constitutional [] Weight loss/gain   [x] Fatigue  [] Fever/Chills   HEENT [] Hearing Loss  [] Visual Disturbance  [] Tinnitus  [] Eye pain   Respiratory [] Shortness of Breath  [] Cough  [x] Snoring   Cardiovascular [] Chest Pain  [] Palpitations  [x] Lightheaded   GI [x] Constipation  [] Diarrhea  [] Swallowing change  [] Nausea/vomiting    [] Urinary Frequency  [] Urinary Urgency   Musculoskeletal [x] Neck pain  [x] Back pain  [x] Muscle pain  [] Restless legs   Dermatologic [] Skin changes   Neurologic [] Memory loss/confusion  [x] Seizures  [x] Trouble walking or imbalance  [x] Dizziness  [] Sleep disturbance  [] Weakness  [] Numbness  [] Tremors  [] Speech Difficulty  [x] Headaches  [x] Light Sensitivity  [x] Sound Sensitivity   Endocrinology []Excessive thirst  []Excessive hunger   Psychiatric [] Anxiety/Depression  [] Hallucination   Allergy/immunology []Hives/environmental allergies   Hematologic/lymph [] Abnormal bleeding  [] Abnormal bruising     VITALS  BP (!) 127/90 (Site: Right Upper Arm, Position: Sitting, Cuff Size: Medium Adult)   Pulse 112   Temp 98.2 °F (36.8 °C)   Ht 5' 2\" (1.575 m)   Wt 138 lb (62.6 kg)   BMI 25.24 kg/m²       PHYSICAL EXAMINATION:       General Appearance:  Alert, cooperative, no signs of distress, appears stated age   Skin:  No rash or lesions   HEENT:  Normocephalic, conjunctiva/corneas clear; eyelids intact   Ears:  Normal external ear and canals   Nose: Nares normal, mucosa normal, no drainage    Throat: Lips and tongue normal; teeth normal;  gums normal   Neck: Supple, intact flexion, extension and rotation;   trachea midline;  no adenopathy;   thyroid: not enlarged;   no carotid pulse abnormality   Lungs:   Respirations unlabored   Heart: Regular rate and rhythm   Abdominal: BS present, soft, NT, ND   Extremities: No cyanosis, no edema   Psych Normal affect        NEUROLOGICAL EXAMINATION:     Mental status    Awake, alert and oriented; no aphasia, no dysarthria      Cranial nerves    II - visual fields intact to confrontation III, IV, VI  PERRLA, EOMI, no pupillary defect; no JAYLIN, no nystagmus, no ptosis   V - normal facial sensation                                                               VII - normal facial symmetry                                                             VIII - intact hearing                                                                             IX, X - symmetrical palate                                                                  XI - symmetrical shoulder shrug                                                       XII - midline tongue without atrophy or fasciculation      Motor function  No abnormal movements; tone and bulk okay  Muscle strength:   RUE: delta 5/5, biceps 5/5, triceps 5/5,  5/5  LUE: delta 5/5, biceps 5/5, triceps 5/5,  5-/5, difficulty in fine motor movement in left hand   RLE: hf 5/5, ke 5/5, kf 5/5, df 5/5, pf 5/5  LLE: hf 5/5, ke 5/5, kf 5/5, df 4+/5, pf 4+/5     Coordination FNF no dysmetria, heel to shin okay, CARLY okay, negative Romberg      Reflex function 2+ on right, brisk on left. Negative Babinski      Gait                  Scissor gait, limp on left (chronic), Tandem deferred      Sensory function Intact to light touch/temp/pp/vibration in bilateral upper and lower extremities. Intact joint position sense, no extinction     ASSESSMENT:    1. Daily HA for 3 weeks  2. Localization related epilepsy  3. History of head surgeries for brain cysts  4. Weight gain     PLANS:    1. Continue CBZ at 400mg BID  2. Start topamax at 25mg daily with slow titration to reach 50mg BID  3. Limit excedrin use to avoid medication overuse  4. Keep headache log, drink enough water; eat regularly and healthy  5. Seizure precaution   6. Start magnesium for HA and constipation, start vitamin B2 (from over the counter)        30 minute spent obtaining history, examining patient, counseling patient, discussing treatment plan. Multiple issues discussed, all questions answered. RTC in 6-8 weeks      Mirela Gould MD, MS

## 2021-01-07 NOTE — PATIENT INSTRUCTIONS
1. Continue carbamazepine at 400mg twice a day  2. Start topamax at 25mg  Week 1: 25mg every morning  Week 2: 25mg twice a day  Week 3: 50mg morning, 25mg evening  From week 4: 50mg twice a day  3. Drink a lot of water  4. Magnesium oxide 400mg daily, hold if loose stool; vitamin B2  5. Minimize excedrine migraine use to avoid medication overuse.    6. Seizure precaution     Return in 6-8 weeks    Omar Garcia MD, MS No

## 2021-02-23 ENCOUNTER — OFFICE VISIT (OUTPATIENT)
Dept: NEUROLOGY | Age: 36
End: 2021-02-23
Payer: COMMERCIAL

## 2021-02-23 VITALS
BODY MASS INDEX: 25.21 KG/M2 | HEIGHT: 62 IN | WEIGHT: 137 LBS | TEMPERATURE: 97.2 F | DIASTOLIC BLOOD PRESSURE: 84 MMHG | HEART RATE: 113 BPM | SYSTOLIC BLOOD PRESSURE: 128 MMHG

## 2021-02-23 DIAGNOSIS — Z98.890 HISTORY OF BRAIN SURGERY: ICD-10-CM

## 2021-02-23 DIAGNOSIS — K59.00 CONSTIPATION, UNSPECIFIED CONSTIPATION TYPE: ICD-10-CM

## 2021-02-23 DIAGNOSIS — G40.109 LOCALIZATION-RELATED EPILEPSY (HCC): ICD-10-CM

## 2021-02-23 DIAGNOSIS — R51.9 PERSISTENT HEADACHES: ICD-10-CM

## 2021-02-23 DIAGNOSIS — R63.5 WEIGHT GAIN: ICD-10-CM

## 2021-02-23 DIAGNOSIS — R51.9 HEADACHE DISORDER: Primary | ICD-10-CM

## 2021-02-23 PROCEDURE — 99214 OFFICE O/P EST MOD 30 MIN: CPT | Performed by: PSYCHIATRY & NEUROLOGY

## 2021-02-23 RX ORDER — RIZATRIPTAN BENZOATE 10 MG/1
TABLET ORAL
Qty: 30 TABLET | Refills: 3 | Status: SHIPPED | OUTPATIENT
Start: 2021-02-23 | End: 2021-06-21 | Stop reason: ALTCHOICE

## 2021-02-23 RX ORDER — MAGNESIUM OXIDE 400 MG/1
400 TABLET ORAL DAILY
Qty: 60 TABLET | Refills: 3 | Status: SHIPPED | OUTPATIENT
Start: 2021-02-23 | End: 2021-08-02 | Stop reason: SDUPTHER

## 2021-02-23 NOTE — PATIENT INSTRUCTIONS
1. MRI brain w/o  2. Maxalt at time headache starts, may repeat once in 2 hours  3. Magnesium oxide twice a day, hold if loose stool   4. May decrease elavil to 25mg at bedtime   5.  Quit smoking    Will inform you MRI finding, then decide follow up    Grazyna Altamirano MD, MS

## 2021-02-23 NOTE — PROGRESS NOTES
[facility] [place] [facility]            [facility], [address]; [facility], [address]  [address] [facility], [address], [place], [address]    [address] [facility], [address], [place], [address] [facility]            Dept: [phone]          Dept Fax: [phone]             MD Alesha Lozoya, MD Ahmed B. Adelia Severin, MD Kennedi Mckenna, MD Rosabel Skiff, MD Daralene Cowden, GEMINI               NEUROLOGY FOLLOW UP NOTE                                          PATIENT NAME: James Flores   PATIENT MRN: [id number]  FOLLOW UP TODAY: 2/23/2021     Dear Dr. Keeley Cartagena, APRN - CNP,     I had the pleasure of seeing your patient James Flores, who comes for follow up. CHIEF COMPLAINT: Seizures     INITIAL & INTERVAL HISTORY:     Hemanth chirinos 38year [place] 44 WF, last seen on 1/7/2021, pt returns for follow up regarding her HA and seizure.      Pt came with boyfriend to clinic today.      Since last visit, she has had no seizures, she has been on tegretol 400mg BID, compliant. Her last seizure was on 10/29/2020, prior to that, she had no seizures for more than 12 years. She had brain surgery for a huge cyst in 2006, 2009. On 9/24/2020, I started her on Lamictal with titration, she developed rash on 10/19, then lamictal was discontinued, she was started on keppra by on call neurologist Dr. Adelia Severin. On 10/22/2020, keppra was discontinued by PCP. On 10/29, she went to [employer] playing the slot machines, she had two seizures on that day, she was brought to ED. She still suffers from headache since Dec, located at right parietal, constant, sharp, 7/10, no photophobia/phonophobia/n/v, maxalt and Elavil helped. She does not take  excedrine daily anymore, rather, she takes once in 3 days. She does not take other over the counter medications.  She has had HA since childhood. Sleep is good with Elavil at bedtime. Mood is good, she has gained 18 lbs. Mood is good. She tried lamictal in the past, caused her rashes, also tried Topamax, also not helpful to her headache. AEDs currently  Carbamazepine 400mg BID (since 2006)     AEDs tried  Lamictal (rash)  Topamax (for headache only, not helpful at low dose, but pt did not titrate up as instructed)     Initial clinical visit 9/5/2018  HA  Onset: as a child  Aura/warning sgins: none  Features: sudden onset, mid frontal to right temporal, sharp, 8/10, 4 times a week, lasted for 3 days, photophobia/phonophobia/n but no vomiting, sometime left weak. Laying in dark, quiet place made better.   Risk factors: brain surgery, 3 in 2006, 2 in 2009, had shunt before, removed in 2009. No family history of HA. Stroke at age of 3 months old with residual left side weakness.   Social: lives with fiance, smoking, no drug/drinking. No working for 1 year because of migraine. Used to work at [employer]. Sleep poor, only few hours.   Psychiatric: no depression.   Other medical issues: seizure? Medications: Fioricet. excedrin migraine 2 times a week. Last time used last night.      NEUROLOGICAL TESTS  [facility] 11/1/2019  NAF, dysmorphic enlarged right lateral ventricule with multiple sub sail shin cyst unchanged.           [facility] 4/20/2017  Since the older study the intracranial shunt has been removed.  The right ventriculomegaly with encephalomalacia is, however, stable from 4/26/2007.  The remainder the brain is stable. No acute intracranial pathology.         PMH/PSH/SH/FMH: Remain unchanged since last visit except those listed in the interval history.        Diagnosis Date    Headache     Meningitis spinal     Seizures (HCC)     Stroke (HCC)         ALLERGIES:   Allergies   Allergen Reactions    Bactrim [Sulfamethoxazole-Trimethoprim] Hives     hives       MEDICATIONS:   Current Outpatient Medications   Medication Sig Dispense Refill  magnesium oxide (MAG-OX) 400 MG tablet Take 1 tablet by mouth daily 400mg daily, hold if loose stool. 30 tablet 1    topiramate (TOPAMAX) 25 MG tablet 25mg QAM for 1wk, 25mg BID for 1wk, 50mg AM, 25mg PM for 1wk, 50mg  tablet 3    albuterol sulfate  (90 Base) MCG/ACT inhaler INHALE 2 PUFFS INTO THE LUNGS 4 TIMES DAILY AS NEEDED FOR WHEEZING 54 g 5    vitamin B-2 (RIBOFLAVIN) 100 MG TABS tablet Take 1 tablet by mouth daily 30 tablet 3    amitriptyline (ELAVIL) 50 MG tablet TAKE 1.5 TABLETS BY MOUTH NIGHTLY 45 tablet 3    levETIRAcetam (KEPPRA) 250 MG tablet 250mg AM, 500mg PM for 1wk, 250mg BID for 1wk; 250mg QHS for 1wk, 250mg QOD for 1wk, then off. (Patient not taking: Reported on 1/7/2021) 45 tablet 0    carBAMazepine (TEGRETOL XR) 200 MG extended release tablet TAKE 2 TABLETS BY MOUTH 2 TIMES DAILY 120 tablet 5    Lactobacillus Acid-Pectin (ACIDOPHILUS/CITRUS PECTIN) TABS TAKE 1 TABLET/CAPLET BY MOUTH DAILY 90 tablet 3    ibuprofen (ADVIL;MOTRIN) 800 MG tablet Take 1 tablet by mouth 2 times daily as needed for Pain (Patient not taking: Reported on 1/7/2021) 30 tablet 0    rizatriptan (MAXALT) 5 MG tablet Take 1 tablet by mouth once as needed for Migraine May repeat in 2 hours if needed 30 tablet 3    polyethylene glycol (GLYCOLAX) 17 GM/SCOOP powder TAKE 17 GRAMS BY MOUTH DAILY      magnesium oxide (MAG-OX) 400 MG tablet TAKE 1 TABLET BY MOUTH DAILY (Patient not taking: Reported on 1/7/2021) 90 tablet 1    cyclobenzaprine (FLEXERIL) 5 MG tablet Take 1 tablet by mouth 2 times daily as needed for Muscle spasms 60 tablet 2    nicotine (NICODERM CQ) 14 MG/24HR PLACE 1 PATCH ONTO THE SKIN DAILY (Patient not taking: Reported on 9/24/2020) 42 patch 0     No current facility-administered medications for this visit.         LABS & TESTS:      Lab Results   Component Value Date    WBC 13.4 (H) 10/29/2020    HGB 14.0 10/29/2020    HCT 40.5 10/29/2020    MCV 85.1 10/29/2020     10/29/2020 REVIEW OF SYSTEMS:       All items selected indicate a positive finding. Those items not selected are negative.   Constitutional [] Weight loss/gain   [] Fatigue  [] Fever/Chills   HEENT [] Hearing Loss  [x] Visual Disturbance  [] Tinnitus  [] Eye pain   Respiratory [] Shortness of Breath  [] Cough  [] Snoring   Cardiovascular [] Chest Pain  [] Palpitations  [] Lightheaded   GI [] Constipation  [] Diarrhea  [] Swallowing change  [] Nausea/vomiting    [] Urinary Frequency  [] Urinary Urgency   Musculoskeletal [x] Neck pain  [] Back pain  [] Muscle pain  [] Restless legs   Dermatologic [] Skin changes   Neurologic [] Memory loss/confusion  [] Seizures  [] Trouble walking or imbalance  [] Dizziness  [] Sleep disturbance  [] Weakness  [] Numbness  [] Tremors  [] Speech Difficulty  [x] Headaches  [x] Light Sensitivity  [x] Sound Sensitivity   Endocrinology []Excessive thirst  []Excessive hunger   Psychiatric [] Anxiety/Depression  [] Hallucination   Allergy/immunology []Hives/environmental allergies   Hematologic/lymph [] Abnormal bleeding  [] Abnormal bruising     VITALS  /84 (Site: Right Upper Arm, Position: Sitting, Cuff Size: Medium Adult)   Pulse 113   Temp 97.2 °F (36.2 °C)   Ht 5' 2\" (1.575 m)   Wt 137 lb (62.1 kg)   BMI 25.06 kg/m²       PHYSICAL EXAMINATION:       General Appearance:  Alert, cooperative, no signs of distress, appears stated age   Skin:  No rash or lesions   HEENT:  Normocephalic, conjunctiva/corneas clear; eyelids intact   Ears:  Normal external ear and canals   Nose: Nares normal, mucosa normal, no drainage    Throat: Lips and tongue normal; teeth normal;  gums normal   Neck: Supple, intact flexion, extension and rotation;   trachea midline;  no adenopathy;   thyroid: not enlarged;   no carotid pulse abnormality   Lungs:   Respirations unlabored   Heart: Regular rate and rhythm   Abdominal: BS present, soft, NT, ND   Extremities: No cyanosis, no edema   Psych Normal affect NEUROLOGICAL EXAMINATION:     Mental status    Awake, alert and oriented; no aphasia, no dysarthria      Cranial nerves    II - visual fields intact to confrontation                                                III, IV, VI  PERRLA, EOMI, no pupillary defect; no JAYLIN, no nystagmus, no ptosis   V - normal facial sensation                                                               VII - normal facial symmetry                                                             VIII - intact hearing                                                                             IX, X - symmetrical palate                                                                  XI - symmetrical shoulder shrug                                                       XII - midline tongue without atrophy or fasciculation      Motor function  No abnormal movements; tone and bulk okay  Muscle strength:   RUE: delta 5/5, biceps 5/5, triceps 5/5,  5/5  LUE: delta 5/5, biceps 5/5, triceps 5/5,  5/5  RLE: hf 5/5, ke 5/5, kf 5/5, df 5/5, pf 5/5  LLE: hf 5/5, ke 5/5, kf 5/5, df 4+/5, pf 4+/5     Coordination FNF no dysmetria, heel to shin okay, CARLY okay, negative Romberg      Reflex function 2+ on right, brisk on left, negative Babinski      Gait                  Scissor gait, limp on left, Tandem deferred      Sensory function Intact to light touch/temp/pp/vibration in bilateral upper and lower extremities. Intact joint position sense, no extinction     ASSESSMENT:    1. Persistent HA  2. Localization related epilepsy  3. History of brain surgery  4. Weight gain   5. Smoking    PLANS:    1. MRI brain   2. Magnesium oxide 400mg BID  3. Decrease elavil to 25mg QHS for weight concern  4. Maxalt abortive treatment  5. Advised pt to stop casino at least for 1-2 months  6. Educated on smoke cessation  7.  Seizure precaution, HA log      RTC based on MRI finding       Carlene Cuellar MD, MS

## 2021-03-03 ENCOUNTER — TELEPHONE (OUTPATIENT)
Dept: NEUROLOGY | Age: 36
End: 2021-03-03

## 2021-03-03 NOTE — TELEPHONE ENCOUNTER
Chanda with [employer] called to let us know that the MRI was approved. It is good for one visit. Effective 3/1/2021 through 4/30/2021. Auth number is [id number]. I called pre-access and gave this information to [place].

## 2021-03-05 ENCOUNTER — HOSPITAL ENCOUNTER (OUTPATIENT)
Dept: MRI IMAGING | Age: 36
Discharge: HOME | End: 2021-03-07
Payer: COMMERCIAL

## 2021-03-05 DIAGNOSIS — R51.9 HEADACHE DISORDER: ICD-10-CM

## 2021-03-05 PROCEDURE — 70551 MRI BRAIN STEM W/O DYE: CPT

## 2021-03-06 DIAGNOSIS — R56.9 SEIZURE (HCC): Primary | ICD-10-CM

## 2021-03-10 ENCOUNTER — HOSPITAL ENCOUNTER (OUTPATIENT)
Dept: NEUROLOGY | Age: 36
Discharge: HOME | End: 2021-03-10
Payer: COMMERCIAL

## 2021-03-10 DIAGNOSIS — R56.9 SEIZURE (HCC): ICD-10-CM

## 2021-03-10 PROCEDURE — 95816 EEG AWAKE AND DROWSY: CPT

## 2021-03-10 PROCEDURE — 95816 EEG AWAKE AND DROWSY: CPT | Performed by: PSYCHIATRY & NEUROLOGY

## 2021-03-10 NOTE — PROCEDURES
[facility] [address]      ELECTROENCEPHALOGRAM REPORT        REFERRING PHYSICIAN:  Rajinder Veronica MD  PATIENT NAME:Charlotte Gan  PATIENT MRN: [id number]  DATE OF EEG: 3/10/2021    BRIEF HISTORY:  37 y/o female with huge brain cyst, seizure, was referred to EEG for evaluation. CURRENT ANTI-EPILEPTIC MEDICATIONS:  Tegretol 400mg PO twice a day     EEG DESCRIPTION:   During maximal wakefulness, the background was continuous in admixture of delta, theta, alpha and beta activity ranging between 10-50 uV. There was a posterior dominant rhythm at 10-11 Hz. Spontaneous variability was present. There was intermittent slow in right posterior head region with higher amplitude. There were sharply contoured waves in these regions. No epileptiform discharges or seizures were recorded. Stage II sleep were not present. Hyperventilation was not performed, photic stimulation did not activate abnormal activity. Single lead EKG detected regular heart rate ranging between 90s-100s per minute. CLASSIFICATION   Abnormal II (Awake, drowsy)  1. Intermittent slow, regional, right posterior head region   2. Asymmetry, higher amplitude, right parietal/occipital     IMPRESSION:   This was an abnormal routine awake and drowsy EEG, which showed structural abnormality in right posterior head region, consistent with patient's known right hemispheric cyst. No epileptiform discharges or EEG/clinical seizures were noted. Claudia Barber MD [facility]  Adult General Neurology Resident PGY-4  3/10/2021 at 5:48 PM     I reviewed and discussed with the resident of his/her note. I have personally reviewed the EEG, I agree with the final report which reflected my editing and modification.      Rajinder Veronica MD, [facility]  Neurology Attending  [facility], Neurology

## 2021-03-11 ENCOUNTER — TELEPHONE (OUTPATIENT)
Dept: NEUROLOGY | Age: 36
End: 2021-03-11

## 2021-03-11 ENCOUNTER — PATIENT MESSAGE (OUTPATIENT)
Dept: FAMILY MEDICINE CLINIC | Age: 36
End: 2021-03-11

## 2021-03-11 NOTE — TELEPHONE ENCOUNTER
Corby Love called about the EEG. She said that [facility] saw the results in my chart and is very worried about it, asking that it be explained to her. [facility] sent a my chart message to you this morning. Corby Love said that she sees changes in [facility] and feels there is something wrong in Charlotte's head. She feels that this is why she started having seizures again. She would to talk to you too. Please advise.

## 2021-03-11 NOTE — TELEPHONE ENCOUNTER
From: Cristofer Kwong  To: MIKE Fuentes CNP  Sent: 3/11/2021 8:40 AM EST  Subject: Test Results Question    What does the eeg results mean

## 2021-03-15 DIAGNOSIS — G40.909 SEIZURE DISORDER (HCC): ICD-10-CM

## 2021-03-16 RX ORDER — CARBAMAZEPINE 200 MG/1
TABLET, EXTENDED RELEASE ORAL
Qty: 120 TABLET | Refills: 5 | Status: SHIPPED | OUTPATIENT
Start: 2021-03-16 | End: 2021-09-21 | Stop reason: SDUPTHER

## 2021-03-24 DIAGNOSIS — G43.009 MIGRAINE WITHOUT AURA AND WITHOUT STATUS MIGRAINOSUS, NOT INTRACTABLE: ICD-10-CM

## 2021-03-25 RX ORDER — AMITRIPTYLINE HYDROCHLORIDE 50 MG/1
75 TABLET, FILM COATED ORAL NIGHTLY
Qty: 45 TABLET | Refills: 3 | Status: SHIPPED | OUTPATIENT
Start: 2021-03-25 | End: 2021-07-08 | Stop reason: SDUPTHER

## 2021-03-25 NOTE — TELEPHONE ENCOUNTER
Pharmacy requesting refill of amitriptyline (elavil) 50 mg       Medication active on med list yes      Date of last Rx: 10/30/2020   with 3 refills   verified on 3/25/2021   verified by JUAREZ GARCIA      Date of last appointment 02/23/2021    Next Visit Date:  There is no appointment to date

## 2021-03-30 ENCOUNTER — TELEMEDICINE (OUTPATIENT)
Dept: FAMILY MEDICINE CLINIC | Age: 36
End: 2021-03-30
Payer: COMMERCIAL

## 2021-03-30 DIAGNOSIS — H69.91 EUSTACHIAN TUBE DISORDER, RIGHT: Primary | ICD-10-CM

## 2021-03-30 DIAGNOSIS — Z86.73 H/O: STROKE: ICD-10-CM

## 2021-03-30 PROCEDURE — 99442 PR PHYS/QHP TELEPHONE EVALUATION 11-20 MIN: CPT | Performed by: FAMILY MEDICINE

## 2021-03-30 RX ORDER — LANOLIN ALCOHOL/MO/W.PET/CERES
1 CREAM (GRAM) TOPICAL DAILY
COMMUNITY
Start: 2021-02-23 | End: 2021-06-21 | Stop reason: SDUPTHER

## 2021-03-30 RX ORDER — FLUTICASONE PROPIONATE 50 MCG
1 SPRAY, SUSPENSION (ML) NASAL DAILY
Qty: 1 BOTTLE | Refills: 1 | Status: SHIPPED | OUTPATIENT
Start: 2021-03-30 | End: 2021-10-21

## 2021-03-30 RX ORDER — CETIRIZINE HYDROCHLORIDE 10 MG/1
10 TABLET ORAL DAILY
Qty: 30 TABLET | Refills: 0 | Status: SHIPPED | OUTPATIENT
Start: 2021-03-30 | End: 2022-03-31

## 2021-03-30 RX ORDER — TOPIRAMATE 25 MG/1
1 TABLET ORAL 2 TIMES DAILY
COMMUNITY
Start: 2021-03-25 | End: 2021-03-30 | Stop reason: ALTCHOICE

## 2021-03-30 ASSESSMENT — ENCOUNTER SYMPTOMS
SHORTNESS OF BREATH: 0
SORE THROAT: 0
RHINORRHEA: 0
SINUS PRESSURE: 0

## 2021-03-30 NOTE — PROGRESS NOTES
[] Abnormal -   Mental status: [x] Alert and awake  [x] Oriented to person/place/time[] Abnormal -   Pulmonary/Chest: [x] Respiratory effort normal         [] Abnormal -          Psychiatric:       [x] Normal Affect [] Abnormal -        [x] No Hallucinations  Other pertinent observable physical exam findings:-mildly anxious    ASSESSMENT  1. Eustachian tube disorder, right  Worsening  Continue with the same therapy   ADVISED TO:  Avoid known allergens/irritants. Stop smoking or avoid second hand smoke. Stay hydrated. Report for worsening symptoms    - cetirizine (ZYRTEC) 10 MG tablet; Take 1 tablet by mouth daily  Dispense: 30 tablet; Refill: 0  - fluticasone (FLONASE) 50 MCG/ACT nasal spray; 1 spray by Each Nostril route daily  Dispense: 1 Bottle; Refill: 1    2. H/O: stroke  Historical  Stable        I affirm this is a Patient Initiated Episode with a Patient who has not had a related appointment within my department in the past 7 days or scheduled within the next 24 hours. Patient identification was verified at the start of the visit: Yes    Total Time: minutes: 11-20 minutes    Note: not billable if this call serves to triage the patient into an appointment for the relevant concern  No flowsheet data found. Patient Active Problem List   Diagnosis    Neurologic gait disorder    Cerebrovascular accident (CVA) ([address]    Seizure disorder ([address]    Migraine without aura and without status migrainosus, not intractable    Foot drop, left foot    History of brain shunt    Cerebral ventriculomegaly    Encephalomalacia on imaging study    H/O: stroke    Chronic idiopathic constipation    Numbness and tingling in left arm    Neck pain    Seizure ([address]     Dustin Patel is a 38 y.o. female patient  being evaluated by a Virtual Visit (video visit) encounter to address concerns as mentioned above. A caregiver was present when appropriate.  Due to this being a TeleHealth encounter (During YDEGV-91 public health emergency), evaluation of the following organ systems was limited:Vitals/Constitutional/EENT/Resp/CV/GI//MS/Neuro/Skin/Heme-Lymph-Imm. Services were provided through a video synchronous discussion virtually to substitute for in-person clinic visit. This is a telehealth visit that was performed with the originating site at Patient Location: home and provider Location of [place], [place]. Pursuant to the emergency declaration under the [address] [place] [address], [address] [place] [address] authority and the [employer] Act, this Virtual Visit was conducted with patient's (and/or legal guardian's) consent, to reduce the patient's risk of exposure to COVID-19 and provide necessary medical care. The patient (and/or legal guardian) has also been advised to contact this office for worsening conditions or problems, and seek emergency medical treatment and/or call 911 if deemed necessary. This note was completed by using the assistance of a speech-recognition program. However, inadvertent computerized transcription errors may be present. Although every effort was made to ensure accuracy, no guarantees can be provided that every mistake has been identified and corrected by editing.   Electronically signed by MIKE Patterson CNP on 3/30/21 at 11:53 AM EDT

## 2021-03-30 NOTE — PATIENT INSTRUCTIONS
[facility] COVID-19 Vaccination Hotline: [phone]    COVID-19 vaccine appointments are not available through our practice. As you're eligible to receive the COVID-19 vaccine, as determined by your state's department of health, you will be able to schedule an appointment through [address] or by calling [phone]. Appointments are required to receive the COVID-19 vaccine. As vaccine supply continues to be limited, we anticipate open appointments to fill up quickly and appreciate your patience as we work through the process of providing vaccines to those in our communities. Schedule a Vaccine  When you qualify to receive the vaccine, call the [facility] COVID-19 Vaccination Hotline to schedule your appointment or to get additional information about the [facility] locations which are offering the COVID-19 vaccine. To be 94% effective, it's important that you receive two doses of one of the COVID-19 vaccines. -If you are receiving the [place] vaccine, your second shot will be scheduled as close to 21 days after the first shot as possible. -If you are receiving the Moderna vaccine, your second shot will be scheduled as close to 28 days after the first shot as possible. Links to [facility]) website and [facility] website:    Melissajslyhl/mercy-[facility]-monitoring-coronavirus-covid-19/covid-19-vaccine/[place]/madera-vaccine    [email or web address].Yumber/covidvaccine    [employer]  https://sites. google. com/view/wchd[place]-coronavirus/home/vaccines/get-vaccinated  [employer]. com/r/WoodCountyCOVID_Vaccine_On-Call_List      https://[employer]/shared/XIN1TMHXL?:toolbar=n&:display_count=n&:origin=viz_share_link&:embed=y    PHARMACY LOCATIONS  Https://vaccine. coronavirus. [place].gov/    [facility]  Https://[employer]/shared/ZNCDOWI5M?:toolbar=n&:display_count=n&:origin=viz_share_link&:embed=y    [facility] Https://Composeright/[employer]/[email or web address]?:toolbar=n&:display_count=n&:origin=WayConnected_share_link&:embed=y    [address]  Https://Composeright/[employer]/[email or web address]?:toolbar=n&:display_count=n&:origin=WayConnected_share_link&:embed=y    [address]  https://Composeright/shared/[email or web address]?:toolbar=n&:display_count=n&:origin=WayConnected_share_link&:embed=y    Patient Education        Eustachian Tube Problems: Care Instructions  Your Care Instructions     The eustachian (say \"you-STAY-shee-un\") tubes run between the inside of the ears and the throat. They keep air pressure stable in the ears. If your eustachian tubes become blocked, the air pressure in your ears changes. The fluids from a cold can clog eustachian tubes, causing pain in the ears. A quick change in air pressure can cause eustachian tubes to close up. This might happen when an airplane changes altitude or when a  goes up or down underwater. Eustachian tube problems often clear up on their own or after antibiotic treatment. If your tubes continue to be blocked, you may need surgery. Follow-up care is a key part of your treatment and safety. Be sure to make and go to all appointments, and call your doctor if you are having problems. It's also a good idea to know your test results and keep a list of the medicines you take. How can you care for yourself at home? · To ease ear pain, apply a warm washcloth or a heating pad set on low. There may be some drainage from the ear when the heat melts earwax. Put a cloth between the heat source and your skin. Do not use a heating pad with children. · If your doctor prescribed antibiotics, take them as directed. Do not stop taking them just because you feel better. You need to take the full course of antibiotics. · Your doctor may recommend over-the-counter medicine. Be safe with medicines. Oral or nasal decongestants may relieve ear pain.  Avoid decongestants that are combined with antihistamines, which tend to cause more blockage. But if allergies seem to be the problem, your doctor may recommend a combination. Be careful with cough and cold medicines. Don't give them to children younger than 6, because they don't work for children that age and can even be harmful. For children 6 and older, always follow all the instructions carefully. Make sure you know how much medicine to give and how long to use it. And use the dosing device if one is included. When should you call for help? Call your doctor now or seek immediate medical care if:    · You develop sudden, complete hearing loss.     · You have severe pain or feel dizzy.     · You have new or increasing pus or blood draining from your ear.     · You have redness, swelling, or pain around or behind the ear. Watch closely for changes in your health, and be sure to contact your doctor if:    · You do not get better after 2 weeks.     · You have any new symptoms, such as itching or a feeling of fullness in the ear. Where can you learn more? Go to [email or web address]. org and sign in to your [employer] account. Enter Y822 in the [employer] box to learn more about \"Eustachian Tube Problems: Care Instructions. \"     If you do not have an account, please click on the \"Sign Up Now\" link. Current as of: December 2, 2020               Content Version: 12.8  © 5057-4949 Healthwise, Incorporated. Care instructions adapted under license by [facility] ([facility]). If you have questions about a medical condition or this instruction, always ask your healthcare professional. [place] [address] any warranty or liability for your use of this information.

## 2021-04-27 ENCOUNTER — TELEPHONE (OUTPATIENT)
Dept: NEUROLOGY | Age: 36
End: 2021-04-27

## 2021-05-04 DIAGNOSIS — K59.04 CHRONIC IDIOPATHIC CONSTIPATION: ICD-10-CM

## 2021-05-04 RX ORDER — L. ACIDOPHILUS/PECTIN, CITRUS 25MM-100MG
TABLET ORAL
Qty: 90 TABLET | Refills: 3 | Status: SHIPPED | OUTPATIENT
Start: 2021-05-04 | End: 2021-09-14 | Stop reason: SDUPTHER

## 2021-05-21 ENCOUNTER — TELEPHONE (OUTPATIENT)
Dept: FAMILY MEDICINE CLINIC | Age: 36
End: 2021-05-21

## 2021-05-21 NOTE — TELEPHONE ENCOUNTER
Patient recently went to the ER on 5/19/2021 because she took a fall at work. The hospital recommended her to take ibuprofen 800 MG, but they did not send through a prescription for her and she does not have that medication on hand. She is requesting her PCP to possibly send that through for her to manage the pain. Please contact the patient as soon as possible if there are any problems, questions/concerns fulfilling this request. Thank you!

## 2021-06-18 NOTE — PROGRESS NOTES
Visit Information    Have you changed or started any medications since your last visit including any over-the-counter medicines, vitamins, or herbal medicines? no   Have you stopped taking any of your medications? Is so, why? -  no  Are you having any side effects from any of your medications? - no    Have you seen any other physician or provider since your last visit? yes -    Have you had any other diagnostic tests since your last visit? YES   Have you been seen in the emergency room and/or had an admission in a hospital since we last saw you?  yes -    Have you had your routine dental cleaning in the past 6 months?  no     Do you have an active MyChart account? If no, what is the barrier?   Yes    Patient Care Team:  MIKE Mason CNP as PCP - General  MIKE Mason CNP as PCP - [facility]  Kyra Johnson MD as Consulting Physician (Neurology)    Medical History Review  Past Medical, Family, and Social History reviewed and does contribute to the patient presenting condition    Health Maintenance   Topic Date Due    Hepatitis C screen  Never done    Varicella vaccine (1 of 2 - 2-dose childhood series) Never done    Pneumococcal 0-64 years Vaccine (1 of 2 - PPSV23) Never done    COVID-19 Vaccine (1) Never done    Cervical cancer screen  Never done    Annual Wellness Visit (AWV)  Never done    Flu vaccine (Season Ended) 09/01/2021    DTaP/Tdap/Td vaccine (2 - Td or Tdap) 04/05/2022    HIV screen  Completed    Hepatitis A vaccine  Aged Out    Hepatitis B vaccine  Aged Out    Hib vaccine  Aged Out    Meningococcal (ACWY) vaccine  Aged Out

## 2021-06-21 ENCOUNTER — OFFICE VISIT (OUTPATIENT)
Dept: FAMILY MEDICINE CLINIC | Age: 36
End: 2021-06-21
Payer: COMMERCIAL

## 2021-06-21 VITALS
TEMPERATURE: 97.6 F | WEIGHT: 140.4 LBS | SYSTOLIC BLOOD PRESSURE: 126 MMHG | HEIGHT: 62 IN | OXYGEN SATURATION: 97 % | DIASTOLIC BLOOD PRESSURE: 74 MMHG | HEART RATE: 104 BPM | BODY MASS INDEX: 25.83 KG/M2

## 2021-06-21 DIAGNOSIS — R42 VERTIGO: Primary | ICD-10-CM

## 2021-06-21 PROCEDURE — 99213 OFFICE O/P EST LOW 20 MIN: CPT | Performed by: NURSE PRACTITIONER

## 2021-06-21 RX ORDER — TOPIRAMATE 25 MG/1
TABLET ORAL
COMMUNITY
Start: 2021-05-20 | End: 2021-09-14

## 2021-06-21 RX ORDER — MECLIZINE HCL 12.5 MG/1
12.5 TABLET ORAL 3 TIMES DAILY PRN
Qty: 15 TABLET | Refills: 1 | Status: SHIPPED | OUTPATIENT
Start: 2021-06-21 | End: 2021-06-29

## 2021-06-21 ASSESSMENT — PATIENT HEALTH QUESTIONNAIRE - PHQ9
SUM OF ALL RESPONSES TO PHQ QUESTIONS 1-9: 0
1. LITTLE INTEREST OR PLEASURE IN DOING THINGS: 0
SUM OF ALL RESPONSES TO PHQ QUESTIONS 1-9: 0
SUM OF ALL RESPONSES TO PHQ9 QUESTIONS 1 & 2: 0
2. FEELING DOWN, DEPRESSED OR HOPELESS: 0
SUM OF ALL RESPONSES TO PHQ QUESTIONS 1-9: 0

## 2021-06-21 ASSESSMENT — ENCOUNTER SYMPTOMS
SHORTNESS OF BREATH: 0
RHINORRHEA: 0
SINUS PRESSURE: 0
SORE THROAT: 0

## 2021-06-21 NOTE — PROGRESS NOTES
[address]  [facility] 2.  [address] [address]  Dept: [phone]  Dept Fax: [phone]    Cholo Antoine is a 38 y.o. female who presents today for her medical conditions/complaintsas noted below. Cholo Antoine is c/o of   Chief Complaint   Patient presents with    Dizziness    Fall    Foot Pain     LEFT/ PAIN SCORE 5/10    Immunizations     NO TO COVID-19 VACCINE         HPI:     HPI  [place] is here re fall at work, tripping over an item that was placed on floor behind her. This is a workman's comp case and she states that it has resolved. Has been having symptoms of dizziness. Denies any syncopal episodes. Typically occurs if she has been standing for a period of time and when she lies down in bed. The spinning last for several moments and she never feels as though she is going to pass out. She was seen in March for eustachian tube dysfunction and states she has been using the flonase.    No results found for: LABA1C          ( goal A1Cis < 7)   No results found for: LABMICR  LDL Cholesterol (mg/dL)   Date Value   06/25/2018 131 (H)       (goal LDL is <100)   AST (U/L)   Date Value   10/29/2020 21     ALT (U/L)   Date Value   10/29/2020 20     BUN (mg/dL)   Date Value   10/29/2020 10     BP Readings from Last 3 Encounters:   06/21/21 126/74   02/23/21 128/84   01/07/21 (!) 132/94          (goal 120/80)    Past Medical History:   Diagnosis Date    Headache     Meningitis spinal     Seizures (HCC)     Stroke [facility])       Past Surgical History:   Procedure Laterality Date    BRAIN SURGERY      cyst and fluid build up    SHUNT REMOVAL         Family History   Problem Relation Age of Onset    Migraines Mother     Heart Disease Mother     Heart Attack Father     Heart Disease Father     High Blood Pressure Maternal Grandmother     Cancer Maternal Grandfather        Social History     Tobacco Use    Smoking status: Light Tobacco Smoker Packs/day: 0.25    Smokeless tobacco: Never Used   Substance Use Topics    Alcohol use: No      Current Outpatient Medications   Medication Sig Dispense Refill    topiramate (TOPAMAX) 25 MG tablet TAKE 1 TAB EVERY MORNING X1 WK, THEN 1 TABLET TWICE DAILY X1 WK, THEN 2 EVERY MORNING & 1 EVERY EVENING X 1 WK, THEN 2 TABS TWICE DAILY THER      meclizine (ANTIVERT) 12.5 MG tablet Take 1 tablet by mouth 3 times daily as needed for Dizziness 15 tablet 1    Lactobacillus Acid-Pectin (ACIDOPHILUS/CITRUS PECTIN) TABS TAKE 1 TABLET/CAPLET BY MOUTH DAILY 90 tablet 3    amitriptyline (ELAVIL) 50 MG tablet TAKE 1.5 TABLETS BY MOUTH NIGHTLY 45 tablet 3    carBAMazepine (TEGRETOL XR) 200 MG extended release tablet TAKE 2 TABLETS BY MOUTH 2 TIMES DAILY 120 tablet 5    Rimegepant Sulfate (NURTEC) 75 MG TBDP 75mg at time of headache, may repeat after 2 hours, no more than 2 pills a day as needed 30 tablet 0    magnesium oxide (MAG-OX) 400 MG tablet Take 1 tablet by mouth daily 400mg daily or twice a day, hold if loose stool. 60 tablet 3    albuterol sulfate  (90 Base) MCG/ACT inhaler INHALE 2 PUFFS INTO THE LUNGS 4 TIMES DAILY AS NEEDED FOR WHEEZING 54 g 5    vitamin B-2 (RIBOFLAVIN) 100 MG TABS tablet Take 1 tablet by mouth daily 30 tablet 3    levETIRAcetam (KEPPRA) 250 MG tablet 250mg AM, 500mg PM for 1wk, 250mg BID for 1wk; 250mg QHS for 1wk, 250mg QOD for 1wk, then off. 45 tablet 0    ibuprofen (ADVIL;MOTRIN) 800 MG tablet Take 1 tablet by mouth 2 times daily as needed for Pain 30 tablet 0    polyethylene glycol (GLYCOLAX) 17 GM/SCOOP powder TAKE 17 GRAMS BY MOUTH DAILY      cyclobenzaprine (FLEXERIL) 5 MG tablet Take 1 tablet by mouth 2 times daily as needed for Muscle spasms 60 tablet 2    fluticasone (FLONASE) 50 MCG/ACT nasal spray 1 spray by Each Nostril route daily 1 Bottle 1     No current facility-administered medications for this visit.      Allergies   Allergen Reactions    Bactrim [Sulfamethoxazole-Trimethoprim] Hives     [facility]       Health Maintenance   Topic Date Due    Varicella vaccine (1 of 2 - 2-dose childhood series) Never done    Pneumococcal 0-64 years Vaccine (1 of 2 - PPSV23) Never done    COVID-19 Vaccine (1) Never done    Cervical cancer screen  Never done    Annual Wellness Visit (AWV)  Never done    Hepatitis C screen  06/21/2022 (Originally 1985)    Flu vaccine (Season Ended) 09/01/2021    DTaP/Tdap/Td vaccine (2 - Td or Tdap) 04/05/2022    HIV screen  Completed    Hepatitis A vaccine  Aged Out    Hepatitis B vaccine  Aged Out    Hib vaccine  Aged Out    Meningococcal (ACWY) vaccine  Aged Out       Subjective:     Review of Systems   Constitutional: Negative for chills, fatigue and fever. HENT: Negative for congestion, ear pain, hearing loss, postnasal drip, rhinorrhea, sinus pressure and sore throat. Respiratory: Negative for shortness of breath. Cardiovascular: Negative for chest pain. Allergic/Immunologic: Negative for environmental allergies. Neurological: Positive for dizziness and seizures. Negative for syncope and headaches. Hematological: Negative for adenopathy. Does not bruise/bleed easily. Psychiatric/Behavioral: Negative for sleep disturbance and suicidal ideas. The patient is not nervous/anxious. Objective:     Physical Exam  Constitutional:       Appearance: She is well-developed. HENT:      Head: Normocephalic. Right Ear: Tympanic membrane is retracted. Left Ear: Tympanic membrane normal.   Eyes:      Conjunctiva/sclera: Conjunctivae normal.   Cardiovascular:      Rate and Rhythm: Normal rate and regular rhythm. Heart sounds: Normal heart sounds. Pulmonary:      Effort: Pulmonary effort is normal.      Breath sounds: Normal breath sounds. Musculoskeletal:      Cervical back: Normal range of motion. Skin:     General: Skin is warm and dry.    Neurological:      Mental Status: She is alert and oriented to person, place, and time. Psychiatric:         Behavior: Behavior normal.         Thought Content: Thought content normal.         Judgment: Judgment normal.       /74 (Site: Left Upper Arm)   Pulse 104   Temp 97.6 °F (36.4 °C)   Ht 5' 2\" (1.575 m)   Wt 140 lb 6.4 oz (63.7 kg)   SpO2 97%   BMI 25.68 kg/m²     Assessment:       Diagnosis Orders   1. Vertigo  meclizine (ANTIVERT) 12.5 MG tablet             Plan:      Return if symptoms worsen or fail to improve. No orders of the defined types were placed in this encounter. Orders Placed This Encounter   Medications    meclizine (ANTIVERT) 12.5 MG tablet     Sig: Take 1 tablet by mouth 3 times daily as needed for Dizziness     Dispense:  15 tablet     Refill:  1    1. Vertigo  Continue use of flonase    - meclizine (ANTIVERT) 12.5 MG tablet; Take 1 tablet by mouth 3 times daily as needed for Dizziness  Dispense: 15 tablet; Refill: 1    Seizure disorder-was seeing Dr. Mortimer Bidding who has left [address] [place] [address], she is not certain who she will see in her place. Patient given educational materials - see patient instructions. Discussed use, benefit, and side effects of prescribed medications. All patientquestions answered. Pt voiced understanding. Reviewed health maintenance. Instructedto continue current medications, diet and exercise. Patient agreed with treatmentplan. Follow up as directed.      Electronically signed by MIKE Beck CNP on 6/21/2021 at 5:52 PM

## 2021-06-28 ENCOUNTER — PATIENT MESSAGE (OUTPATIENT)
Dept: FAMILY MEDICINE CLINIC | Age: 36
End: 2021-06-28

## 2021-06-28 NOTE — TELEPHONE ENCOUNTER
From: Cholo Antoine  To: MIKE Oquendo CNP  Sent: 6/28/2021 11:18 AM EDT  Subject: Visit Follow-Up Question    Should i keep my july first appointment i was just there last week still getting dizzy

## 2021-06-29 DIAGNOSIS — R42 VERTIGO: ICD-10-CM

## 2021-06-29 RX ORDER — MECLIZINE HCL 12.5 MG/1
12.5 TABLET ORAL 3 TIMES DAILY PRN
Qty: 15 TABLET | Refills: 1 | Status: SHIPPED | OUTPATIENT
Start: 2021-06-29 | End: 2021-07-12

## 2021-06-29 NOTE — TELEPHONE ENCOUNTER
Please Approve or Refuse.   Send to Pharmacy per Pt's Request:      Next Visit Date:  7/1/2021   Last Visit Date: 6/21/2021    No results found for: LABA1C          ( goal A1C is < 7)   BP Readings from Last 3 Encounters:   06/21/21 126/74   02/23/21 128/84   01/07/21 (!) 132/94          (goal 120/80)  BUN   Date Value Ref Range Status   10/29/2020 10 6 - 20 mg/dL Final     CREATININE   Date Value Ref Range Status   10/29/2020 0.56 0.50 - 0.90 mg/dL Final     Potassium   Date Value Ref Range Status   10/29/2020 3.6 (L) 3.7 - 5.3 mmol/L Final

## 2021-07-07 ENCOUNTER — TELEPHONE (OUTPATIENT)
Dept: FAMILY MEDICINE CLINIC | Age: 36
End: 2021-07-07

## 2021-07-07 ENCOUNTER — E-VISIT (OUTPATIENT)
Dept: FAMILY MEDICINE CLINIC | Age: 36
End: 2021-07-07
Payer: COMMERCIAL

## 2021-07-07 DIAGNOSIS — H60.509 ACUTE OTITIS EXTERNA, UNSPECIFIED LATERALITY, UNSPECIFIED TYPE: Primary | ICD-10-CM

## 2021-07-07 PROCEDURE — 98970 NQHP OL DIG ASSMT&MGMT 5-10: CPT | Performed by: NURSE PRACTITIONER

## 2021-07-07 RX ORDER — CIPROFLOXACIN/HYDROCORTISONE 0.2 %-1 %
3 SUSPENSION, DROPS(FINAL DOSAGE FORM)(ML) OTIC (EAR) 2 TIMES DAILY
Qty: 1 BOTTLE | Refills: 0 | Status: SHIPPED | OUTPATIENT
Start: 2021-07-07 | End: 2022-03-31

## 2021-07-07 ASSESSMENT — LIFESTYLE VARIABLES
SMOKING_STATUS: YES
SMOKING_YEARS: 20

## 2021-07-07 NOTE — TELEPHONE ENCOUNTER
Ciprodex is covered by insurance but the cipro-hydrocortisone isn't pharmcay called wondering if they can change the Rx to ciprodex

## 2021-07-08 ENCOUNTER — PATIENT MESSAGE (OUTPATIENT)
Dept: FAMILY MEDICINE CLINIC | Age: 36
End: 2021-07-08

## 2021-07-08 DIAGNOSIS — G43.009 MIGRAINE WITHOUT AURA AND WITHOUT STATUS MIGRAINOSUS, NOT INTRACTABLE: ICD-10-CM

## 2021-07-08 RX ORDER — AMITRIPTYLINE HYDROCHLORIDE 50 MG/1
75 TABLET, FILM COATED ORAL NIGHTLY
Qty: 45 TABLET | Refills: 3 | Status: SHIPPED | OUTPATIENT
Start: 2021-07-08 | End: 2021-09-14 | Stop reason: SDUPTHER

## 2021-07-08 NOTE — TELEPHONE ENCOUNTER
From: Julianna Tay  To: [place] Rodríguez APRN - CNP  Sent: 7/8/2021 2:36 PM EDT  Subject: Visit Follow-Up Question    Is there any way u can fax a new script of amitriptyine 50mg to my pharmacy

## 2021-07-09 ENCOUNTER — PATIENT MESSAGE (OUTPATIENT)
Dept: FAMILY MEDICINE CLINIC | Age: 36
End: 2021-07-09

## 2021-07-09 DIAGNOSIS — R42 VERTIGO: Primary | ICD-10-CM

## 2021-07-09 DIAGNOSIS — R42 VERTIGO: ICD-10-CM

## 2021-07-09 NOTE — TELEPHONE ENCOUNTER
From: Pearle Schwab  To: MIKE Morales - CNP  Sent: 7/9/2021 11:53 AM EDT  Subject: Non-Urgent Medical Question    What causes my ear drum to be like flat

## 2021-07-12 RX ORDER — MECLIZINE HCL 12.5 MG/1
12.5 TABLET ORAL 3 TIMES DAILY PRN
Qty: 15 TABLET | Refills: 1 | Status: SHIPPED | OUTPATIENT
Start: 2021-07-12 | End: 2022-03-31

## 2021-08-02 DIAGNOSIS — K59.00 CONSTIPATION, UNSPECIFIED CONSTIPATION TYPE: ICD-10-CM

## 2021-08-02 DIAGNOSIS — R51.9 HEADACHE DISORDER: ICD-10-CM

## 2021-08-02 NOTE — TELEPHONE ENCOUNTER
Patient requesting refill of magnesium, Vit. B12.       Medication active on med list yes      Date of last fill: Magnesium 2/23/21  with 3 refills and Vitamin B12 11/5/2020 with 3 refills verified on 8/2/21  verified by DA RN      Date of last appointment 2/23/21 with Dr. Parris Gilmore    Next Visit Date: 8/12/21 with Dr. Audrey Whitlock

## 2021-08-03 RX ORDER — MAGNESIUM OXIDE 400 MG/1
400 TABLET ORAL DAILY
Qty: 60 TABLET | Refills: 0 | Status: SHIPPED | OUTPATIENT
Start: 2021-08-03 | End: 2021-09-14 | Stop reason: SDUPTHER

## 2021-08-04 ENCOUNTER — HOSPITAL ENCOUNTER (OUTPATIENT)
Dept: CT IMAGING | Age: 36
Discharge: HOME | End: 2021-08-06
Payer: COMMERCIAL

## 2021-08-04 DIAGNOSIS — H93.19 TINNITUS, UNSPECIFIED LATERALITY: ICD-10-CM

## 2021-08-04 PROCEDURE — 2580000003 HC RX 258: Performed by: OTOLARYNGOLOGY

## 2021-08-04 PROCEDURE — 70491 CT SOFT TISSUE NECK W/DYE: CPT

## 2021-08-04 PROCEDURE — 6360000004 HC RX CONTRAST MEDICATION: Performed by: OTOLARYNGOLOGY

## 2021-08-04 RX ORDER — 0.9 % SODIUM CHLORIDE 0.9 %
80 INTRAVENOUS SOLUTION INTRAVENOUS ONCE
Status: COMPLETED | OUTPATIENT
Start: 2021-08-04 | End: 2021-08-04

## 2021-08-04 RX ORDER — SODIUM CHLORIDE 0.9 % (FLUSH) 0.9 %
10 SYRINGE (ML) INJECTION PRN
Status: DISCONTINUED | OUTPATIENT
Start: 2021-08-04 | End: 2021-08-07 | Stop reason: HOSPADM

## 2021-08-04 RX ADMIN — IOPAMIDOL 75 ML: 755 INJECTION, SOLUTION INTRAVENOUS at 16:35

## 2021-08-04 RX ADMIN — SODIUM CHLORIDE 80 ML: 9 INJECTION, SOLUTION INTRAVENOUS at 16:36

## 2021-08-04 RX ADMIN — SODIUM CHLORIDE, PRESERVATIVE FREE 10 ML: 5 INJECTION INTRAVENOUS at 16:36

## 2021-09-16 PROBLEM — R51.9 HEADACHE DISORDER: Status: ACTIVE | Noted: 2021-09-16

## 2021-09-16 PROBLEM — K59.00 CONSTIPATION: Status: ACTIVE | Noted: 2021-09-16

## 2021-09-21 DIAGNOSIS — G40.909 SEIZURE DISORDER (HCC): ICD-10-CM

## 2021-09-22 RX ORDER — CARBAMAZEPINE 200 MG/1
TABLET, EXTENDED RELEASE ORAL
Qty: 120 TABLET | Refills: 1 | Status: SHIPPED | OUTPATIENT
Start: 2021-09-22 | End: 2021-11-17

## 2021-09-30 ENCOUNTER — HOSPITAL ENCOUNTER (OUTPATIENT)
Dept: GENERAL RADIOLOGY | Age: 36
Discharge: HOME | End: 2021-10-02
Payer: COMMERCIAL

## 2021-09-30 ENCOUNTER — HOSPITAL ENCOUNTER (OUTPATIENT)
Age: 36
Discharge: HOME | End: 2021-10-02
Payer: COMMERCIAL

## 2021-09-30 DIAGNOSIS — M54.50 ACUTE LEFT-SIDED LOW BACK PAIN WITHOUT SCIATICA: ICD-10-CM

## 2021-09-30 DIAGNOSIS — M25.552 ACUTE HIP PAIN, LEFT: ICD-10-CM

## 2021-09-30 PROCEDURE — 72100 X-RAY EXAM L-S SPINE 2/3 VWS: CPT

## 2021-09-30 PROCEDURE — 73502 X-RAY EXAM HIP UNI 2-3 VIEWS: CPT

## 2021-10-04 PROBLEM — M54.50 ACUTE LEFT-SIDED LOW BACK PAIN WITHOUT SCIATICA: Status: ACTIVE | Noted: 2021-10-04

## 2021-10-04 PROBLEM — M25.552 ACUTE HIP PAIN, LEFT: Status: ACTIVE | Noted: 2021-10-04

## 2021-10-04 PROBLEM — R10.9 ABDOMINAL PAIN: Status: ACTIVE | Noted: 2021-10-04

## 2021-10-07 PROBLEM — M47.816 ARTHRITIS OF LUMBAR SPINE: Status: ACTIVE | Noted: 2021-10-07

## 2021-10-07 PROBLEM — K59.01 SLOW TRANSIT CONSTIPATION: Status: ACTIVE | Noted: 2021-09-16

## 2021-10-21 ENCOUNTER — HOSPITAL ENCOUNTER (OUTPATIENT)
Age: 36
Discharge: HOME | End: 2021-10-21
Payer: COMMERCIAL

## 2021-10-21 LAB
ABSOLUTE EOS #: 0.14 K/UL (ref 0–0.44)
ABSOLUTE IMMATURE GRANULOCYTE: 0.04 K/UL (ref 0–0.3)
ABSOLUTE LYMPH #: 1.8 K/UL (ref 1.1–3.7)
ABSOLUTE MONO #: 0.57 K/UL (ref 0.1–1.2)
ALBUMIN SERPL-MCNC: 4.4 G/DL (ref 3.5–5.2)
ALBUMIN/GLOBULIN RATIO: 1.6 (ref 1–2.5)
ALP BLD-CCNC: 138 U/L (ref 35–104)
ALT SERPL-CCNC: 41 U/L (ref 5–33)
ANION GAP SERPL CALCULATED.3IONS-SCNC: 13 MMOL/L (ref 9–17)
AST SERPL-CCNC: 38 U/L
BASOPHILS # BLD: 1 % (ref 0–2)
BASOPHILS ABSOLUTE: 0.03 K/UL (ref 0–0.2)
BILIRUB SERPL-MCNC: 0.32 MG/DL (ref 0.3–1.2)
BUN BLDV-MCNC: 17 MG/DL (ref 6–20)
BUN/CREAT BLD: ABNORMAL (ref 9–20)
CALCIUM SERPL-MCNC: 8.3 MG/DL (ref 8.6–10.4)
CHLORIDE BLD-SCNC: 102 MMOL/L (ref 98–107)
CO2: 22 MMOL/L (ref 20–31)
CREAT SERPL-MCNC: 0.64 MG/DL (ref 0.5–0.9)
DIFFERENTIAL TYPE: ABNORMAL
EOSINOPHILS RELATIVE PERCENT: 3 % (ref 1–4)
GFR AFRICAN AMERICAN: >60 ML/MIN
GFR NON-AFRICAN AMERICAN: >60 ML/MIN
GFR SERPL CREATININE-BSD FRML MDRD: ABNORMAL ML/MIN/{1.73_M2}
GFR SERPL CREATININE-BSD FRML MDRD: ABNORMAL ML/MIN/{1.73_M2}
GLUCOSE BLD-MCNC: 82 MG/DL (ref 70–99)
HCT VFR BLD CALC: 42.7 % (ref 36.3–47.1)
HEMOGLOBIN: 14.5 G/DL (ref 11.9–15.1)
IMMATURE GRANULOCYTES: 1 %
LYMPHOCYTES # BLD: 32 % (ref 24–43)
MCH RBC QN AUTO: 31.3 PG (ref 25.2–33.5)
MCHC RBC AUTO-ENTMCNC: 34 G/DL (ref 28.4–34.8)
MCV RBC AUTO: 92.2 FL (ref 82.6–102.9)
MONOCYTES # BLD: 10 % (ref 3–12)
NRBC AUTOMATED: 0 PER 100 WBC
PDW BLD-RTO: 12.5 % (ref 11.8–14.4)
PLATELET # BLD: 220 K/UL (ref 138–453)
PLATELET ESTIMATE: ABNORMAL
PMV BLD AUTO: 10 FL (ref 8.1–13.5)
POTASSIUM SERPL-SCNC: 4.4 MMOL/L (ref 3.7–5.3)
RBC # BLD: 4.63 M/UL (ref 3.95–5.11)
RBC # BLD: ABNORMAL 10*6/UL
SEG NEUTROPHILS: 53 % (ref 36–65)
SEGMENTED NEUTROPHILS ABSOLUTE COUNT: 3.11 K/UL (ref 1.5–8.1)
SODIUM BLD-SCNC: 137 MMOL/L (ref 135–144)
TOTAL PROTEIN: 7.1 G/DL (ref 6.4–8.3)
WBC # BLD: 5.7 K/UL (ref 3.5–11.3)
WBC # BLD: ABNORMAL 10*3/UL

## 2021-10-21 PROCEDURE — 85025 COMPLETE CBC W/AUTO DIFF WBC: CPT

## 2021-10-21 PROCEDURE — 80053 COMPREHEN METABOLIC PANEL: CPT

## 2021-10-21 PROCEDURE — 36415 COLL VENOUS BLD VENIPUNCTURE: CPT

## 2021-10-21 PROCEDURE — 80156 ASSAY CARBAMAZEPINE TOTAL: CPT

## 2021-10-21 PROCEDURE — 80157 ASSAY CARBAMAZEPINE FREE: CPT

## 2021-10-24 LAB
% FREE CARBAMAZEPINE: 21.2 % (ref 8–35)
CARBAMAZEPINE, FREE: 1.8 UG/ML (ref 1–3)
CARBAMAZEPINE, TOTAL: 8.5 UG/ML (ref 4–12)

## 2021-11-16 DIAGNOSIS — G40.909 SEIZURE DISORDER (HCC): ICD-10-CM

## 2021-11-17 RX ORDER — CARBAMAZEPINE 200 MG/1
TABLET, EXTENDED RELEASE ORAL
Qty: 120 TABLET | Refills: 0 | Status: SHIPPED | OUTPATIENT
Start: 2021-11-17 | End: 2021-12-17

## 2021-11-17 NOTE — TELEPHONE ENCOUNTER
Pharmacy requesting refill of Tegretol XR.       Medication active on med list yes      Date of last fill: 9/22/21  verified on 11/17/2021   verified by [facility] LPN      Date of last appointment 2/23/21 with Dr Helen Carrasco    Next Visit Date:  Visit date not found, pt NS appt on 8/12/21 with Dr Davion Gore

## 2021-11-18 PROBLEM — R74.8 LIVER ENZYME ELEVATION: Status: ACTIVE | Noted: 2021-11-18

## 2021-11-18 PROBLEM — Z00.00 MEDICARE ANNUAL WELLNESS VISIT, SUBSEQUENT: Status: ACTIVE | Noted: 2021-11-18

## 2021-11-18 PROBLEM — E83.51 HYPOCALCEMIA: Status: ACTIVE | Noted: 2021-11-18

## 2021-12-04 ENCOUNTER — HOSPITAL ENCOUNTER (OUTPATIENT)
Dept: MRI IMAGING | Age: 36
Discharge: HOME | End: 2021-12-06
Payer: COMMERCIAL

## 2021-12-04 DIAGNOSIS — M25.552 ACUTE HIP PAIN, LEFT: ICD-10-CM

## 2021-12-04 PROCEDURE — 73721 MRI JNT OF LWR EXTRE W/O DYE: CPT

## 2021-12-09 DIAGNOSIS — G40.909 SEIZURE DISORDER (HCC): ICD-10-CM

## 2021-12-13 ENCOUNTER — HOSPITAL ENCOUNTER (OUTPATIENT)
Age: 36
Discharge: HOME | End: 2021-12-13

## 2021-12-16 ENCOUNTER — OFFICE VISIT (OUTPATIENT)
Dept: ORTHOPEDIC SURGERY | Age: 36
End: 2021-12-16
Payer: COMMERCIAL

## 2021-12-16 VITALS — BODY MASS INDEX: 25.21 KG/M2 | HEIGHT: 62 IN | RESPIRATION RATE: 14 BRPM | WEIGHT: 137 LBS

## 2021-12-16 DIAGNOSIS — M85.60 BONE CYST: ICD-10-CM

## 2021-12-16 DIAGNOSIS — M25.552 HIP PAIN, LEFT: Primary | ICD-10-CM

## 2021-12-16 PROCEDURE — 20610 DRAIN/INJ JOINT/BURSA W/O US: CPT | Performed by: PHYSICIAN ASSISTANT

## 2021-12-16 PROCEDURE — 99203 OFFICE O/P NEW LOW 30 MIN: CPT | Performed by: PHYSICIAN ASSISTANT

## 2021-12-16 RX ORDER — BETAMETHASONE SODIUM PHOSPHATE AND BETAMETHASONE ACETATE 3; 3 MG/ML; MG/ML
12 INJECTION, SUSPENSION INTRA-ARTICULAR; INTRALESIONAL; INTRAMUSCULAR; SOFT TISSUE ONCE
Status: COMPLETED | OUTPATIENT
Start: 2021-12-16 | End: 2021-12-16

## 2021-12-16 RX ORDER — BUPIVACAINE HYDROCHLORIDE 5 MG/ML
1 INJECTION, SOLUTION PERINEURAL ONCE
Status: COMPLETED | OUTPATIENT
Start: 2021-12-16 | End: 2021-12-16

## 2021-12-16 RX ORDER — LIDOCAINE HYDROCHLORIDE 10 MG/ML
2 INJECTION, SOLUTION INFILTRATION; PERINEURAL ONCE
Status: COMPLETED | OUTPATIENT
Start: 2021-12-16 | End: 2021-12-16

## 2021-12-16 RX ADMIN — BETAMETHASONE SODIUM PHOSPHATE AND BETAMETHASONE ACETATE 12 MG: 3; 3 INJECTION, SUSPENSION INTRA-ARTICULAR; INTRALESIONAL; INTRAMUSCULAR; SOFT TISSUE at 15:00

## 2021-12-16 RX ADMIN — BUPIVACAINE HYDROCHLORIDE 5 MG: 5 INJECTION, SOLUTION PERINEURAL at 14:59

## 2021-12-16 RX ADMIN — LIDOCAINE HYDROCHLORIDE 2 ML: 10 INJECTION, SOLUTION INFILTRATION; PERINEURAL at 14:58

## 2021-12-16 NOTE — PROGRESS NOTES
[facility]      Patient ID: Blake Bates is a 43 y.o. female    Chief Compliant:  Chief Complaint   Patient presents with    Hip Pain     left        HPI:  This is a 43 y.o. female who presents to the clinic today for evaluation of of left hip pain. Patient was seen by her family doctor ordered a left hip MRI and found to have a possible cyst and presents for evaluation. Patient states the pain is when she walks, or palpates the left side of the hip. Denies any injury      Patient states that she had a stroke at the age of [age] which caused left-sided weakness in her upper and lower extremities. Review of Systems     Denies chest pain  Denies n/v/d/c and abdominal pain  Denies fevers/chills  C/o left hip pain    All other systems reviewed and are negative. Past History:    Current Outpatient Medications:     HYDROcodone-ibuprofen (VICOPROFEN) 7.5-200 MG per tablet, Take 1 tablet by mouth every 8 hours as needed for Pain for up to 5 days. , Disp: 15 tablet, Rfl: 0    senna (SENOKOT) 8.6 MG tablet, Take 1 tablet by mouth 2 times daily, Disp: 60 tablet, Rfl: 0    cyclobenzaprine (FLEXERIL) 10 MG tablet, Take 1 tablet by mouth every evening, Disp: 30 tablet, Rfl: 0    carBAMazepine (TEGRETOL XR) 200 MG extended release tablet, TAKE 2 TABLETS BY MOUTH 2 TIMES DAILY, Disp: 120 tablet, Rfl: 0    magnesium oxide (MAG-OX) 400 (241.3 Mg) MG TABS tablet, TAKE 2 TABLETS BY MOUTH DAILY 400MG DAILY OR TWICE A DAY, HOLD IF LOOSE STOOL., Disp: 60 tablet, Rfl: 0    Riboflavin (B-2) 100 MG TABS, TAKE 1 TABLET BY MOUTH DAILY, Disp: 30 tablet, Rfl: 0    albuterol sulfate  (90 Base) MCG/ACT inhaler, INHALE 2 PUFFS INTO THE LUNGS 4 TIMES DAILY AS NEEDED FOR WHEEZING, Disp: , Rfl:     amitriptyline (ELAVIL) 50 MG tablet, Take 1.5 tablets by mouth nightly, Disp: 45 tablet, Rfl: 3    Lactobacillus Acid-Pectin (ACIDOPHILUS/CITRUS PECTIN) TABS, TAKE 1 TABLET/CAPLET BY MOUTH DAILY, Disp: 90 tablet, Rfl: 3    rizatriptan (MAXALT) 10 MG tablet, MAY REPEAT IN 2 HOURS IF NEEDED, NO MORE THAN 2 PILLS A DAY, NO MORE THAN 2 DAYS A WEEK, Disp: 9 tablet, Rfl: 3    Erenumab-aooe (AIMOVIG) 70 MG/ML SOAJ, Inject 70 mg into the skin every 30 days, Disp: 1 pen, Rfl: 0  Allergies   Allergen Reactions    Bactrim [Sulfamethoxazole-Trimethoprim] Hives     hives     Social History     Socioeconomic History    Marital status: Single     Spouse name: Not on file    Number of children: Not on file    Years of education: Not on file    Highest education level: Not on file   Occupational History    Not on file   Tobacco Use    Smoking status: Light Tobacco Smoker     Packs/day: 0.25    Smokeless tobacco: Never Used   Vaping Use    Vaping Use: Never used   Substance and Sexual Activity    Alcohol use: No    Drug use: No    Sexual activity: Not on file   Other Topics Concern    Not on file   Social History Narrative    Not on file     Social Determinants of Health     Financial Resource Strain:     Difficulty of Paying Living Expenses: Not on file   Food Insecurity:     Worried About Running Out of Food in the Last Year: Not on file    Heri of Food in the Last Year: Not on file   Transportation Needs:     Lack of Transportation (Medical): Not on file    Lack of Transportation (Non-Medical):  Not on file   Physical Activity:     Days of Exercise per Week: Not on file    Minutes of Exercise per Session: Not on file   Stress:     Feeling of Stress : Not on file   Social Connections:     Frequency of Communication with Friends and Family: Not on file    Frequency of Social Gatherings with Friends and Family: Not on file    Attends Restoration Services: Not on file    Active Member of Clubs or Organizations: Not on file    Attends Club or Organization Meetings: Not on file    Marital Status: Not on file   Intimate Partner Violence:     Fear of Current or Ex-Partner: Not on file    Emotionally Abused: Not on file    Physically Abused: Not on file    Sexually Abused: Not on file   Housing Stability:     Unable to Pay for Housing in the Last Year: Not on file    Number of Places Lived in the Last Year: Not on file    Unstable Housing in the Last Year: Not on file     Past Medical History:   Diagnosis Date    Headache     Meningitis spinal     Seizures ([address]     Stroke [facility])      Past Surgical History:   Procedure Laterality Date    BRAIN SURGERY      cyst and fluid build up    SHUNT REMOVAL       Family History   Problem Relation Age of Onset   [place] Migraines Mother     Heart Disease Mother     Heart Attack Father     Heart Disease Father     High Blood Pressure Maternal Grandmother     Cancer Maternal Grandfather         Physical Exam:  Vitals signs and nursing note reviewed. Appearance: well-developed, no distress. Head: Normocephalic and atraumatic. Nose: Nose normal.      Conjunctiva/sclera: Conjunctivae normal.        Musculoskeletal:   There is tenderness palpation to the general left greater trochanteric bursa, no warmth to the area, no erythema or swelling. There is some mild tenderness and pain with logrolling of the left hip    Patient has decreased range of motion with abduction and flexion of the left hip    Distal neurovascularly intact    Patient does have a chronic limp due to history of stroke    Lungs: effort is normal. No respiratory distress. Skin: warm and dry. Mental Status: Alert and oriented to person, place, and time. Sensory: No sensory deficit. Behavior: Behavior normal.      Thought Content:  Thought content normal.        Diagnostic imaging:  MRI HIP LEFT WO CONTRAST    Result Date: 12/6/2021  EXAMINATION: MRI OF THE LEFT HIP WITHOUT CONTRAST, 12/4/2021 5:01 pm TECHNIQUE: Multiplanar multisequence MRI of the hip was performed without the administration of intravenous contrast. COMPARISON: Left hip plain radiographs from 09/30/2021 HISTORY: ORDERING SYSTEM PROVIDED HISTORY: Acute hip pain, left TECHNOLOGIST PROVIDED HISTORY: left hip pain Is the patient pregnant?->No Reason for Exam: sudden onset of left hip pain 3 months ago Acuity: Chronic Type of Exam: Subsequent/Follow-up Additional signs and symptoms: pain with ROM and painful to touch Relevant Medical/Surgical History: no known injury 26-year-old female with acute onset left hip pain and limited range of motion; pain to touch FINDINGS: BONE MARROW: 1.2 cm subcortical cystic change versus impingement fibrocyst at the left femoral head and neck junction on image 12, series 4 and image 13, series 8. Bone marrow signal intensity within the visualized lumbar vertebral bodies, sacral ala, iliac wings, acetabula, pubic rami, and proximal femurs is grossly unremarkable. No signal changes at the femoral heads to suggest femoral head AVN. HIP JOINT: No sizable hip joint effusions. Both femoral heads properly located in the bilateral acetabula without clear evidence for acute fracture, dislocation or femoral head flattening. No significant left hip chondromalacia. LABRUM: No left acetabular labral tearing or paralabral cyst formation. BURSAE: No significant fluid in the bilateral iliopsoas or greater trochanteric bursa. SCIATIC NERVE: Proximal sciatic nerves demonstrate normal course, contour, and caliber on limited coronal T1 weighted imaging. MUSCLES / TENDONS: Visualized muscle/tendon appears grossly intact without evidence of tearing. INTRAPELVIC CONTENTS / SOFT TISSUES: Bilateral ovarian follicles. No significant free fluid in the pelvis. 1. Impingement fibrocyst versus subcortical cystic change measuring up to 1.2 cm. 2. No acute fracture or dislocation. No femoral head AVN. 3. Bilateral ovarian follicles. 4. No left acetabular labral tear or paralabral cyst formation. Assessment and Plan:  Eliazar Duval was seen today for hip pain.     Diagnoses and all orders for this visit:    Hip pain, left  -     20610 - DRAIN/INJECT LARGE JOINT BURSA  -     lidocaine 1 % injection 2 mL  -     betamethasone acetate-betamethasone sodium phosphate (CELESTONE) injection 12 mg  -     bupivacaine (MARCAINE) 0.5 % injection 5 mg  -     [facility]  -     IR ARTHR/ASP/INJ MAJOR JT/BURSA LEFT WO US; Future    Bone cyst  -     20610 - DRAIN/INJECT LARGE JOINT BURSA  -     lidocaine 1 % injection 2 mL  -     betamethasone acetate-betamethasone sodium phosphate (CELESTONE) injection 12 mg  -     bupivacaine (MARCAINE) 0.5 % injection 5 mg  -     [facility]  -     IR ARTHR/ASP/INJ MAJOR JT/BURSA LEFT WO US; Future          Administrations This Visit     betamethasone acetate-betamethasone sodium phosphate (CELESTONE) injection 12 mg     Admin Date  12/16/2021  15:00 Action  Given Dose  12 mg Route  Intra-artICUlar Site  Hip Left Administered By  Esther Jiang LPN    Ordering Provider: CHARLI Lundberg    NDC: 4850-9150-81    Lot#: [id number]    : [employer]    Patient Supplied?: No          bupivacaine (MARCAINE) 0.5 % injection 5 mg     Admin Date  12/16/2021  14:59 Action  Given Dose  5 mg Route  Intra-artICUlar Site  Hip Left Administered By  Esther Jiang LPN    Ordering Provider: CHARLI Lundberg    NDC: 2836-9189-58    Lot#: [id number]    : Emily Monzon    Patient Supplied?: No          lidocaine 1 % injection 2 mL     Admin Date  12/16/2021  14:58 Action  Given Dose  2 mL Route  Intra-artICUlar Site  Hip Left Administered By  Esther Jiang LPN    Ordering Provider: CHARLI Lundberg    NDC: 6913-7661-14    Lot#: [id number]    : Olevia Cho    Patient Supplied?: No                Greater trochanteric bursa injection:  Patient was positioned on the right side, after finding the area of pain in the left greater trochanteric bursa area, area was cleaned with alcohol, area was then injected into the bursa of 2 mL of Cel[place], 2 mL of Marcaine and 2 mL of lidocaine. chaparoned by female LPN and boyfriend of pt in room  No complication        . Return in about 6 weeks (around 1/27/2022) for with Dr. Karli Amin. Provider Attestation:  Patricia Luong, personally performed the services described in this documentation. All medical record entries made by the scribe were at my direction and in my presence. I have reviewed the chart and discharge instructions and agree that the records reflect my personal performance and is accurate and complete. Dallin Sandhu PA-C 12/16/21     Please note that this chart was generated using voice recognition Dragon dictation software. Although every effort was made to ensure the accuracy of this automated transcription, some errors in transcription may have occurred.

## 2021-12-17 ENCOUNTER — TELEPHONE (OUTPATIENT)
Dept: INTERVENTIONAL RADIOLOGY/VASCULAR | Age: 36
End: 2021-12-17

## 2021-12-17 DIAGNOSIS — G43.009 MIGRAINE WITHOUT AURA AND WITHOUT STATUS MIGRAINOSUS, NOT INTRACTABLE: Primary | ICD-10-CM

## 2021-12-17 RX ORDER — CARBAMAZEPINE 200 MG/1
TABLET, EXTENDED RELEASE ORAL
Qty: 120 TABLET | Refills: 2 | Status: SHIPPED | OUTPATIENT
Start: 2021-12-17 | End: 2022-03-17

## 2021-12-17 NOTE — TELEPHONE ENCOUNTER
Pharmacy requesting refill of carbamazepine  mg.       Medication active on med list yes      Date of last fill: 11/17/21  with 0 refills verified on 12/17/21  verified by JEF ROBERSON      Date of last appointment 2/23/21    Next Visit Date: scheduled her for 2/23

## 2021-12-18 PROBLEM — Z00.00 MEDICARE ANNUAL WELLNESS VISIT, SUBSEQUENT: Status: RESOLVED | Noted: 2021-11-18 | Resolved: 2021-12-18

## 2021-12-20 PROBLEM — M25.852 LEFT HIP IMPINGEMENT SYNDROME: Status: ACTIVE | Noted: 2021-12-20

## 2022-01-05 ENCOUNTER — HOSPITAL ENCOUNTER (OUTPATIENT)
Dept: INTERVENTIONAL RADIOLOGY/VASCULAR | Age: 37
Discharge: HOME | End: 2022-01-07
Payer: MEDICARE

## 2022-01-05 VITALS
RESPIRATION RATE: 16 BRPM | OXYGEN SATURATION: 98 % | HEART RATE: 106 BPM | DIASTOLIC BLOOD PRESSURE: 91 MMHG | SYSTOLIC BLOOD PRESSURE: 126 MMHG

## 2022-01-05 DIAGNOSIS — M25.552 HIP PAIN, LEFT: ICD-10-CM

## 2022-01-05 DIAGNOSIS — M85.60 BONE CYST: ICD-10-CM

## 2022-01-05 PROCEDURE — 2500000003 HC RX 250 WO HCPCS: Performed by: PHYSICIAN ASSISTANT

## 2022-01-05 PROCEDURE — 77002 NEEDLE LOCALIZATION BY XRAY: CPT

## 2022-01-05 PROCEDURE — 6360000004 HC RX CONTRAST MEDICATION: Performed by: RADIOLOGY

## 2022-01-05 PROCEDURE — 20610 DRAIN/INJ JOINT/BURSA W/O US: CPT

## 2022-01-05 PROCEDURE — 6360000002 HC RX W HCPCS: Performed by: PHYSICIAN ASSISTANT

## 2022-01-05 PROCEDURE — 2709999900 IR ARTHR/ASP/INJ MAJOR JT/BURSA LEFT WO US

## 2022-01-05 RX ORDER — LIDOCAINE HYDROCHLORIDE 10 MG/ML
4 INJECTION, SOLUTION EPIDURAL; INFILTRATION; INTRACAUDAL; PERINEURAL ONCE
Status: COMPLETED | OUTPATIENT
Start: 2022-01-05 | End: 2022-01-05

## 2022-01-05 RX ORDER — BUPIVACAINE HYDROCHLORIDE 5 MG/ML
4 INJECTION, SOLUTION EPIDURAL; INTRACAUDAL ONCE
Status: COMPLETED | OUTPATIENT
Start: 2022-01-05 | End: 2022-01-05

## 2022-01-05 RX ORDER — METHYLPREDNISOLONE ACETATE 80 MG/ML
80 INJECTION, SUSPENSION INTRA-ARTICULAR; INTRALESIONAL; INTRAMUSCULAR; SOFT TISSUE ONCE
Status: COMPLETED | OUTPATIENT
Start: 2022-01-05 | End: 2022-01-05

## 2022-01-05 RX ADMIN — BUPIVACAINE HYDROCHLORIDE 4 ML: 5 INJECTION, SOLUTION EPIDURAL; INTRACAUDAL at 14:35

## 2022-01-05 RX ADMIN — METHYLPREDNISOLONE ACETATE 80 MG: 80 INJECTION, SUSPENSION INTRA-ARTICULAR; INTRALESIONAL; INTRAMUSCULAR; SOFT TISSUE at 14:35

## 2022-01-05 RX ADMIN — IOPAMIDOL 2 ML: 612 INJECTION, SOLUTION INTRAVENOUS at 14:36

## 2022-01-05 RX ADMIN — LIDOCAINE HYDROCHLORIDE 4 ML: 10 INJECTION, SOLUTION EPIDURAL; INFILTRATION; INTRACAUDAL; PERINEURAL at 14:36

## 2022-01-05 ASSESSMENT — PAIN SCALES - GENERAL: PAINLEVEL_OUTOF10: 0

## 2022-01-05 NOTE — PROGRESS NOTES
Patient tolerated left hip injection without distress. Dressing to site. Discharge instructions given, no questions at this time. Patient discharged home via private auto.

## 2022-01-05 NOTE — BRIEF OP NOTE
Brief Postoperative Note    Lane Jha  YOB: 1985  [id number]    Pre-operative Diagnosis: left hip pain    Post-operative Diagnosis: Same    Procedure: left hip injection    Anesthesia: Local    Surgeons/Assistants: izabela    Estimated Blood Loss: less than 50     Complications: None    Specimens: Was Not Obtained    Findings: Successful left hip injection    Electronically signed by Evelia Hawthorne MD on 1/5/2022 at 2:54 PM

## 2022-01-20 ENCOUNTER — OFFICE VISIT (OUTPATIENT)
Dept: ORTHOPEDIC SURGERY | Age: 37
End: 2022-01-20
Payer: MEDICARE

## 2022-01-20 VITALS — WEIGHT: 136 LBS | BODY MASS INDEX: 25.03 KG/M2 | HEIGHT: 62 IN | RESPIRATION RATE: 14 BRPM

## 2022-01-20 DIAGNOSIS — M25.552 HIP PAIN, LEFT: Primary | ICD-10-CM

## 2022-01-20 PROCEDURE — 20552 NJX 1/MLT TRIGGER POINT 1/2: CPT | Performed by: ORTHOPAEDIC SURGERY

## 2022-01-20 RX ORDER — HYDROCODONE BITARTRATE AND IBUPROFEN 7.5; 2 MG/1; MG/1
1 TABLET, FILM COATED ORAL EVERY 8 HOURS PRN
COMMUNITY
End: 2022-01-31 | Stop reason: SDUPTHER

## 2022-01-20 RX ORDER — LIDOCAINE HYDROCHLORIDE 10 MG/ML
2 INJECTION, SOLUTION INFILTRATION; PERINEURAL ONCE
Status: COMPLETED | OUTPATIENT
Start: 2022-01-20 | End: 2022-01-20

## 2022-01-20 RX ORDER — BETAMETHASONE SODIUM PHOSPHATE AND BETAMETHASONE ACETATE 3; 3 MG/ML; MG/ML
12 INJECTION, SUSPENSION INTRA-ARTICULAR; INTRALESIONAL; INTRAMUSCULAR; SOFT TISSUE ONCE
Status: COMPLETED | OUTPATIENT
Start: 2022-01-20 | End: 2022-01-20

## 2022-01-20 RX ADMIN — LIDOCAINE HYDROCHLORIDE 2 ML: 10 INJECTION, SOLUTION INFILTRATION; PERINEURAL at 15:30

## 2022-01-20 RX ADMIN — BETAMETHASONE SODIUM PHOSPHATE AND BETAMETHASONE ACETATE 12 MG: 3; 3 INJECTION, SUSPENSION INTRA-ARTICULAR; INTRALESIONAL; INTRAMUSCULAR; SOFT TISSUE at 15:30

## 2022-01-20 NOTE — PROGRESS NOTES
Patient ID: Margarita Daley is a 43 y.o. female    Chief Compliant:  Chief Complaint   Patient presents with    Hip Pain     left        Diagnostic imaging:    MRI left hip is reviewed patient with a benign cyst in the femoral neck distant to the femoral head. Otherwise no significant arthritis AVN. Assessment and Plan:  1. Hip pain, left      Today's exam pain is over anterior superior iliac crest      Left anterior superior iliac crest injection     Follow up 2 weeks    An informed verbal consent for the procedure was obtained and risks including, but not limited to: allergy to medications, injection, bleeding, stiffness of joint, recurrence of symptoms, loss of function, swelling, drainage, irrigation, need for surgery and pseudo-septic inflammation, were explained to the patient. Also, discussed was the potential for further injections, irrigation and debridement and surgery. Alternate means of treatment have also been discussed with the patient. Administrations This Visit     betamethasone acetate-betamethasone sodium phosphate (CELESTONE) injection 12 mg     Admin Date  01/20/2022  15:30 Action  Given Dose  12 mg Route  Intra-artICUlar Site  Hip Left Administered By  Aretha Salas LPN    Ordering Provider: Isela Verma MD    NDC: 6070-7318-20    Lot#: [id number]    : [employer]    Patient Supplied?: No          lidocaine 1 % injection 2 mL     Admin Date  01/20/2022  15:30 Action  Given Dose  2 mL Route  Intra-artICUlar Site  Hip Left Administered By  Aretha Salas LPN    Ordering Provider: Isela Verma MD    [address]: 3824-3138-24    Lot#: [id number]. [id number]    : [facility] [employer] [facility]    Patient Supplied?: No                  HPI:  This is a 43 y.o. female who presents to the clinic today as a new patient for left hip pain. Hx of CVA, she denies significant lower extremity spasticity     Patient notes left groin pain that is tender to palpation ongoing since September.  She notes difficulty with sitting or standing in one position. She has a left foot drop, has been provided a brace in the past however does not utilize it    No greater trochanteric tenderness    Review of Systems   All other systems reviewed and are negative.       Past History:    Current Outpatient Medications:     carBAMazepine (TEGRETOL XR) 200 MG extended release tablet, TAKE 2 TABLETS BY MOUTH 2 TIMES DAILY, Disp: 120 tablet, Rfl: 2    senna (SENOKOT) 8.6 MG tablet, Take 1 tablet by mouth 2 times daily, Disp: 60 tablet, Rfl: 0    magnesium oxide (MAG-OX) 400 (241.3 Mg) MG TABS tablet, TAKE 2 TABLETS BY MOUTH DAILY 400MG DAILY OR TWICE A DAY, HOLD IF LOOSE STOOL., Disp: 60 tablet, Rfl: 0    Riboflavin (B-2) 100 MG TABS, TAKE 1 TABLET BY MOUTH DAILY, Disp: 30 tablet, Rfl: 0    albuterol sulfate  (90 Base) MCG/ACT inhaler, INHALE 2 PUFFS INTO THE LUNGS 4 TIMES DAILY AS NEEDED FOR WHEEZING, Disp: , Rfl:     amitriptyline (ELAVIL) 50 MG tablet, Take 1.5 tablets by mouth nightly, Disp: 45 tablet, Rfl: 3    Lactobacillus Acid-Pectin (ACIDOPHILUS/CITRUS PECTIN) TABS, TAKE 1 TABLET/CAPLET BY MOUTH DAILY, Disp: 90 tablet, Rfl: 3    rizatriptan (MAXALT) 10 MG tablet, MAY REPEAT IN 2 HOURS IF NEEDED, NO MORE THAN 2 PILLS A DAY, NO MORE THAN 2 DAYS A WEEK, Disp: 9 tablet, Rfl: 3    Erenumab-aooe (AIMOVIG) 70 MG/ML SOAJ, Inject 70 mg into the skin every 30 days, Disp: 1 pen, Rfl: 0  Allergies   Allergen Reactions    Bactrim [Sulfamethoxazole-Trimethoprim] Hives     hives    Sulfamethoxazole      Social History     Socioeconomic History    Marital status: Single     Spouse name: Not on file    Number of children: Not on file    Years of education: Not on file    Highest education level: Not on file   Occupational History    Not on file   Tobacco Use    Smoking status: Light Tobacco Smoker     Packs/day: 0.25    Smokeless tobacco: Never Used   Vaping Use    Vaping Use: Never used   Substance and Sexual Activity    Alcohol use: No    Drug use: No    Sexual activity: Not on file   Other Topics Concern    Not on file   Social History Narrative    Not on file     Social Determinants of Health     Financial Resource Strain:     Difficulty of Paying Living Expenses: Not on file   Food Insecurity:     Worried About Running Out of Food in the Last Year: Not on file    Heri of Food in the Last Year: Not on file   Transportation Needs:     Lack of Transportation (Medical): Not on file    Lack of Transportation (Non-Medical):  Not on file   Physical Activity:     Days of Exercise per Week: Not on file    Minutes of Exercise per Session: Not on file   Stress:     Feeling of Stress : Not on file   Social Connections:     Frequency of Communication with Friends and Family: Not on file    Frequency of Social Gatherings with Friends and Family: Not on file    Attends Caodaism Services: Not on file    Active Member of [address] [place] [address] or Organizations: Not on file    Attends Club or Organization Meetings: Not on file    Marital Status: Not on file   Intimate Partner Violence:     Fear of Current or Ex-Partner: Not on file    Emotionally Abused: Not on file    Physically Abused: Not on file    Sexually Abused: Not on file   Housing Stability:     Unable to Pay for Housing in the Last Year: Not on file    Number of [place] in the Last Year: Not on file    Unstable Housing in the Last Year: Not on file     Past Medical History:   Diagnosis Date    Headache     Meningitis spinal     Seizures ([facility] [address]     Stroke [facility])      Past Surgical History:   Procedure Laterality Date    BRAIN SURGERY      cyst and fluid build up    SHUNT REMOVAL       Family History   Problem Relation Age of Onset    Migraines Mother     Heart Disease Mother     Heart Attack Father     Heart Disease Father     High Blood Pressure Maternal Grandmother     Cancer Maternal Grandfather         Physical Exam:  Vitals signs and nursing note reviewed. Constitutional:       Appearance: well-developed. HENT:      Head: Normocephalic and atraumatic. Nose: Nose normal.   Eyes:      Conjunctiva/sclera: Conjunctivae normal.   Neck:      Musculoskeletal: Normal range of motion and neck supple. Pulmonary:      Effort: Pulmonary effort is normal. No respiratory distress. Musculoskeletal:      Comments: Normal gait     Skin:     General: Skin is warm and dry. Neurological:      Mental Status: Alert and oriented to person, place, and time. Sensory: No sensory deficit. Psychiatric:         Behavior: Behavior normal.         Thought Content: Thought content normal.    Tenderness over the left anterior superior iliac crest    Full pain free left hip ROM    Provider Attestation:  Danyelle Chang, personally performed the services described in this documentation. All medical record entries made by the scribe were at my direction and in my presence. I have reviewed the chart and discharge instructions and agree that the records reflect my personal performance and is accurate and complete. Charla Berger MD 1/20/22     Scribe Attestation:  By signing my name below, Nhan Hernandez, attest that this documentation has been prepared under the direction and in the presence of Dr. Bella Abdi. Electronically signed: Marlene Kelly, 1/20/22     Please note that this chart was generated using voice recognition Dragon dictation software. Although every effort was made to ensure the accuracy of this automated transcription, some errors in transcription may have occurred.

## 2022-02-10 ENCOUNTER — OFFICE VISIT (OUTPATIENT)
Dept: ORTHOPEDIC SURGERY | Age: 37
End: 2022-02-10
Payer: MEDICARE

## 2022-02-10 VITALS — RESPIRATION RATE: 12 BRPM | BODY MASS INDEX: 24.84 KG/M2 | HEIGHT: 62 IN | WEIGHT: 135 LBS

## 2022-02-10 DIAGNOSIS — M25.552 HIP PAIN, LEFT: Primary | ICD-10-CM

## 2022-02-10 PROCEDURE — G8427 DOCREV CUR MEDS BY ELIG CLIN: HCPCS | Performed by: ORTHOPAEDIC SURGERY

## 2022-02-10 PROCEDURE — 99213 OFFICE O/P EST LOW 20 MIN: CPT | Performed by: ORTHOPAEDIC SURGERY

## 2022-02-10 PROCEDURE — G8484 FLU IMMUNIZE NO ADMIN: HCPCS | Performed by: ORTHOPAEDIC SURGERY

## 2022-02-10 PROCEDURE — 4004F PT TOBACCO SCREEN RCVD TLK: CPT | Performed by: ORTHOPAEDIC SURGERY

## 2022-02-10 PROCEDURE — G8420 CALC BMI NORM PARAMETERS: HCPCS | Performed by: ORTHOPAEDIC SURGERY

## 2022-02-10 NOTE — PROGRESS NOTES
Patient ID: Suhail Edmondson is a 43 y.o. female    Chief Compliant:  Chief Complaint   Patient presents with    Follow-up     L Hip        Diagnostic imaging:  MRI left hip again reviewed normal    Patient remains tender over the anterior superior leg crest this is not included in the scan      Assessment and Plan:  1. Hip pain, left      Localized left groin pain over the anterior superior iliac crest    Follow up    HPI:  This is a 43 y.o. female who presents to the clinic today for left hip pain. Patient reports no relief with injection at the last visit. Patient notes ongoing left groin pain, worst over the anterior superior iliac crest tender to palpation. Symptoms have been ongoing since September. Review of Systems   All other systems reviewed and are negative. Past History:    Current Outpatient Medications:     HYDROcodone-ibuprofen (VICOPROFEN) 7.5-200 MG per tablet, Take 1 tablet by mouth every 8 hours as needed for Pain for up to 14 days. , Disp: 42 tablet, Rfl: 0    amitriptyline (ELAVIL) 50 MG tablet, Take 1.5 tablets by mouth nightly, Disp: 45 tablet, Rfl: 3    carBAMazepine (TEGRETOL XR) 200 MG extended release tablet, TAKE 2 TABLETS BY MOUTH 2 TIMES DAILY, Disp: 120 tablet, Rfl: 2    senna (SENOKOT) 8.6 MG tablet, Take 1 tablet by mouth 2 times daily, Disp: 60 tablet, Rfl: 0    magnesium oxide (MAG-OX) 400 (241.3 Mg) MG TABS tablet, TAKE 2 TABLETS BY MOUTH DAILY 400MG DAILY OR TWICE A DAY, HOLD IF LOOSE STOOL., Disp: 60 tablet, Rfl: 0    Riboflavin (B-2) 100 MG TABS, TAKE 1 TABLET BY MOUTH DAILY, Disp: 30 tablet, Rfl: 0    albuterol sulfate  (90 Base) MCG/ACT inhaler, INHALE 2 PUFFS INTO THE LUNGS 4 TIMES DAILY AS NEEDED FOR WHEEZING, Disp: , Rfl:     Lactobacillus Acid-Pectin (ACIDOPHILUS/CITRUS PECTIN) TABS, TAKE 1 TABLET/CAPLET BY MOUTH DAILY, Disp: 90 tablet, Rfl: 3    rizatriptan (MAXALT) 10 MG tablet, MAY REPEAT IN 2 HOURS IF NEEDED, NO MORE THAN 2 PILLS A DAY, NO MORE THAN 2 DAYS A WEEK, Disp: 9 tablet, Rfl: 3    Erenumab-aooe (AIMOVIG) 70 MG/ML SOAJ, Inject 70 mg into the skin every 30 days, Disp: 1 pen, Rfl: 0  Allergies   Allergen Reactions    Bactrim [Sulfamethoxazole-Trimethoprim] Hives     hives    Sulfamethoxazole      Social History     Socioeconomic History    Marital status: Single     Spouse name: Not on file    Number of children: Not on file    Years of education: Not on file    Highest education level: Not on file   Occupational History    Not on file   Tobacco Use    Smoking status: Light Tobacco Smoker     Packs/day: 0.25    Smokeless tobacco: Never Used   Vaping Use    Vaping Use: Never used   Substance and Sexual Activity    Alcohol use: No    Drug use: No    Sexual activity: Not on file   Other Topics Concern    Not on file   Social History Narrative    Not on file     Social Determinants of Health     Financial Resource Strain:     Difficulty of Paying Living Expenses: Not on file   Food Insecurity:     Worried About Running Out of Food in the Last Year: Not on file    Heri of Food in the Last Year: Not on file   Transportation Needs:     Lack of Transportation (Medical): Not on file    Lack of Transportation (Non-Medical):  Not on file   Physical Activity:     Days of Exercise per Week: Not on file    Minutes of Exercise per Session: Not on file   Stress:     Feeling of Stress : Not on file   Social Connections:     Frequency of Communication with Friends and Family: Not on file    Frequency of Social Gatherings with Friends and Family: Not on file    Attends Orthodoxy Services: Not on file    Active Member of Clubs or Organizations: Not on file    Attends Club or Organization Meetings: Not on file    Marital Status: Not on file   Intimate Partner Violence:     Fear of Current or Ex-Partner: Not on file    Emotionally Abused: Not on file    Physically Abused: Not on file    Sexually Abused: Not on file   Housing Stability:     Unable to Pay for Housing in the Last Year: Not on file    Number of Places Lived in the Last Year: Not on file    Unstable Housing in the Last Year: Not on file     Past Medical History:   Diagnosis Date    Headache     Meningitis spinal     Seizures ([address]     Stroke [facility])      Past Surgical History:   Procedure Laterality Date    BRAIN SURGERY      cyst and fluid build up    SHUNT REMOVAL       Family History   Problem Relation Age of Onset   Rojas Jain Migraines Mother     Heart Disease Mother     Heart Attack Father     Heart Disease Father     High Blood Pressure Maternal Grandmother     Cancer Maternal Grandfather         Physical Exam:  Vitals signs and nursing note reviewed. Constitutional:       Appearance: well-developed. HENT:      Head: Normocephalic and atraumatic. Nose: Nose normal.   Eyes:      Conjunctiva/sclera: Conjunctivae normal.   Neck:      Musculoskeletal: Normal range of motion and neck supple. Pulmonary:      Effort: Pulmonary effort is normal. No respiratory distress. Musculoskeletal:      Comments: Normal gait     Skin:     General: Skin is warm and dry. Neurological:      Mental Status: Alert and oriented to person, place, and time. Sensory: No sensory deficit. Psychiatric:         Behavior: Behavior normal.         Thought Content: Thought content normal.    Physical exam patient with stigmata of a stroke at 3months of age with left slightly spastic hemiparesis    Provider Attestation:  Rhoda Chang, personally performed the services described in this documentation. All medical record entries made by the scribe were at my direction and in my presence. I have reviewed the chart and discharge instructions and agree that the records reflect my personal performance and is accurate and complete. Saira Garvey MD 2/10/22     Scribe Attestation:  By signing my name below, Kin Underwood, attest that this documentation has been prepared under the direction and in the presence of Dr. Shelia Driver. Electronically signed: Marlene Alejandre, 2/10/22     Please note that this chart was generated using voice recognition Dragon dictation software. Although every effort was made to ensure the accuracy of this automated transcription, some errors in transcription may have occurred.

## 2022-02-17 DIAGNOSIS — G40.909 SEIZURE DISORDER (HCC): ICD-10-CM

## 2022-02-17 RX ORDER — CARBAMAZEPINE 200 MG/1
TABLET, EXTENDED RELEASE ORAL
Qty: 120 TABLET | Refills: 2 | OUTPATIENT
Start: 2022-02-17

## 2022-02-23 ENCOUNTER — HOSPITAL ENCOUNTER (OUTPATIENT)
Dept: MRI IMAGING | Age: 37
Discharge: HOME | End: 2022-02-25
Payer: MEDICARE

## 2022-02-23 DIAGNOSIS — M25.552 HIP PAIN, LEFT: ICD-10-CM

## 2022-02-23 PROCEDURE — 72195 MRI PELVIS W/O DYE: CPT

## 2022-03-01 ENCOUNTER — OFFICE VISIT (OUTPATIENT)
Dept: ORTHOPEDIC SURGERY | Age: 37
End: 2022-03-01
Payer: MEDICARE

## 2022-03-01 VITALS — BODY MASS INDEX: 24.84 KG/M2 | HEIGHT: 62 IN | RESPIRATION RATE: 16 BRPM | WEIGHT: 135 LBS

## 2022-03-01 DIAGNOSIS — M25.552 HIP PAIN, LEFT: Primary | ICD-10-CM

## 2022-03-01 PROCEDURE — 20550 NJX 1 TENDON SHEATH/LIGAMENT: CPT | Performed by: ORTHOPAEDIC SURGERY

## 2022-03-01 RX ORDER — LIDOCAINE HYDROCHLORIDE 10 MG/ML
2 INJECTION, SOLUTION INFILTRATION; PERINEURAL ONCE
Status: COMPLETED | OUTPATIENT
Start: 2022-03-01 | End: 2022-03-01

## 2022-03-01 RX ORDER — BUPIVACAINE HYDROCHLORIDE 5 MG/ML
2 INJECTION, SOLUTION PERINEURAL ONCE
Status: COMPLETED | OUTPATIENT
Start: 2022-03-01 | End: 2022-03-01

## 2022-03-01 RX ORDER — BETAMETHASONE SODIUM PHOSPHATE AND BETAMETHASONE ACETATE 3; 3 MG/ML; MG/ML
12 INJECTION, SUSPENSION INTRA-ARTICULAR; INTRALESIONAL; INTRAMUSCULAR; SOFT TISSUE ONCE
Status: COMPLETED | OUTPATIENT
Start: 2022-03-01 | End: 2022-03-01

## 2022-03-01 RX ADMIN — LIDOCAINE HYDROCHLORIDE 2 ML: 10 INJECTION, SOLUTION INFILTRATION; PERINEURAL at 15:22

## 2022-03-01 RX ADMIN — BUPIVACAINE HYDROCHLORIDE 10 MG: 5 INJECTION, SOLUTION PERINEURAL at 15:22

## 2022-03-01 RX ADMIN — BETAMETHASONE SODIUM PHOSPHATE AND BETAMETHASONE ACETATE 12 MG: 3; 3 INJECTION, SUSPENSION INTRA-ARTICULAR; INTRALESIONAL; INTRAMUSCULAR; SOFT TISSUE at 15:21

## 2022-03-01 NOTE — LETTER
[address] [address]  Phone: [phone]  Fax: [phone]    Vic Ansari MD        March 1, 2022     Patient: Amrita Guerra   YOB: 1985   Date of Visit: 3/1/2022       To Whom It May Concern:     Porfirio Reynolds  Was seen in our office today for an appointment 3/1/22  If you have any questions or concerns, please don't hesitate to call.     Sincerely,        Vic Ansari MD [No Pertinent Cardiac History] : no history of aortic stenosis, atrial fibrillation, coronary artery disease, recent myocardial infarction, or implantable device/pacemaker [No Adverse Anesthesia Reaction] : no adverse anesthesia reaction in self or family member [(Patient denies any chest pain, claudication, dyspnea on exertion, orthopnea, palpitations or syncope)] : Patient denies any chest pain, claudication, dyspnea on exertion, orthopnea, palpitations or syncope [Asthma] : no asthma [COPD] : no COPD [Sleep Apnea] : no sleep apnea [Smoker] : not a smoker [Chronic Anticoagulation] : no chronic anticoagulation [Chronic Kidney Disease] : no chronic kidney disease [Diabetes] : no diabetes [FreeTextEntry1] : Excision of ganglion cyst from left wrist  [FreeTextEntry2] : 12/11/2020 [FreeTextEntry3] : Dr. Venessa Chau [FreeTextEntry4] : \par 60 year old female presents for a medical clearance. Has cystic mass to her left wrist, likely ganglion cyst, causing her pain. She has seen orthopedic hand surgery and is scheduled for a surgical excision. \par

## 2022-03-01 NOTE — PROGRESS NOTES
Patient ID: Britney Jeffrey is a 43 y.o. female    Chief Compliant:  Chief Complaint   Patient presents with    Hip Pain        Diagnostic imaging:  MRI is reviewed agree with radiologist patient has some tendinosis of the sartorius otherwise normal      Assessment and Plan:  1. Hip pain, left      Left sartorius tendinosis/bursitis      PT    Left proximal sartorius tendon injection    Follow up 6 weeks    An informed verbal consent for the procedure was obtained and risks including, but not limited to: allergy to medications, injection, bleeding, stiffness of joint, recurrence of symptoms, loss of function, swelling, drainage, irrigation, need for surgery and pseudo-septic inflammation, were explained to the patient. Also, discussed was the potential for further injections, irrigation and debridement and surgery. Alternate means of treatment have also been discussed with the patient. Administrations This Visit       betamethasone acetate-betamethasone sodium phosphate (CELESTONE) injection 12 mg       Admin Date  03/01/2022  15:21 Action  Given Dose  12 mg Route  ITendon Site  Hip Left Administered By  Meaghan Adhikari LPN    Ordering Provider: Yonis Chavez MD    NDC: 2772-7319-78    Lot#: [id number]    : [employer]    Patient Supplied?: No              bupivacaine (MARCAINE) 0.5 % injection 10 mg       Admin Date  03/01/2022  15:22 Action  Given Dose  10 mg Route  ITendon Site  Hip Left Administered By  Meaghan Adhikari LPN    Ordering Provider: Yonis Chavez MD    NDC: 2347-6637-40    Lot#: [id number]    : [employer]    Patient Supplied?: No              lidocaine 1 % injection 2 mL       Admin Date  03/01/2022  15:22 Action  Given Dose  2 mL Route  Intra-artICUlar Site  Hip Left Administered By  Meaghan Adhikari LPN    Ordering Provider: Yonis Chavez MD    [facility]: 0929-9022-84    Lot#: [id number]    : Tammie Gonzalez    Patient Supplied?: No                        HPI:  This is a 43 y.o. female who presents to the clinic today for left hip pain. Patient notes ongoing left groin pain, worst over the anterior superior iliac crest tender to palpation. Review of Systems   All other systems reviewed and are negative.       Past History:    Current Outpatient Medications:     magnesium oxide (MAG-OX) 400 (241.3 Mg) MG TABS tablet, Take 2 tablets by mouth nightly, Disp: 60 tablet, Rfl: 2    amitriptyline (ELAVIL) 50 MG tablet, Take 1.5 tablets by mouth nightly, Disp: 45 tablet, Rfl: 3    carBAMazepine (TEGRETOL XR) 200 MG extended release tablet, TAKE 2 TABLETS BY MOUTH 2 TIMES DAILY, Disp: 120 tablet, Rfl: 2    albuterol sulfate  (90 Base) MCG/ACT inhaler, INHALE 2 PUFFS INTO THE LUNGS 4 TIMES DAILY AS NEEDED FOR WHEEZING, Disp: , Rfl:     rizatriptan (MAXALT) 10 MG tablet, MAY REPEAT IN 2 HOURS IF NEEDED, NO MORE THAN 2 PILLS A DAY, NO MORE THAN 2 DAYS A WEEK, Disp: 9 tablet, Rfl: 3    Erenumab-aooe (AIMOVIG) 70 MG/ML SOAJ, Inject 70 mg into the skin every 30 days, Disp: 1 pen, Rfl: 0  Allergies   Allergen Reactions    Bactrim [Sulfamethoxazole-Trimethoprim] Hives     hives    Sulfamethoxazole      Social History     Socioeconomic History    Marital status: Single     Spouse name: Not on file    Number of children: Not on file    Years of education: Not on file    Highest education level: Not on file   Occupational History    Not on file   Tobacco Use    Smoking status: Light Tobacco Smoker     Packs/day: 0.25    Smokeless tobacco: Never Used   Vaping Use    Vaping Use: Never used   Substance and Sexual Activity    Alcohol use: No    Drug use: No    Sexual activity: Not on file   Other Topics Concern    Not on file   Social History Narrative    Not on file     Social Determinants of Health     Financial Resource Strain:     Difficulty of Paying Living Expenses: Not on file   Food Insecurity:     Worried About Running Out of Food in the Last Year: Not on file    Ran Out of Food in the Last Year: Not on file   Transportation Needs:     Lack of Transportation (Medical): Not on file    Lack of Transportation (Non-Medical): Not on file   Physical Activity:     Days of Exercise per Week: Not on file    Minutes of Exercise per Session: Not on file   Stress:     Feeling of Stress : Not on file   Social Connections:     Frequency of Communication with Friends and Family: Not on file    Frequency of Social Gatherings with Friends and Family: Not on file    Attends Scientologist Services: Not on file    Active Member of [address] [place] [address] Grata or Organizations: Not on file    Attends Club or Organization Meetings: Not on file    Marital Status: Not on file   Intimate Partner Violence:     Fear of Current or Ex-Partner: Not on file    Emotionally Abused: Not on file    Physically Abused: Not on file    Sexually Abused: Not on file   Housing Stability:     Unable to Pay for Housing in the Last Year: Not on file    Number of [place] in the Last Year: Not on file    Unstable Housing in the Last Year: Not on file     Past Medical History:   Diagnosis Date    Headache     Meningitis spinal     Seizures ([facility] [address]     Stroke [facility])      Past Surgical History:   Procedure Laterality Date    BRAIN SURGERY      cyst and fluid build up    SHUNT REMOVAL       Family History   Problem Relation Age of Onset    Migraines Mother     Heart Disease Mother     Heart Attack Father     Heart Disease Father     High Blood Pressure Maternal Grandmother     Cancer Maternal Grandfather         Physical Exam:  Vitals signs and nursing note reviewed. Constitutional:       Appearance: well-developed. HENT:      Head: Normocephalic and atraumatic. Nose: Nose normal.   Eyes:      Conjunctiva/sclera: Conjunctivae normal.   Neck:      Musculoskeletal: Normal range of motion and neck supple. Pulmonary:      Effort: Pulmonary effort is normal. No respiratory distress.    Musculoskeletal:      Comments: Normal gait     Skin:     General: Skin is warm and dry. Neurological:      Mental Status: Alert and oriented to person, place, and time. Sensory: No sensory deficit. Psychiatric:         Behavior: Behavior normal.         Thought Content: Thought content normal.        Provider Attestation:  Gerber Chang, personally performed the services described in this documentation. All medical record entries made by the scribe were at my direction and in my presence. I have reviewed the chart and discharge instructions and agree that the records reflect my personal performance and is accurate and complete. Wolf Umaña MD 3/1/22     Scribe Attestation:  By signing my name below, Aixa Wanda, attest that this documentation has been prepared under the direction and in the presence of Dr. Janae Mittal. Electronically signed: Marlene Anna, 3/1/22     Please note that this chart was generated using voice recognition Dragon dictation software. Although every effort was made to ensure the accuracy of this automated transcription, some errors in transcription may have occurred.

## 2022-03-17 DIAGNOSIS — G40.909 SEIZURE DISORDER (HCC): ICD-10-CM

## 2022-03-17 RX ORDER — CARBAMAZEPINE 200 MG/1
TABLET, EXTENDED RELEASE ORAL
Qty: 120 TABLET | Refills: 0 | Status: SHIPPED | OUTPATIENT
Start: 2022-03-17

## 2022-03-17 NOTE — TELEPHONE ENCOUNTER
Pharmacy requesting a  refill of: TegretolXR 200MG     Medication active on med list yes     Date of last prescription: 12/17/2021  with 2  refills verified on 03/17/2022  verified by MAGALY BORGES     Date of last appointment: 02/23/2021     Next Visit Date:  None, message left to schedule follow up appointment.

## 2022-03-18 DIAGNOSIS — G40.909 SEIZURE DISORDER (HCC): ICD-10-CM

## 2022-03-21 RX ORDER — CARBAMAZEPINE 200 MG/1
TABLET, EXTENDED RELEASE ORAL
Qty: 120 TABLET | Refills: 0 | OUTPATIENT
Start: 2022-03-21

## 2022-04-07 ENCOUNTER — HOSPITAL ENCOUNTER (EMERGENCY)
Age: 37
Discharge: LEFT BEFORE DISCHARGE | End: 2022-04-07

## 2022-04-07 VITALS
DIASTOLIC BLOOD PRESSURE: 89 MMHG | TEMPERATURE: 97.9 F | OXYGEN SATURATION: 100 % | RESPIRATION RATE: 18 BRPM | HEART RATE: 116 BPM | SYSTOLIC BLOOD PRESSURE: 153 MMHG

## 2022-04-07 ASSESSMENT — PAIN DESCRIPTION - FREQUENCY: FREQUENCY: CONTINUOUS

## 2022-04-07 ASSESSMENT — PAIN DESCRIPTION - DESCRIPTORS: DESCRIPTORS: DISCOMFORT

## 2022-04-07 ASSESSMENT — PAIN DESCRIPTION - PAIN TYPE: TYPE: ACUTE PAIN

## 2022-04-07 ASSESSMENT — PAIN - FUNCTIONAL ASSESSMENT: PAIN_FUNCTIONAL_ASSESSMENT: 0-10

## 2022-04-07 ASSESSMENT — PAIN SCALES - GENERAL: PAINLEVEL_OUTOF10: 10

## 2022-04-07 ASSESSMENT — PAIN DESCRIPTION - LOCATION: LOCATION: BUTTOCKS

## 2022-09-27 PROBLEM — Z12.4 PAP SMEAR FOR CERVICAL CANCER SCREENING: Status: ACTIVE | Noted: 2021-11-18

## 2022-09-27 PROBLEM — Z13.220 SCREENING FOR LIPID DISORDERS: Status: ACTIVE | Noted: 2021-11-18

## 2022-09-27 PROBLEM — Z72.89 OTHER PROBLEMS RELATED TO LIFESTYLE: Status: ACTIVE | Noted: 2022-09-27

## 2022-09-27 PROBLEM — Z23 NEED FOR PROPHYLACTIC VACCINATION AGAINST STREPTOCOCCUS PNEUMONIAE (PNEUMOCOCCUS): Status: ACTIVE | Noted: 2022-09-27

## 2022-09-27 PROBLEM — Z13.21 ENCOUNTER FOR VITAMIN DEFICIENCY SCREENING: Status: ACTIVE | Noted: 2021-11-18

## 2022-09-27 PROBLEM — E55.9 VITAMIN D DEFICIENCY: Status: ACTIVE | Noted: 2022-09-27

## 2022-09-27 PROBLEM — Z11.59 NEED FOR HEPATITIS C SCREENING TEST: Status: ACTIVE | Noted: 2022-09-27

## 2022-09-27 PROBLEM — R53.83 OTHER FATIGUE: Status: ACTIVE | Noted: 2022-09-27

## 2022-09-27 PROBLEM — Z13.31 ENCOUNTER FOR SCREENING FOR DEPRESSION: Status: ACTIVE | Noted: 2021-11-18

## 2022-10-13 ENCOUNTER — HOSPITAL ENCOUNTER (OUTPATIENT)
Age: 37
Discharge: HOME | End: 2022-10-13
Payer: MEDICARE

## 2022-10-13 DIAGNOSIS — Z11.59 NEED FOR HEPATITIS C SCREENING TEST: ICD-10-CM

## 2022-10-13 DIAGNOSIS — E55.9 VITAMIN D DEFICIENCY: ICD-10-CM

## 2022-10-13 DIAGNOSIS — R74.8 LIVER ENZYME ELEVATION: ICD-10-CM

## 2022-10-13 DIAGNOSIS — Z13.220 SCREENING FOR LIPID DISORDERS: ICD-10-CM

## 2022-10-13 DIAGNOSIS — R53.83 OTHER FATIGUE: ICD-10-CM

## 2022-10-13 LAB
ABSOLUTE EOS #: 0.1 K/UL (ref 0–0.44)
ABSOLUTE IMMATURE GRANULOCYTE: 0.03 K/UL (ref 0–0.3)
ABSOLUTE LYMPH #: 2.16 K/UL (ref 1.1–3.7)
ABSOLUTE MONO #: 0.53 K/UL (ref 0.1–1.2)
ALBUMIN SERPL-MCNC: 4.4 G/DL (ref 3.5–5.2)
ALBUMIN/GLOBULIN RATIO: 1.4 (ref 1–2.5)
ALP BLD-CCNC: 116 U/L (ref 35–104)
ALT SERPL-CCNC: 18 U/L (ref 5–33)
ANION GAP SERPL CALCULATED.3IONS-SCNC: 10 MMOL/L (ref 9–17)
AST SERPL-CCNC: 20 U/L
BASOPHILS # BLD: 1 % (ref 0–2)
BASOPHILS ABSOLUTE: 0.05 K/UL (ref 0–0.2)
BILIRUB SERPL-MCNC: 0.2 MG/DL (ref 0.3–1.2)
BUN BLDV-MCNC: 14 MG/DL (ref 6–20)
CALCIUM SERPL-MCNC: 8.6 MG/DL (ref 8.6–10.4)
CHLORIDE BLD-SCNC: 103 MMOL/L (ref 98–107)
CHOLESTEROL, FASTING: 249 MG/DL
CHOLESTEROL/HDL RATIO: 4.3
CO2: 24 MMOL/L (ref 20–31)
CREAT SERPL-MCNC: 0.65 MG/DL (ref 0.5–0.9)
EOSINOPHILS RELATIVE PERCENT: 2 % (ref 1–4)
GFR SERPL CREATININE-BSD FRML MDRD: >60 ML/MIN/1.73M2
GLUCOSE BLD-MCNC: 79 MG/DL (ref 70–99)
HCT VFR BLD CALC: 42.6 % (ref 36.3–47.1)
HDLC SERPL-MCNC: 58 MG/DL
HEMOGLOBIN: 14.5 G/DL (ref 11.9–15.1)
HEPATITIS C ANTIBODY: NONREACTIVE
IMMATURE GRANULOCYTES: 1 %
LDL CHOLESTEROL: 174 MG/DL (ref 0–130)
LYMPHOCYTES # BLD: 39 % (ref 24–43)
MCH RBC QN AUTO: 31.5 PG (ref 25.2–33.5)
MCHC RBC AUTO-ENTMCNC: 34 G/DL (ref 28.4–34.8)
MCV RBC AUTO: 92.4 FL (ref 82.6–102.9)
MONOCYTES # BLD: 10 % (ref 3–12)
NRBC AUTOMATED: 0 PER 100 WBC
PDW BLD-RTO: 12.8 % (ref 11.8–14.4)
PLATELET # BLD: 258 K/UL (ref 138–453)
PMV BLD AUTO: 9.6 FL (ref 8.1–13.5)
POTASSIUM SERPL-SCNC: 4 MMOL/L (ref 3.7–5.3)
RBC # BLD: 4.61 M/UL (ref 3.95–5.11)
SEG NEUTROPHILS: 47 % (ref 36–65)
SEGMENTED NEUTROPHILS ABSOLUTE COUNT: 2.64 K/UL (ref 1.5–8.1)
SODIUM BLD-SCNC: 137 MMOL/L (ref 135–144)
TOTAL PROTEIN: 7.6 G/DL (ref 6.4–8.3)
TRIGLYCERIDE, FASTING: 85 MG/DL
TSH SERPL DL<=0.05 MIU/L-ACNC: 2.71 UIU/ML (ref 0.3–5)
VITAMIN D 25-HYDROXY: 11.6 NG/ML
WBC # BLD: 5.5 K/UL (ref 3.5–11.3)

## 2022-10-13 PROCEDURE — 85025 COMPLETE CBC W/AUTO DIFF WBC: CPT

## 2022-10-13 PROCEDURE — 82306 VITAMIN D 25 HYDROXY: CPT

## 2022-10-13 PROCEDURE — 86803 HEPATITIS C AB TEST: CPT

## 2022-10-13 PROCEDURE — 36415 COLL VENOUS BLD VENIPUNCTURE: CPT

## 2022-10-13 PROCEDURE — 80053 COMPREHEN METABOLIC PANEL: CPT

## 2022-10-13 PROCEDURE — 80061 LIPID PANEL: CPT

## 2022-10-13 PROCEDURE — 84443 ASSAY THYROID STIM HORMONE: CPT

## 2022-10-19 PROBLEM — I20.8 STABLE ANGINA PECTORIS (HCC): Status: ACTIVE | Noted: 2022-10-19

## 2022-10-19 PROBLEM — E78.5 DYSLIPIDEMIA: Status: ACTIVE | Noted: 2022-10-19

## 2022-10-19 PROBLEM — I20.89 STABLE ANGINA PECTORIS: Status: ACTIVE | Noted: 2022-10-19

## 2022-10-25 ENCOUNTER — HOSPITAL ENCOUNTER (OUTPATIENT)
Dept: GENERAL RADIOLOGY | Age: 37
Discharge: HOME | End: 2022-10-27
Payer: MEDICARE

## 2022-10-25 ENCOUNTER — HOSPITAL ENCOUNTER (OUTPATIENT)
Age: 37
Discharge: HOME | End: 2022-10-27
Payer: MEDICARE

## 2022-10-25 ENCOUNTER — HOSPITAL ENCOUNTER (OUTPATIENT)
Age: 37
Discharge: HOME | End: 2022-10-25
Payer: MEDICARE

## 2022-10-25 DIAGNOSIS — Z79.899 ON STATIN THERAPY: ICD-10-CM

## 2022-10-25 DIAGNOSIS — I20.8 STABLE ANGINA PECTORIS (HCC): ICD-10-CM

## 2022-10-25 DIAGNOSIS — K59.01 SLOW TRANSIT CONSTIPATION: ICD-10-CM

## 2022-10-25 LAB
ALBUMIN SERPL-MCNC: 4.1 G/DL (ref 3.5–5.2)
ALBUMIN/GLOBULIN RATIO: 1.3 (ref 1–2.5)
ALP BLD-CCNC: 97 U/L (ref 35–104)
ALT SERPL-CCNC: 13 U/L (ref 5–33)
ANION GAP SERPL CALCULATED.3IONS-SCNC: 6 MMOL/L (ref 9–17)
AST SERPL-CCNC: 15 U/L
BILIRUB SERPL-MCNC: 0.2 MG/DL (ref 0.3–1.2)
BUN BLDV-MCNC: 12 MG/DL (ref 6–20)
CALCIUM SERPL-MCNC: 8.3 MG/DL (ref 8.6–10.4)
CHLORIDE BLD-SCNC: 105 MMOL/L (ref 98–107)
CO2: 26 MMOL/L (ref 20–31)
CREAT SERPL-MCNC: 0.57 MG/DL (ref 0.5–0.9)
GFR SERPL CREATININE-BSD FRML MDRD: >60 ML/MIN/1.73M2
GLUCOSE FASTING: 83 MG/DL (ref 70–99)
POTASSIUM SERPL-SCNC: 4 MMOL/L (ref 3.7–5.3)
SODIUM BLD-SCNC: 137 MMOL/L (ref 135–144)
TOTAL PROTEIN: 7.2 G/DL (ref 6.4–8.3)

## 2022-10-25 PROCEDURE — 74018 RADEX ABDOMEN 1 VIEW: CPT

## 2022-10-25 PROCEDURE — 71046 X-RAY EXAM CHEST 2 VIEWS: CPT

## 2022-10-25 PROCEDURE — 80053 COMPREHEN METABOLIC PANEL: CPT

## 2022-10-25 PROCEDURE — 36415 COLL VENOUS BLD VENIPUNCTURE: CPT

## 2022-10-27 PROBLEM — Z12.4 CERVICAL CANCER SCREENING: Status: RESOLVED | Noted: 2021-11-18 | Resolved: 2022-10-27

## 2022-10-27 PROBLEM — Z11.59 NEED FOR HEPATITIS C SCREENING TEST: Status: RESOLVED | Noted: 2022-09-27 | Resolved: 2022-10-27

## 2022-11-03 PROBLEM — M25.552 ACUTE HIP PAIN, LEFT: Status: RESOLVED | Noted: 2021-10-04 | Resolved: 2022-11-03

## 2022-12-01 PROBLEM — M54.12 CERVICAL RADICULOPATHY: Status: ACTIVE | Noted: 2022-12-01

## 2022-12-05 ENCOUNTER — HOSPITAL ENCOUNTER (OUTPATIENT)
Age: 37
Discharge: HOME | End: 2022-12-07
Payer: MEDICARE

## 2022-12-05 ENCOUNTER — HOSPITAL ENCOUNTER (OUTPATIENT)
Dept: GENERAL RADIOLOGY | Age: 37
Discharge: HOME | End: 2022-12-07
Payer: MEDICARE

## 2022-12-05 DIAGNOSIS — M54.12 CERVICAL RADICULOPATHY: ICD-10-CM

## 2022-12-05 PROCEDURE — 72040 X-RAY EXAM NECK SPINE 2-3 VW: CPT

## 2023-04-25 ENCOUNTER — HOSPITAL ENCOUNTER (OUTPATIENT)
Age: 38
Discharge: HOME | End: 2023-04-25
Payer: MEDICARE

## 2023-04-25 DIAGNOSIS — E55.9 VITAMIN D DEFICIENCY: ICD-10-CM

## 2023-04-25 LAB
25(OH)D3 SERPL-MCNC: 103.2 NG/ML
ALBUMIN SERPL-MCNC: 4.4 G/DL (ref 3.5–5.2)
ALBUMIN/GLOBULIN RATIO: 1.3 (ref 1–2.5)
ALP SERPL-CCNC: 108 U/L (ref 35–104)
ALT SERPL-CCNC: 18 U/L (ref 5–33)
ANION GAP SERPL CALCULATED.3IONS-SCNC: 12 MMOL/L (ref 9–17)
AST SERPL-CCNC: 16 U/L
BILIRUB SERPL-MCNC: 0.3 MG/DL (ref 0.3–1.2)
BUN SERPL-MCNC: 15 MG/DL (ref 6–20)
CALCIUM SERPL-MCNC: 9.1 MG/DL (ref 8.6–10.4)
CHLORIDE SERPL-SCNC: 105 MMOL/L (ref 98–107)
CHOLEST SERPL-MCNC: 185 MG/DL
CHOLESTEROL/HDL RATIO: 3.2
CO2 SERPL-SCNC: 24 MMOL/L (ref 20–31)
CREAT SERPL-MCNC: 0.71 MG/DL (ref 0.5–0.9)
GFR SERPL CREATININE-BSD FRML MDRD: >60 ML/MIN/1.73M2
GLUCOSE SERPL-MCNC: 88 MG/DL (ref 70–99)
HDLC SERPL-MCNC: 57 MG/DL
LDLC SERPL CALC-MCNC: 106 MG/DL (ref 0–130)
POTASSIUM SERPL-SCNC: 4.3 MMOL/L (ref 3.7–5.3)
PROT SERPL-MCNC: 7.7 G/DL (ref 6.4–8.3)
SODIUM SERPL-SCNC: 141 MMOL/L (ref 135–144)
TRIGL SERPL-MCNC: 110 MG/DL

## 2023-04-25 PROCEDURE — 82306 VITAMIN D 25 HYDROXY: CPT

## 2023-04-25 PROCEDURE — 80061 LIPID PANEL: CPT

## 2023-04-25 PROCEDURE — 36415 COLL VENOUS BLD VENIPUNCTURE: CPT

## 2023-04-25 PROCEDURE — 80053 COMPREHEN METABOLIC PANEL: CPT

## 2023-06-08 DIAGNOSIS — M25.552 HIP PAIN, LEFT: Primary | ICD-10-CM

## 2023-08-12 ENCOUNTER — HOSPITAL ENCOUNTER (OUTPATIENT)
Age: 38
Discharge: HOME | End: 2023-08-12
Payer: MEDICARE

## 2023-08-12 DIAGNOSIS — E55.9 VITAMIN D DEFICIENCY: ICD-10-CM

## 2023-08-12 LAB — 25(OH)D3 SERPL-MCNC: 22.7 NG/ML

## 2023-08-12 PROCEDURE — 36415 COLL VENOUS BLD VENIPUNCTURE: CPT

## 2023-08-12 PROCEDURE — 82306 VITAMIN D 25 HYDROXY: CPT

## 2023-09-12 ENCOUNTER — HOSPITAL ENCOUNTER (OUTPATIENT)
Dept: PREADMISSION TESTING | Age: 38
Discharge: HOME | End: 2023-09-16
Payer: MEDICARE

## 2023-09-12 VITALS
TEMPERATURE: 97.7 F | DIASTOLIC BLOOD PRESSURE: 87 MMHG | HEIGHT: 62 IN | SYSTOLIC BLOOD PRESSURE: 128 MMHG | OXYGEN SATURATION: 100 % | BODY MASS INDEX: 23.21 KG/M2 | WEIGHT: 126.1 LBS | RESPIRATION RATE: 16 BRPM | HEART RATE: 100 BPM

## 2023-09-12 LAB
ANION GAP SERPL CALCULATED.3IONS-SCNC: 9 MMOL/L (ref 9–17)
BUN SERPL-MCNC: 16 MG/DL (ref 6–20)
BUN/CREAT SERPL: 27 (ref 9–20)
CALCIUM SERPL-MCNC: 8.3 MG/DL (ref 8.6–10.4)
CHLORIDE SERPL-SCNC: 106 MMOL/L (ref 98–107)
CO2 SERPL-SCNC: 24 MMOL/L (ref 20–31)
CREAT SERPL-MCNC: 0.6 MG/DL (ref 0.5–0.9)
ERYTHROCYTE [DISTWIDTH] IN BLOOD BY AUTOMATED COUNT: 12.3 % (ref 11.8–14.4)
GFR SERPL CREATININE-BSD FRML MDRD: >60 ML/MIN/1.73M2
GLUCOSE SERPL-MCNC: 81 MG/DL (ref 70–99)
HCT VFR BLD AUTO: 41.8 % (ref 36.3–47.1)
HGB BLD-MCNC: 14.2 G/DL (ref 11.9–15.1)
MCH RBC QN AUTO: 31.5 PG (ref 25.2–33.5)
MCHC RBC AUTO-ENTMCNC: 34 G/DL (ref 28.4–34.8)
MCV RBC AUTO: 92.7 FL (ref 82.6–102.9)
NRBC BLD-RTO: 0 PER 100 WBC
PLATELET # BLD AUTO: 257 K/UL (ref 138–453)
PMV BLD AUTO: 8.7 FL (ref 8.1–13.5)
POTASSIUM SERPL-SCNC: 4.1 MMOL/L (ref 3.7–5.3)
RBC # BLD AUTO: 4.51 M/UL (ref 3.95–5.11)
SODIUM SERPL-SCNC: 139 MMOL/L (ref 135–144)
WBC OTHER # BLD: 4.6 K/UL (ref 3.5–11.3)

## 2023-09-12 PROCEDURE — 80048 BASIC METABOLIC PNL TOTAL CA: CPT

## 2023-09-12 PROCEDURE — 93005 ELECTROCARDIOGRAM TRACING: CPT | Performed by: ANESTHESIOLOGY

## 2023-09-12 PROCEDURE — 36415 COLL VENOUS BLD VENIPUNCTURE: CPT

## 2023-09-12 PROCEDURE — 85027 COMPLETE CBC AUTOMATED: CPT

## 2023-09-12 RX ORDER — SODIUM CHLORIDE, SODIUM LACTATE, POTASSIUM CHLORIDE, CALCIUM CHLORIDE 600; 310; 30; 20 MG/100ML; MG/100ML; MG/100ML; MG/100ML
INJECTION, SOLUTION INTRAVENOUS CONTINUOUS
OUTPATIENT
Start: 2023-09-26

## 2023-09-12 ASSESSMENT — PAIN SCALES - GENERAL: PAINLEVEL_OUTOF10: 7

## 2023-09-12 ASSESSMENT — PAIN DESCRIPTION - LOCATION: LOCATION: HIP

## 2023-09-12 ASSESSMENT — PAIN DESCRIPTION - ORIENTATION: ORIENTATION: LEFT

## 2023-09-12 ASSESSMENT — PAIN DESCRIPTION - DESCRIPTORS: DESCRIPTORS: ACHING

## 2023-09-12 NOTE — H&P
have been drawn or resulted since that time. Imaging/Diagnostics:    MRI PELVIS WO CONTRAST  Order: [id number]  Status: Final result     Visible to patient: Yes (seen)     Next appt: 10/20/2023 at 01:00 PM in [address] [place] [address] Romayne Knows, MD)     Dx: Hip pain, left     0 Result Notes  Details    Reading Physician Reading Date Result Priority   Rosemarie Malave MD  [phone] 2/24/2022      Narrative & Impression  EXAMINATION:  MRI OF THE PELVIS WITHOUT CONTRAST, 2/23/2022 6:01 pm     TECHNIQUE:  Multiplanar multisequence MRI of the pelvis was performed without the  administration of intravenous contrast.     COMPARISON:  None     HISTORY:  ORDERING SYSTEM PROVIDED HISTORY: Hip pain, left  TECHNOLOGIST PROVIDED HISTORY:  Due to pain in anterior superior illiac crest  Is the patient pregnant?->No  Reason for Exam: L ASIS pain since Sept 2021. FINDINGS:  Intrapelvic contents: No acute abnormality of the visualized intrapelvic  contents. Normal appearance of the uterus and ovaries for patient age. Trace volume of free fluid within the pelvis is likely physiologic in a  patient of this age. No lymphadenopathy. SOFT TISSUES: No focal soft tissue swelling. No focal soft tissue collection. Muscles/tendons: Normal muscle bulk for patient age. No intramuscular edema. There is mild asymmetric thickening with increased signal of the proximal  left sartorius tendon at the anterior superior iliac spine, for example on  series 3, image 12. The bilateral hamstring and gluteal cuff tendons are  intact. There is mild gluteal cuff tendinosis bilaterally at the gluteus  minimus tendon. The iliopsoas and rectus femoris tendons are normal in  appearance. Normal appearance of the rectus abdominus/abductor aponeurosis. No bursitis. BONE MARROW: No acute osseous abnormality or suspicious osseous lesion.   Mild  loss of T1 signal within marrow of the pelvis and visualized lumbar spine is  favored secondary to

## 2023-09-13 LAB
EKG ATRIAL RATE: 94 BPM
EKG P AXIS: 76 DEGREES
EKG P-R INTERVAL: 158 MS
EKG Q-T INTERVAL: 346 MS
EKG QRS DURATION: 76 MS
EKG QTC CALCULATION (BAZETT): 432 MS
EKG R AXIS: 103 DEGREES
EKG T AXIS: 56 DEGREES
EKG VENTRICULAR RATE: 94 BPM

## 2023-09-13 NOTE — FLOWSHEET NOTE
09/13/23 1419   Anesthesia PAT Clearance   Anesthesia Review Status Anes has reviewed patient for surgery   Is the patient cleared?  Patient is cleared for surgery  (Dr. Adonay Johnson reviewed EKG and H&P, no further intervention needed at this time.)

## 2023-09-26 ENCOUNTER — ANESTHESIA EVENT (OUTPATIENT)
Dept: OPERATING ROOM | Age: 38
End: 2023-09-26
Payer: MEDICARE

## 2023-09-26 ENCOUNTER — HOSPITAL ENCOUNTER (OUTPATIENT)
Age: 38
Setting detail: OUTPATIENT SURGERY
Discharge: HOME | End: 2023-09-26
Attending: ORTHOPAEDIC SURGERY | Admitting: ORTHOPAEDIC SURGERY
Payer: MEDICARE

## 2023-09-26 ENCOUNTER — ANESTHESIA (OUTPATIENT)
Dept: OPERATING ROOM | Age: 38
End: 2023-09-26
Payer: MEDICARE

## 2023-09-26 ENCOUNTER — APPOINTMENT (OUTPATIENT)
Dept: GENERAL RADIOLOGY | Age: 38
End: 2023-09-26
Attending: ORTHOPAEDIC SURGERY
Payer: MEDICARE

## 2023-09-26 VITALS
DIASTOLIC BLOOD PRESSURE: 79 MMHG | HEART RATE: 105 BPM | OXYGEN SATURATION: 96 % | WEIGHT: 126 LBS | SYSTOLIC BLOOD PRESSURE: 128 MMHG | TEMPERATURE: 97.3 F | HEIGHT: 62 IN | BODY MASS INDEX: 23.19 KG/M2 | RESPIRATION RATE: 16 BRPM

## 2023-09-26 DIAGNOSIS — G89.18 POST-OP PAIN: Primary | ICD-10-CM

## 2023-09-26 LAB — HCG, PREGNANCY URINE (POC): NEGATIVE

## 2023-09-26 PROCEDURE — 6370000000 HC RX 637 (ALT 250 FOR IP): Performed by: ANESTHESIOLOGY

## 2023-09-26 PROCEDURE — 6360000002 HC RX W HCPCS: Performed by: NURSE ANESTHETIST, CERTIFIED REGISTERED

## 2023-09-26 PROCEDURE — 6360000002 HC RX W HCPCS: Performed by: ORTHOPAEDIC SURGERY

## 2023-09-26 PROCEDURE — 6360000002 HC RX W HCPCS: Performed by: ANESTHESIOLOGY

## 2023-09-26 PROCEDURE — 7100000010 HC PHASE II RECOVERY - FIRST 15 MIN: Performed by: ORTHOPAEDIC SURGERY

## 2023-09-26 PROCEDURE — C1769 GUIDE WIRE: HCPCS | Performed by: ORTHOPAEDIC SURGERY

## 2023-09-26 PROCEDURE — 7100000001 HC PACU RECOVERY - ADDTL 15 MIN: Performed by: ORTHOPAEDIC SURGERY

## 2023-09-26 PROCEDURE — 7100000000 HC PACU RECOVERY - FIRST 15 MIN: Performed by: ORTHOPAEDIC SURGERY

## 2023-09-26 PROCEDURE — 81025 URINE PREGNANCY TEST: CPT

## 2023-09-26 PROCEDURE — 2720000010 HC SURG SUPPLY STERILE: Performed by: ORTHOPAEDIC SURGERY

## 2023-09-26 PROCEDURE — 3700000001 HC ADD 15 MINUTES (ANESTHESIA): Performed by: ORTHOPAEDIC SURGERY

## 2023-09-26 PROCEDURE — 7100000011 HC PHASE II RECOVERY - ADDTL 15 MIN: Performed by: ORTHOPAEDIC SURGERY

## 2023-09-26 PROCEDURE — 2500000003 HC RX 250 WO HCPCS: Performed by: NURSE ANESTHETIST, CERTIFIED REGISTERED

## 2023-09-26 PROCEDURE — 76942 ECHO GUIDE FOR BIOPSY: CPT | Performed by: ANESTHESIOLOGY

## 2023-09-26 PROCEDURE — 3600000003 HC SURGERY LEVEL 3 BASE: Performed by: ORTHOPAEDIC SURGERY

## 2023-09-26 PROCEDURE — 2580000003 HC RX 258: Performed by: ORTHOPAEDIC SURGERY

## 2023-09-26 PROCEDURE — 2709999900 HC NON-CHARGEABLE SUPPLY: Performed by: ORTHOPAEDIC SURGERY

## 2023-09-26 PROCEDURE — 3600000013 HC SURGERY LEVEL 3 ADDTL 15MIN: Performed by: ORTHOPAEDIC SURGERY

## 2023-09-26 PROCEDURE — 3700000000 HC ANESTHESIA ATTENDED CARE: Performed by: ORTHOPAEDIC SURGERY

## 2023-09-26 RX ORDER — SODIUM CHLORIDE 0.9 % (FLUSH) 0.9 %
5-40 SYRINGE (ML) INJECTION PRN
Status: DISCONTINUED | OUTPATIENT
Start: 2023-09-26 | End: 2023-09-26 | Stop reason: HOSPADM

## 2023-09-26 RX ORDER — SODIUM CHLORIDE 9 MG/ML
INJECTION, SOLUTION INTRAVENOUS CONTINUOUS
Status: DISCONTINUED | OUTPATIENT
Start: 2023-09-26 | End: 2023-09-26 | Stop reason: HOSPADM

## 2023-09-26 RX ORDER — OXYCODONE HYDROCHLORIDE AND ACETAMINOPHEN 5; 325 MG/1; MG/1
1 TABLET ORAL ONCE
Status: COMPLETED | OUTPATIENT
Start: 2023-09-26 | End: 2023-09-26

## 2023-09-26 RX ORDER — BUPIVACAINE HYDROCHLORIDE 2.5 MG/ML
INJECTION, SOLUTION EPIDURAL; INFILTRATION; INTRACAUDAL
Status: COMPLETED | OUTPATIENT
Start: 2023-09-26 | End: 2023-09-26

## 2023-09-26 RX ORDER — ROCURONIUM BROMIDE 10 MG/ML
INJECTION, SOLUTION INTRAVENOUS PRN
Status: DISCONTINUED | OUTPATIENT
Start: 2023-09-26 | End: 2023-09-26 | Stop reason: SDUPTHER

## 2023-09-26 RX ORDER — LIDOCAINE HYDROCHLORIDE 20 MG/ML
INJECTION, SOLUTION EPIDURAL; INFILTRATION; INTRACAUDAL; PERINEURAL PRN
Status: DISCONTINUED | OUTPATIENT
Start: 2023-09-26 | End: 2023-09-26 | Stop reason: SDUPTHER

## 2023-09-26 RX ORDER — PROPOFOL 10 MG/ML
INJECTION, EMULSION INTRAVENOUS PRN
Status: DISCONTINUED | OUTPATIENT
Start: 2023-09-26 | End: 2023-09-26 | Stop reason: SDUPTHER

## 2023-09-26 RX ORDER — LIDOCAINE HYDROCHLORIDE 10 MG/ML
1 INJECTION, SOLUTION EPIDURAL; INFILTRATION; INTRACAUDAL; PERINEURAL
Status: DISCONTINUED | OUTPATIENT
Start: 2023-09-27 | End: 2023-09-26 | Stop reason: HOSPADM

## 2023-09-26 RX ORDER — MAGNESIUM HYDROXIDE 1200 MG/15ML
LIQUID ORAL CONTINUOUS PRN
Status: COMPLETED | OUTPATIENT
Start: 2023-09-26 | End: 2023-09-26

## 2023-09-26 RX ORDER — ONDANSETRON 2 MG/ML
INJECTION INTRAMUSCULAR; INTRAVENOUS PRN
Status: DISCONTINUED | OUTPATIENT
Start: 2023-09-26 | End: 2023-09-26 | Stop reason: SDUPTHER

## 2023-09-26 RX ORDER — SODIUM CHLORIDE 0.9 % (FLUSH) 0.9 %
5-40 SYRINGE (ML) INJECTION EVERY 12 HOURS SCHEDULED
Status: DISCONTINUED | OUTPATIENT
Start: 2023-09-26 | End: 2023-09-26 | Stop reason: HOSPADM

## 2023-09-26 RX ORDER — FENTANYL CITRATE 50 UG/ML
INJECTION, SOLUTION INTRAMUSCULAR; INTRAVENOUS PRN
Status: DISCONTINUED | OUTPATIENT
Start: 2023-09-26 | End: 2023-09-26 | Stop reason: SDUPTHER

## 2023-09-26 RX ORDER — PHENYLEPHRINE HCL IN 0.9% NACL 1 MG/10 ML
SYRINGE (ML) INTRAVENOUS PRN
Status: DISCONTINUED | OUTPATIENT
Start: 2023-09-26 | End: 2023-09-26 | Stop reason: SDUPTHER

## 2023-09-26 RX ORDER — SCOLOPAMINE TRANSDERMAL SYSTEM 1 MG/1
1 PATCH, EXTENDED RELEASE TRANSDERMAL ONCE
Status: DISCONTINUED | OUTPATIENT
Start: 2023-09-26 | End: 2023-09-26 | Stop reason: HOSPADM

## 2023-09-26 RX ORDER — FENTANYL CITRATE 50 UG/ML
25 INJECTION, SOLUTION INTRAMUSCULAR; INTRAVENOUS EVERY 5 MIN PRN
Status: DISCONTINUED | OUTPATIENT
Start: 2023-09-26 | End: 2023-09-26 | Stop reason: HOSPADM

## 2023-09-26 RX ORDER — MIDAZOLAM HYDROCHLORIDE 1 MG/ML
2 INJECTION INTRAMUSCULAR; INTRAVENOUS ONCE
Status: COMPLETED | OUTPATIENT
Start: 2023-09-26 | End: 2023-09-26

## 2023-09-26 RX ORDER — SODIUM CHLORIDE, SODIUM LACTATE, POTASSIUM CHLORIDE, CALCIUM CHLORIDE 600; 310; 30; 20 MG/100ML; MG/100ML; MG/100ML; MG/100ML
INJECTION, SOLUTION INTRAVENOUS CONTINUOUS
Status: DISCONTINUED | OUTPATIENT
Start: 2023-09-26 | End: 2023-09-26 | Stop reason: HOSPADM

## 2023-09-26 RX ORDER — ASPIRIN 81 MG/1
81 TABLET ORAL 2 TIMES DAILY
Qty: 60 TABLET | Refills: 0 | Status: SHIPPED | OUTPATIENT
Start: 2023-09-26 | End: 2023-10-26

## 2023-09-26 RX ORDER — ROPIVACAINE HYDROCHLORIDE 2 MG/ML
INJECTION, SOLUTION EPIDURAL; INFILTRATION; PERINEURAL
Status: COMPLETED | OUTPATIENT
Start: 2023-09-26 | End: 2023-09-26

## 2023-09-26 RX ORDER — SODIUM CHLORIDE 9 MG/ML
INJECTION, SOLUTION INTRAVENOUS PRN
Status: DISCONTINUED | OUTPATIENT
Start: 2023-09-26 | End: 2023-09-26 | Stop reason: HOSPADM

## 2023-09-26 RX ORDER — GLYCOPYRROLATE 0.2 MG/ML
INJECTION INTRAMUSCULAR; INTRAVENOUS PRN
Status: DISCONTINUED | OUTPATIENT
Start: 2023-09-26 | End: 2023-09-26 | Stop reason: SDUPTHER

## 2023-09-26 RX ORDER — ONDANSETRON 2 MG/ML
4 INJECTION INTRAMUSCULAR; INTRAVENOUS
Status: DISCONTINUED | OUTPATIENT
Start: 2023-09-26 | End: 2023-09-26 | Stop reason: HOSPADM

## 2023-09-26 RX ORDER — KETAMINE HCL IN NACL, ISO-OSM 100MG/10ML
SYRINGE (ML) INJECTION PRN
Status: DISCONTINUED | OUTPATIENT
Start: 2023-09-26 | End: 2023-09-26 | Stop reason: SDUPTHER

## 2023-09-26 RX ORDER — HYDROMORPHONE HYDROCHLORIDE 1 MG/ML
0.5 INJECTION, SOLUTION INTRAMUSCULAR; INTRAVENOUS; SUBCUTANEOUS EVERY 5 MIN PRN
Status: DISCONTINUED | OUTPATIENT
Start: 2023-09-26 | End: 2023-09-26 | Stop reason: HOSPADM

## 2023-09-26 RX ORDER — OXYCODONE HYDROCHLORIDE AND ACETAMINOPHEN 5; 325 MG/1; MG/1
1 TABLET ORAL EVERY 6 HOURS PRN
Qty: 28 TABLET | Refills: 0 | Status: SHIPPED | OUTPATIENT
Start: 2023-09-26 | End: 2023-10-03

## 2023-09-26 RX ORDER — DEXAMETHASONE SODIUM PHOSPHATE 10 MG/ML
INJECTION, SOLUTION INTRAMUSCULAR; INTRAVENOUS PRN
Status: DISCONTINUED | OUTPATIENT
Start: 2023-09-26 | End: 2023-09-26 | Stop reason: SDUPTHER

## 2023-09-26 RX ADMIN — DEXAMETHASONE SODIUM PHOSPHATE 10 MG: 10 INJECTION, SOLUTION INTRAMUSCULAR; INTRAVENOUS at 07:48

## 2023-09-26 RX ADMIN — Medication 2000 MG: at 11:41

## 2023-09-26 RX ADMIN — HYDROMORPHONE HYDROCHLORIDE 0.5 MG: 1 INJECTION, SOLUTION INTRAMUSCULAR; INTRAVENOUS; SUBCUTANEOUS at 12:28

## 2023-09-26 RX ADMIN — SODIUM CHLORIDE, POTASSIUM CHLORIDE, SODIUM LACTATE AND CALCIUM CHLORIDE: 600; 310; 30; 20 INJECTION, SOLUTION INTRAVENOUS at 06:23

## 2023-09-26 RX ADMIN — ROCURONIUM BROMIDE 40 MG: 10 INJECTION, SOLUTION INTRAVENOUS at 08:28

## 2023-09-26 RX ADMIN — Medication 2000 MG: at 07:47

## 2023-09-26 RX ADMIN — ROCURONIUM BROMIDE 20 MG: 10 INJECTION, SOLUTION INTRAVENOUS at 11:08

## 2023-09-26 RX ADMIN — GLYCOPYRROLATE 0.1 MG: 0.2 INJECTION INTRAMUSCULAR; INTRAVENOUS at 07:40

## 2023-09-26 RX ADMIN — ROPIVACAINE HYDROCHLORIDE 30 ML: 2 INJECTION, SOLUTION EPIDURAL; INFILTRATION at 07:21

## 2023-09-26 RX ADMIN — Medication 150 MCG: at 08:32

## 2023-09-26 RX ADMIN — PROPOFOL 25 MCG/KG/MIN: 10 INJECTION, EMULSION INTRAVENOUS at 07:55

## 2023-09-26 RX ADMIN — Medication 100 MCG: at 08:51

## 2023-09-26 RX ADMIN — ROCURONIUM BROMIDE 40 MG: 10 INJECTION, SOLUTION INTRAVENOUS at 07:41

## 2023-09-26 RX ADMIN — HYDROMORPHONE HYDROCHLORIDE 0.5 MG: 1 INJECTION, SOLUTION INTRAMUSCULAR; INTRAVENOUS; SUBCUTANEOUS at 12:35

## 2023-09-26 RX ADMIN — Medication 100 MCG: at 11:30

## 2023-09-26 RX ADMIN — ONDANSETRON 4 MG: 2 INJECTION INTRAMUSCULAR; INTRAVENOUS at 07:51

## 2023-09-26 RX ADMIN — PROPOFOL 150 MG: 10 INJECTION, EMULSION INTRAVENOUS at 07:41

## 2023-09-26 RX ADMIN — BUPIVACAINE HYDROCHLORIDE 20 ML: 2.5 INJECTION, SOLUTION EPIDURAL; INFILTRATION; INTRACAUDAL; PERINEURAL at 07:21

## 2023-09-26 RX ADMIN — SUGAMMADEX 200 MG: 100 INJECTION, SOLUTION INTRAVENOUS at 11:46

## 2023-09-26 RX ADMIN — OXYCODONE AND ACETAMINOPHEN 1 TABLET: 5; 325 TABLET ORAL at 13:36

## 2023-09-26 RX ADMIN — ONDANSETRON 4 MG: 2 INJECTION INTRAMUSCULAR; INTRAVENOUS at 11:45

## 2023-09-26 RX ADMIN — Medication 10 MG: at 08:31

## 2023-09-26 RX ADMIN — FENTANYL CITRATE 50 MCG: 50 INJECTION INTRAMUSCULAR; INTRAVENOUS at 08:00

## 2023-09-26 RX ADMIN — Medication 10 MG: at 09:38

## 2023-09-26 RX ADMIN — MIDAZOLAM 2 MG: 1 INJECTION INTRAMUSCULAR; INTRAVENOUS at 07:22

## 2023-09-26 RX ADMIN — Medication 5 MG: at 11:08

## 2023-09-26 RX ADMIN — LIDOCAINE HYDROCHLORIDE 80 MG: 20 INJECTION, SOLUTION EPIDURAL; INFILTRATION; INTRACAUDAL; PERINEURAL at 07:41

## 2023-09-26 RX ADMIN — Medication 25 MG: at 07:51

## 2023-09-26 RX ADMIN — FENTANYL CITRATE 50 MCG: 50 INJECTION INTRAMUSCULAR; INTRAVENOUS at 07:40

## 2023-09-26 ASSESSMENT — PAIN SCALES - GENERAL
PAINLEVEL_OUTOF10: 4
PAINLEVEL_OUTOF10: 3
PAINLEVEL_OUTOF10: 3
PAINLEVEL_OUTOF10: 6
PAINLEVEL_OUTOF10: 3
PAINLEVEL_OUTOF10: 7
PAINLEVEL_OUTOF10: 3

## 2023-09-26 ASSESSMENT — PAIN DESCRIPTION - ORIENTATION
ORIENTATION: LEFT

## 2023-09-26 ASSESSMENT — PAIN DESCRIPTION - DESCRIPTORS
DESCRIPTORS: SHARP
DESCRIPTORS: ACHING

## 2023-09-26 ASSESSMENT — PAIN DESCRIPTION - LOCATION
LOCATION: HIP
LOCATION: HIP

## 2023-09-26 ASSESSMENT — PAIN - FUNCTIONAL ASSESSMENT: PAIN_FUNCTIONAL_ASSESSMENT: 0-10

## 2023-09-26 ASSESSMENT — LIFESTYLE VARIABLES: SMOKING_STATUS: 1

## 2023-09-26 NOTE — H&P
Interval H&P Note    Pt Name: Ugo Sagastume  MRN: [id number]  YOB: 1985  Date of evaluation: 9/26/2023      [x] I have reviewed in epic the H&P by Tania WILCOX dated 9/12/2023 attached below for an Interval History and Physical note. [x] I have examined  Ugo Sagastume, a 42 y.o. female. There are no changes to the patient who is scheduled for LEFT HIP ARTHROSCOPY, OSTEOCHONDROPLASTY AND IT BAND RELEASE by Dg Simental DO for Left hip impingement syndrome. The patient denies new health changes, fever, chills, wheezing, cough, increased SOB, chest pain, open sores or wounds. Denies hx diabetes. Last Meloxicam and Ibuprofen 9/18/2023. Vital signs: BP (!) 126/92   Pulse (!) 106   Resp 20   LMP 09/10/2023   SpO2 99%     Allergies:  Bactrim [sulfamethoxazole-trimethoprim] and Sulfamethoxazole    Medications:    Prior to Admission medications    Medication Sig Start Date End Date Taking?  Authorizing Provider   meloxicam (MOBIC) 7.5 MG tablet Take 1 tablet by mouth every morning 8/4/23   Victorino Diehl MD   amitriptyline (ELAVIL) 75 MG tablet Take 1 tablet by mouth nightly 8/4/23 11/2/23  Victorino Diehl MD   atorvastatin (LIPITOR) 10 MG tablet Take 1 tablet by mouth daily TAKE 1 TABLET BY MOUTH EVERY DAY 8/4/23 11/2/23  Victorino Diehl MD   diclofenac sodium (VOLTAREN) 1 % GEL Apply 4 g topically 4 times daily as needed for Pain 6/16/23   Mau Cuello MD   ibuprofen (ADVIL;MOTRIN) 800 MG tablet Take 1 tablet by mouth every 8 hours as needed for Pain 2/16/23   Victorino Diehl MD   diclofenac sodium (VOLTAREN) 1 % GEL Apply 2 g topically 2 times daily 12/1/22   Victorino Diehl MD   topiramate (TOPAMAX) 25 MG tablet TAKE 2 TABLETS BY MOUTH EVERY DAY 9/30/22   Historical Provider, MD   Erenumab-aooe (AIMOVIG) 140 MG/ML SOAJ Inject 140 mg into the skin every 30 days  Patient taking differently: Inject 140 mg into the skin every 30 days 6th of the month 7/15/22   Erik BUSBY

## 2023-09-26 NOTE — ANESTHESIA POSTPROCEDURE EVALUATION
Department of Anesthesiology  Postprocedure Note    Patient: Jennifer Phan  MRN: [id number]  YOB: 1985  Date of evaluation: 9/26/2023      Procedure Summary     Date: 09/26/23 Room / Location: [facility] - INPATIENT    Anesthesia Start: 8944 Anesthesia Stop: 1209    Procedure: LEFT HIP ARTHROSCOPY, OSTEOCHONDROPLASTY AND IT BAND RELEASE (Left: Hip) Diagnosis:       Left hip impingement syndrome      (Left hip impingement syndrome [M25.852])    Surgeons: Lisseth Johnson DO Responsible Provider: George Zavala MD    Anesthesia Type: General ASA Status: 3          Anesthesia Type: General    Abhinav Phase I: Abhinav Score: 9    Abhinav Phase II:        Anesthesia Post Evaluation    Patient location during evaluation: PACU  Patient participation: complete - patient participated  Level of consciousness: awake  Airway patency: patent  Nausea & Vomiting: no nausea  Complications: no  Cardiovascular status: blood pressure returned to baseline  Respiratory status: acceptable  Hydration status: euvolemic  Comments: Multimodal analgesia pain management as indicated by procedure  Multimodal analgesia pain management approach  Pain management: adequate

## 2023-09-26 NOTE — OP NOTE
brought in and the trochanteric ridge was identified in the mid lateral aspect plane with with localization of spinal needle. 4 cm distal and proximal to this nick incisions were made. The spinal needle was visualized and subcutaneously against the IT band. The IT band was split through the length of the incision from the distal to the proximal portal with an ArthroCare device. The bursa was quite thickened and was shaved from the extra-articular compartment. The vastus lateralis was identified. The gluteus medius was identified and probed. Hip was rotated there was no tears of the gluteus medius. The tight band of the IT band was also shaved anterior at the greater trochanter and posterior to create a terrie-shaped opening to prevent impingement of the tight IT band. At this time all instruments removed from the hip portals are closed with 3-0 nylon suture sterile dressing was placed. Patient was awakened department esthesia after the Phan was taken out and was stable.     Electronically signed by Gill Hernández DO on 9/26/2023 at 12:12 PM

## 2023-09-26 NOTE — ANESTHESIA PROCEDURE NOTES
Peripheral Block    Patient location during procedure: pre-op  Reason for block: procedure for pain, post-op pain management and at surgeon's request  Start time: 9/26/2023 7:21 AM  End time: 9/26/2023 7:25 AM  Staffing  Performed: anesthesiologist   Anesthesiologist: Catrachita Golden MD  Performed by: Catrachita Golden MD  Authorized by: Catrachita Golden MD    Preanesthetic Checklist  Completed: patient identified, IV checked, site marked, risks and benefits discussed, surgical/procedural consents, equipment checked, pre-op evaluation, timeout performed, anesthesia consent given, oxygen available, monitors applied/VS acknowledged, fire risk safety assessment completed and verbalized and blood product R/B/A discussed and consented  Peripheral Block   Patient position: supine  Prep: ChloraPrep  Provider prep: mask and sterile gloves  Patient monitoring: cardiac monitor, continuous pulse ox, continuous capnometry, frequent blood pressure checks, IV access, oxygen and responsive to questions  Block type: Fascia iliaca  Laterality: left  Injection technique: single-shot  Guidance: ultrasound guided  Local infiltration: lidocaine (8mg decadron)  Infiltration strength: 1 %  Local infiltration: lidocaine (8mg decadron)  Dose: 3 mL    Needle   Needle type: pencil-tip   Needle gauge: 20 G  Needle localization: ultrasound guidance  Assessment   Injection assessment: negative aspiration for heme, no paresthesia on injection, local visualized surrounding nerve on ultrasound and no intravascular symptoms  Paresthesia pain: none  Slow fractionated injection: yes  Hemodynamics: stable  Real-time US image taken/store: yes  Outcomes: patient tolerated procedure well and uncomplicated    Additional Notes  Preprocedure ready time 0721--needed st jody consent  Medications Administered  bupivacaine (MARCAINE) PF injection 0.25% - Perineural   20 mL - 9/26/2023 7:21:00 AM  ropivacaine (NAROPIN) injection 0.2% - Perineural   30 mL - 9/26/2023

## 2023-09-27 ENCOUNTER — TELEPHONE (OUTPATIENT)
Dept: ORTHOPEDIC SURGERY | Age: 38
End: 2023-09-27

## 2023-09-27 NOTE — TELEPHONE ENCOUNTER
[facility] called in regarding notes, face 2 face for patient walker and that patient was requesting walker with a seat. Phone number provided to Dr. Felix Last office.

## 2023-10-13 ENCOUNTER — HOSPITAL ENCOUNTER (OUTPATIENT)
Dept: PHYSICAL THERAPY | Facility: CLINIC | Age: 38
Setting detail: THERAPIES SERIES
Discharge: HOME | End: 2023-10-13
Payer: MEDICARE

## 2023-10-13 PROCEDURE — 97110 THERAPEUTIC EXERCISES: CPT

## 2023-10-13 PROCEDURE — 97161 PT EVAL LOW COMPLEX 20 MIN: CPT

## 2023-10-17 ENCOUNTER — HOSPITAL ENCOUNTER (OUTPATIENT)
Age: 38
Discharge: HOME | End: 2023-10-17
Payer: MEDICARE

## 2023-10-17 DIAGNOSIS — E83.51 HYPOCALCEMIA: ICD-10-CM

## 2023-10-17 DIAGNOSIS — E78.5 DYSLIPIDEMIA: ICD-10-CM

## 2023-10-17 LAB
ALBUMIN SERPL-MCNC: 4.4 G/DL (ref 3.5–5.2)
ALP SERPL-CCNC: 132 U/L (ref 35–104)
ALT SERPL-CCNC: 19 U/L (ref 5–33)
ANION GAP SERPL CALCULATED.3IONS-SCNC: 11 MMOL/L (ref 9–17)
AST SERPL-CCNC: 15 U/L
BILIRUB SERPL-MCNC: 0.2 MG/DL (ref 0.3–1.2)
BUN SERPL-MCNC: 16 MG/DL (ref 6–20)
BUN/CREAT SERPL: 27 (ref 9–20)
CALCIUM SERPL-MCNC: 8.6 MG/DL (ref 8.6–10.4)
CHLORIDE SERPL-SCNC: 103 MMOL/L (ref 98–107)
CO2 SERPL-SCNC: 23 MMOL/L (ref 20–31)
CREAT SERPL-MCNC: 0.6 MG/DL (ref 0.5–0.9)
GFR SERPL CREATININE-BSD FRML MDRD: >60 ML/MIN/1.73M2
GLUCOSE SERPL-MCNC: 85 MG/DL (ref 70–99)
POTASSIUM SERPL-SCNC: 3.8 MMOL/L (ref 3.7–5.3)
PROT SERPL-MCNC: 7.2 G/DL (ref 6.4–8.3)
SODIUM SERPL-SCNC: 137 MMOL/L (ref 135–144)

## 2023-10-17 PROCEDURE — 36415 COLL VENOUS BLD VENIPUNCTURE: CPT

## 2023-10-17 PROCEDURE — 80053 COMPREHEN METABOLIC PANEL: CPT

## 2023-10-20 ENCOUNTER — HOSPITAL ENCOUNTER (OUTPATIENT)
Dept: PHYSICAL THERAPY | Facility: CLINIC | Age: 38
Setting detail: THERAPIES SERIES
Discharge: HOME | End: 2023-10-20
Payer: MEDICARE

## 2023-10-20 PROCEDURE — 97110 THERAPEUTIC EXERCISES: CPT

## 2023-10-20 NOTE — FLOWSHEET NOTE
[x] [facility]  Outpatient Rehabilitation &  Therapy  [facility]   [address]   [place]: [phone]  F: [phone]      Physical Therapy Daily Treatment Note    Date:  10/20/2023  Patient Name:  Janneth Ponce    :  1985  MRN: [id number]  Physician: Dr Leonela Santiago: [employer] (Auth  20 VS 10/13/23-24)  Medical Diagnosis: LLE Hip pain, scope                   Rehab Codes: G89.18, M62.81 (Muscle Weakness), M62.9 (Disorder of Muscle), M79.1 (Myalgia), Z73.6  Onset date: 23                           Next Dr's appt. : 23  Visit# / total visits:    Cancels/No Shows: 0/0        Subjective:    Pain:  [x] Yes  [] No Location: LLE hip pain   Lateral/anterior    Pain Rating: (0-10 scale) 8/10  Pain altered Tx:  [] No  [] Yes  Action:  Comments: Pt reports soreness in the hip with activity, pain is 8/10 at arrival.       Objective:  Precautions: FFWB, post-op hip scope   Exercise     LLE Hip Scope  DOS 23 Reps/ Time Weight/ Level Comments             Nustep L1x10'                 HS belt stretch 30\"x3   HEP   Calf belt stretch 30\"x3   HEP   SAQ x20   HEP   LAQ x20   HEP   SLR x20   HEP   Heel slides  x20   HEP             Total Gym heel slides L20x20       Total Gym squats                Parallel bars         Slantboard calf stretch 30\"x3        Balance board L2x5'        4 way hip  x10  Green       Mini-squats      Lunges       Step up      Step down                                              Specific Instructions for next treatment: Progress exercises for HEP    Treatment Charges: Mins Units   []  Modalities     [x]  Ther Exercise 44 3   []  Manual Therapy     []  Ther Activities     []  Neuro Re-ed     []  Vasocompression     [] Gait     []  Other     Total Treatment time 44 3       Assessment: [x] Progressing toward goals. Patient reports adherence to HEP, has been working on daily exercises.  Therex as per flow sheet,

## 2023-10-23 ENCOUNTER — HOSPITAL ENCOUNTER (OUTPATIENT)
Dept: PHYSICAL THERAPY | Facility: CLINIC | Age: 38
Setting detail: THERAPIES SERIES
Discharge: HOME | End: 2023-10-23
Payer: MEDICARE

## 2023-10-23 PROCEDURE — 97110 THERAPEUTIC EXERCISES: CPT

## 2023-10-23 NOTE — FLOWSHEET NOTE
[x] [facility]  Outpatient Rehabilitation &  Therapy  [address]   [address]   [place]: [phone]  F: [phone]      Physical Therapy Daily Treatment Note    Date:  10/23/2023  Patient Name:  Pastor Soulier    :  1985  MRN: [id number]  Physician: Dr Yola Luo: [employer] (Auth  20 VS 10/13/23-24)  Medical Diagnosis: LLE Hip pain, scope                   Rehab Codes: G89.18, M62.81 (Muscle Weakness), M62.9 (Disorder of Muscle), M79.1 (Myalgia), Z73.6  Onset date: 23                           Next 's appt. : 23  Visit# / total visits:    Cancels/No Shows: 0/0        Subjective:    Pain:  [x] Yes  [] No Location: LLE hip pain,  Lateral/anterior          Pain Rating: (0-10 scale) 5/10  Pain altered Tx:  [] No  [] Yes  Action:  Comments: Pt reports soreness in the hip with activity, pain is 5/10 at arrival. She is starting back to work tomorrow for 4 hours. Objective:  Precautions: FFWB, post-op hip scope   Exercise     LLE Hip Scope  DOS 23 Reps/ Time Weight/ Level Comments             Nustep L1x10'                 HS belt stretch 30\"x3   HEP   Calf belt stretch 30\"x3   HEP   LAQ x20   HEP   SLR x20   HEP   Heel slides  x20   HEP   Clamshells x20 Left side               Total Gym heel raises L20x20       Total Gym squats  L20x20             Parallel bars         Slantboard calf stretch 30\"x3        Balance board L2x5'        4 way hip  x20  Green       Mini-squats 2x10     Lunges  2x10     Step up 2x10 4\"    Step down                                             Specific Instructions for next treatment: Progress exercises for HEP    Treatment Charges: Mins Units   []  Modalities     [x]  Ther Exercise 45 3   []  Manual Therapy     []  Ther Activities     []  Neuro Re-ed     []  Vasocompression     [] Gait     []  Other     Total Treatment time 45 3       Assessment: [x] Progressing toward goals.  Patient

## 2023-10-26 ENCOUNTER — HOSPITAL ENCOUNTER (OUTPATIENT)
Dept: PHYSICAL THERAPY | Facility: CLINIC | Age: 38
Setting detail: THERAPIES SERIES
Discharge: HOME | End: 2023-10-26
Payer: MEDICARE

## 2023-10-26 PROCEDURE — 97110 THERAPEUTIC EXERCISES: CPT

## 2023-10-26 NOTE — FLOWSHEET NOTE
[x] [facility]  Outpatient Rehabilitation &  Therapy  [address]   [address]   [place]: [phone]  F: [phone]      Physical Therapy Daily Treatment Note    Date:  10/26/2023  Patient Name:  Roby De Jesus    :  1985  MRN: [id number]  Physician: Dr Chai Oviedo: [employer] (Auth  20 VS 10/13/23-24)  Medical Diagnosis: LLE Hip pain, scope                   Rehab Codes: G89.18, M62.81 (Muscle Weakness), M62.9 (Disorder of Muscle), M79.1 (Myalgia), Z73.6  Onset date: 23                           Next 's appt. : 23    Visit# / total visits:    Cancels/No Shows: 0/0    Subjective:    Pain:  [x] Yes  [] No Location: LLE hip pain,  Lateral/anterior          Pain Rating: (0-10 scale) 5/10  Pain altered Tx:  [x] No  [] Yes  Action:  Comments: Patient arrived noting increased soreness and discomfort due to returning to work. Objective:  Precautions: FFWB, post-op hip scope   Exercise     LLE Hip Scope  DOS 23 Reps/ Time Weight/ Level Comments             Nustep L1x10'                 HS belt stretch 30\"x3   HEP   Calf belt stretch 30\"x3   HEP   LAQ x20   HEP   SLR x20   HEP   Heel slides  x20   HEP   Clamshells x20 Left side               Total Gym heel raises L20x20       Total Gym squats  L20x20             Parallel bars         Slantboard calf stretch 30\"x3        Balance board L2x5'        4 way hip  x20 Green       Mini-squats 2x10     Lunges  2x10     Step up 2x10 4\"    Step down                                             Specific Instructions for next treatment: Progress exercises for HEP    Treatment Charges: Mins Units   []  Modalities     [x]  Ther Exercise 40 3   []  Manual Therapy     []  Ther Activities     []  Neuro Re-ed     []  Vasocompression     [] Gait     []  Other     Total Treatment time 40 3       Assessment: [x] Progressing toward goals.  Minimal progressions made this date due to increased

## 2023-10-31 ENCOUNTER — APPOINTMENT (OUTPATIENT)
Dept: PHYSICAL THERAPY | Facility: CLINIC | Age: 38
End: 2023-10-31
Payer: MEDICARE

## 2023-11-02 ENCOUNTER — HOSPITAL ENCOUNTER (OUTPATIENT)
Dept: PHYSICAL THERAPY | Facility: CLINIC | Age: 38
Setting detail: THERAPIES SERIES
Discharge: HOME | End: 2023-11-02
Payer: MEDICARE

## 2023-11-02 PROCEDURE — 97110 THERAPEUTIC EXERCISES: CPT

## 2023-11-02 NOTE — FLOWSHEET NOTE
[x] [facility]  Outpatient Rehabilitation &  Therapy  [address]   [address]   [place]: [phone]  F: [phone]      Physical Therapy Daily Treatment Note    Date:  2023  Patient Name:  Shana Huang    :  1985  MRN: [id number]  Physician: Dr Rodriguez [place]: [employer] (Auth  20 VS 10/13/23-24)  Medical Diagnosis: LLE Hip pain, scope                   Rehab Codes: G89.18, M62.81 (Muscle Weakness), M62.9 (Disorder of Muscle), M79.1 (Myalgia), Z73.6  Onset date: 23                           Next 's appt. :     Visit# / total visits:    Cancels/No Shows: 0/0    Subjective:    Pain:  [x] Yes  [] No Location: LLE hip pain,  Lateral/anterior          Pain Rating: (0-10 scale) 4/10  Pain altered Tx:  [x] No  [] Yes  Action:  Comments: Patient arrived noting she saw surgeon and is pleased with progress thus far. Objective:  Precautions: FFWB, post-op hip scope   Exercise     LLE Hip Scope  DOS 23 Reps/ Time Weight/ Level Comments             Nustep L1x10'                 SLR x20   HEP   Clamshells x20 Left side     Bridges x20               Total Gym heel raises L20x20       Total Gym squats  L20x20             Parallel bars         Slantboard calf stretch 30\"x3        Balance board L2x5'        4 way hip  x20 Green       Mini-squats 2x10     Lunges  2x10     Step up 2x10 6\"    Step down  2x10 4\"                                         Specific Instructions for next treatment: Progress exercises for HEP    Treatment Charges: Mins Units   []  Modalities     [x]  Ther Exercise 40 3   []  Manual Therapy     []  Ther Activities     []  Neuro Re-ed     []  Vasocompression     [] Gait     []  Other     Total Treatment time 40 3       Assessment: [x] Progressing toward goals. Advanced strength and stability program this date. Patient notes fatigue with progressions with no complaint of pain.  Overall improved stability and

## 2023-11-21 ENCOUNTER — APPOINTMENT (OUTPATIENT)
Dept: GENERAL RADIOLOGY | Facility: CLINIC | Age: 38
End: 2023-11-21
Payer: MEDICARE

## 2023-11-21 ENCOUNTER — HOSPITAL ENCOUNTER (EMERGENCY)
Facility: CLINIC | Age: 38
Discharge: HOME | End: 2023-11-21
Attending: EMERGENCY MEDICINE
Payer: MEDICARE

## 2023-11-21 VITALS
SYSTOLIC BLOOD PRESSURE: 127 MMHG | DIASTOLIC BLOOD PRESSURE: 95 MMHG | TEMPERATURE: 98.1 F | HEART RATE: 108 BPM | HEIGHT: 62 IN | OXYGEN SATURATION: 96 % | RESPIRATION RATE: 18 BRPM | BODY MASS INDEX: 23.92 KG/M2 | WEIGHT: 130 LBS

## 2023-11-21 DIAGNOSIS — M79.672 LEFT FOOT PAIN: Primary | ICD-10-CM

## 2023-11-21 PROCEDURE — 73630 X-RAY EXAM OF FOOT: CPT

## 2023-11-21 PROCEDURE — 99283 EMERGENCY DEPT VISIT LOW MDM: CPT

## 2023-11-21 ASSESSMENT — PAIN DESCRIPTION - ORIENTATION: ORIENTATION: LEFT

## 2023-11-21 ASSESSMENT — PAIN - FUNCTIONAL ASSESSMENT: PAIN_FUNCTIONAL_ASSESSMENT: 0-10

## 2023-11-21 ASSESSMENT — PAIN DESCRIPTION - DESCRIPTORS: DESCRIPTORS: ACHING

## 2023-11-21 ASSESSMENT — PAIN SCALES - GENERAL: PAINLEVEL_OUTOF10: 6

## 2023-11-21 ASSESSMENT — PAIN DESCRIPTION - FREQUENCY: FREQUENCY: CONTINUOUS

## 2023-11-21 ASSESSMENT — ENCOUNTER SYMPTOMS
BACK PAIN: 0
SHORTNESS OF BREATH: 0
VOMITING: 0
ABDOMINAL PAIN: 0
DIARRHEA: 0
NAUSEA: 0
COUGH: 0

## 2023-11-21 ASSESSMENT — PAIN DESCRIPTION - LOCATION: LOCATION: FOOT

## 2023-11-21 ASSESSMENT — PAIN DESCRIPTION - PAIN TYPE: TYPE: CHRONIC PAIN

## 2023-11-21 NOTE — ED PROVIDER NOTES
[facility]  [address]  Phone: [phone]        Pt Name: Reshma Martinez  MRN: [id number]  [facility] 1985  Date of evaluation: 23    [address]       Chief Complaint   Patient presents with    Foot Pain     Pt c/o left foot pain since September. Pt denies injury but states that the pain is worse since hip surgery in September. HISTORY OF PRESENT ILLNESS (Location/Symptom, Timing/Onset, Context/Setting, Quality, Duration, Modifying Factors, Severity)      Reshma Martinez is a 42 y.o. female with no pertinent PMH who presents to the ED via private auto with left foot pain ongoing for the last few months. Patient denies any injury but reports pain to the lateral aspect of her left foot when she walks. She denies any numbness tingling. She has not taken medications for symptoms. States her pain is relieved at rest.  On arrival she is resting on the cot comfortably with even unlabored breaths and nontoxic-appearing no acute distress noted. PAST MEDICAL / SURGICAL / SOCIAL / FAMILY HISTORY     PMH:  has a past medical history of Balance problem, Headache, Meningitis spinal, Seizures ([address]), and Stroke ([address]). Surgical History:  has a past surgical history that includes brain surgery (); shunt removal (); and hip surgery (Left, 2023). Social History:  reports that she has been smoking cigarettes. She has been smoking an average of .5 packs per day. She has never used smokeless tobacco. She reports that she does not drink alcohol and does not use drugs. Family History: She indicated that her mother is alive. She indicated that her father is alive. She indicated that her maternal grandmother is alive. She indicated that her maternal grandfather is . She indicated that her paternal grandmother is . She indicated that her paternal grandfather is .    family history includes Cancer in her maternal grandfather; Heart Attack in

## 2023-11-21 NOTE — DISCHARGE INSTRUCTIONS
Please understand that at this time there is no evidence for a more serious underlying process, but that early in the process of an illness or injury, an emergency department workup can be falsely reassuring. You should contact your family doctor within the next 48 hours for a follow up appointment    [address]!!!    From [facility] and [facility]    On behalf of the Emergency Department staff at [facility]), I would like to thank you for giving us the opportunity to address your health care needs and concerns. We hope that during your visit, our service was delivered in a professional and caring manner. Please keep [facility] in mind as we walk with you down the path to your own personal wellness. Please expect an automated text message or email from us so we can ask a few questions about your health and progress. Based on your answers, a clinician may call you back to offer help and instructions. Please understand that early in the process of an illness or injury, an emergency department workup can be falsely reassuring. If you notice any worsening, changing or persistent symptoms please call your family doctor or return to the ER immediately. Tell us how we did during your visit at [email or web address]   and let us know about your experience

## 2023-11-21 NOTE — ED PROVIDER NOTES
Emergency Department         I reviewed the mid level provider's note and agree with the documented findings and we have discussed the plan of care. I have reviewed the emergency nurses triage note. I agree with the chief complaint, past medical history, past surgical history, allergies, medications, social and family history as documented unless otherwise noted below.        Lissette Allen,   11/21/23 1251

## 2024-01-17 PROBLEM — M79.672 LEFT FOOT PAIN: Status: ACTIVE | Noted: 2024-01-17

## 2024-01-26 NOTE — PROGRESS NOTES
Pt comes in for headaches  Also says that she has had left arm numbness recently    Visit Information    Have you changed or started any medications since your last visit including any over-the-counter medicines, vitamins, or herbal medicines? no   Have you stopped taking any of your medications? Is so, why? -  no  Are you having any side effects from any of your medications? - no    Have you seen any other physician or provider since your last visit?  no   Have you had any other diagnostic tests since your last visit?  no   Have you been seen in the emergency room and/or had an admission in a hospital since we last saw you?  yes - [facility] pinched nerve in April  Have you had your routine dental cleaning in the past 6 months?  no     Do you have an active MyChart account? If no, what is the barrier?   Yes    Patient Care Team:  MIKE Antunez CNP as PCP - General  MIKE Antunez CNP as PCP [facility]  Kennedi Mclean MD as Consulting Physician (Neurology)    Medical History Review  Past Medical, Family, and Social History reviewed and does contribute to the patient presenting condition    Health Maintenance   Topic Date Due    Pneumococcal 0-64 years Vaccine (1 of 1 - PPSV23) 12/02/1989    Varicella Vaccine (1 of 2 - 13+ 2-dose series) 12/02/1996    Cervical cancer screen  12/02/2004    Flu vaccine (1) 09/01/2019    DTaP/Tdap/Td vaccine (2 - Td) 04/05/2022    HIV screen  Completed
Pressure Maternal Grandmother     Cancer Maternal Grandfather           Social History     Tobacco Use    Smoking status: Light Tobacco Smoker     Packs/day: 0.25    Smokeless tobacco: Never Used   Substance Use Topics    Alcohol use: No         Current Outpatient Medications   Medication Sig Dispense Refill    Probiotic Product (ACIDOPHILUS PROBIOTIC) CAPS capsule Take 1 capsule by mouth daily 90 capsule 3    amitriptyline (ELAVIL) 50 MG tablet Take 1 tablet by mouth nightly 90 tablet 1    polyethylene glycol (GLYCOLAX) powder Take 17 g by mouth daily 1530 g 11    cyclobenzaprine (FLEXERIL) 5 MG tablet Take 1 tablet by mouth 2 times daily as needed for Muscle spasms 60 tablet 2    carBAMazepine (CARBATROL) 200 MG extended release capsule TAKE 2 CAPSULES BY MOUTH 2 TIMES DAILY 120 capsule 1    nicotine (NICODERM CQ) 14 MG/24HR PLACE 1 PATCH ONTO THE SKIN DAILY 42 patch 0    albuterol sulfate  (90 Base) MCG/ACT inhaler Inhale 2 puffs into the lungs 4 times daily as needed for Wheezing 3 Inhaler 1    magnesium oxide (MAG-OX) 400 (240 Mg) MG tablet Take 1 tablet by mouth daily 90 tablet 3     No current facility-administered medications for this visit. Allergies   Allergen Reactions    Bactrim [Sulfamethoxazole-Trimethoprim] Hives     hives       Health Maintenance   Topic Date Due    Cervical cancer screen  12/02/2004    Varicella Vaccine (1 of 2 - 13+ 2-dose series) 09/18/2019 (Originally 12/2/1996)    Pneumococcal 0-64 years Vaccine (1 of 1 - PPSV23) 08/28/2020 (Originally 12/2/1989)    Flu vaccine (1) 09/01/2019    DTaP/Tdap/Td vaccine (2 - Td) 04/05/2022    HIV screen  Completed          Review of Systems   Constitutional: Negative for appetite change. HENT: Negative for ear pain. Eyes: Negative. Respiratory: Negative. Negative for cough. Cardiovascular: Negative. Negative for chest pain. Gastrointestinal: Positive for abdominal pain and constipation.    Endocrine:
[Negative] : Heme/Lymph

## 2024-02-27 ENCOUNTER — HOSPITAL ENCOUNTER (OUTPATIENT)
Age: 39
Discharge: HOME | End: 2024-02-29
Payer: MEDICARE

## 2024-02-27 ENCOUNTER — HOSPITAL ENCOUNTER (OUTPATIENT)
Dept: GENERAL RADIOLOGY | Age: 39
Discharge: HOME | End: 2024-02-29
Payer: MEDICARE

## 2024-02-27 DIAGNOSIS — R52 PAIN: ICD-10-CM

## 2024-02-27 PROCEDURE — 71120 X-RAY EXAM BREASTBONE 2/>VWS: CPT

## 2024-02-27 PROCEDURE — 71046 X-RAY EXAM CHEST 2 VIEWS: CPT

## 2024-04-06 ENCOUNTER — HOSPITAL ENCOUNTER (OUTPATIENT)
Dept: GENERAL RADIOLOGY | Age: 39
End: 2024-04-06
Payer: MEDICARE

## 2024-04-06 ENCOUNTER — HOSPITAL ENCOUNTER (OUTPATIENT)
Age: 39
Discharge: HOME | End: 2024-04-06
Payer: MEDICARE

## 2024-04-06 DIAGNOSIS — S80.02XA CONTUSION OF LEFT KNEE, INITIAL ENCOUNTER: ICD-10-CM
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
## 2024-04-06 DIAGNOSIS — E78.5 DYSLIPIDEMIA: ICD-10-CM

## 2024-04-06 DIAGNOSIS — E55.9 VITAMIN D DEFICIENCY: ICD-10-CM

## 2024-04-06 DIAGNOSIS — E83.51 HYPOCALCEMIA: ICD-10-CM

## 2024-04-06 LAB
25(OH)D3 SERPL-MCNC: 18.1 NG/ML (ref 30–100)
ALBUMIN SERPL-MCNC: 4.4 G/DL (ref 3.5–5.2)
ALBUMIN/GLOB SERPL: 2 {RATIO} (ref 1–2.5)
ALP SERPL-CCNC: 123 U/L (ref 35–104)
ALT SERPL-CCNC: 20 U/L (ref 10–35)
ANION GAP SERPL CALCULATED.3IONS-SCNC: 11 MMOL/L (ref 9–16)
AST SERPL-CCNC: 22 U/L (ref 10–35)
BASOPHILS # BLD: 0.03 K/UL (ref 0–0.2)
BASOPHILS NFR BLD: 1 % (ref 0–2)
BILIRUB SERPL-MCNC: 0.2 MG/DL (ref 0–1.2)
BUN SERPL-MCNC: 14 MG/DL (ref 6–20)
CALCIUM SERPL-MCNC: 8.4 MG/DL (ref 8.6–10.4)
CHLORIDE SERPL-SCNC: 108 MMOL/L (ref 98–107)
CHOLEST SERPL-MCNC: 170 MG/DL (ref 0–199)
CHOLESTEROL/HDL RATIO: 3
CO2 SERPL-SCNC: 20 MMOL/L (ref 20–31)
CREAT SERPL-MCNC: 0.6 MG/DL (ref 0.5–0.9)
EOSINOPHIL # BLD: 0.08 K/UL (ref 0–0.44)
EOSINOPHILS RELATIVE PERCENT: 2 % (ref 1–4)
ERYTHROCYTE [DISTWIDTH] IN BLOOD BY AUTOMATED COUNT: 13 % (ref 11.8–14.4)
FOLATE SERPL-MCNC: 7.2 NG/ML (ref 4.8–24.2)
GFR SERPL CREATININE-BSD FRML MDRD: >90 ML/MIN/1.73M2
GLUCOSE SERPL-MCNC: 89 MG/DL (ref 74–99)
HCT VFR BLD AUTO: 40.8 % (ref 36.3–47.1)
HDLC SERPL-MCNC: 54 MG/DL
HGB BLD-MCNC: 14.1 G/DL (ref 11.9–15.1)
IMM GRANULOCYTES # BLD AUTO: 0.05 K/UL (ref 0–0.3)
IMM GRANULOCYTES NFR BLD: 1 %
LDLC SERPL CALC-MCNC: 101 MG/DL (ref 0–100)
LYMPHOCYTES NFR BLD: 1.85 K/UL (ref 1.1–3.7)
LYMPHOCYTES RELATIVE PERCENT: 39 % (ref 24–43)
MCH RBC QN AUTO: 31.8 PG (ref 25.2–33.5)
MCHC RBC AUTO-ENTMCNC: 34.6 G/DL (ref 28.4–34.8)
MCV RBC AUTO: 91.9 FL (ref 82.6–102.9)
MONOCYTES NFR BLD: 0.41 K/UL (ref 0.1–1.2)
MONOCYTES NFR BLD: 9 % (ref 3–12)
NEUTROPHILS NFR BLD: 48 % (ref 36–65)
NEUTS SEG NFR BLD: 2.36 K/UL (ref 1.5–8.1)
NRBC BLD-RTO: 0 PER 100 WBC
PLATELET # BLD AUTO: 261 K/UL (ref 138–453)
PMV BLD AUTO: 9.7 FL (ref 8.1–13.5)
POTASSIUM SERPL-SCNC: 4.1 MMOL/L (ref 3.7–5.3)
PROT SERPL-MCNC: 7.1 G/DL (ref 6.6–8.7)
RBC # BLD AUTO: 4.44 M/UL (ref 3.95–5.11)
SODIUM SERPL-SCNC: 139 MMOL/L (ref 136–145)
TRIGL SERPL-MCNC: 76 MG/DL
VIT B12 SERPL-MCNC: 336 PG/ML (ref 232–1245)
VLDLC SERPL CALC-MCNC: 15 MG/DL
WBC OTHER # BLD: 4.8 K/UL (ref 3.5–11.3)

## 2024-04-06 PROCEDURE — 82746 ASSAY OF FOLIC ACID SERUM: CPT

## 2024-04-06 PROCEDURE — 82306 VITAMIN D 25 HYDROXY: CPT

## 2024-04-06 PROCEDURE — 80061 LIPID PANEL: CPT

## 2024-04-06 PROCEDURE — 36415 COLL VENOUS BLD VENIPUNCTURE: CPT

## 2024-04-06 PROCEDURE — 73562 X-RAY EXAM OF KNEE 3: CPT

## 2024-04-06 PROCEDURE — 80053 COMPREHEN METABOLIC PANEL: CPT

## 2024-04-06 PROCEDURE — 84207 ASSAY OF VITAMIN B-6: CPT

## 2024-04-06 PROCEDURE — 85025 COMPLETE CBC W/AUTO DIFF WBC: CPT

## 2024-04-06 PROCEDURE — 82607 VITAMIN B-12: CPT

## 2024-04-09 LAB — PYRIDOXAL PHOS SERPL-SCNC: 5.8 NMOL/L (ref 20–125)

## 2024-04-16 PROBLEM — I69.051: Status: ACTIVE | Noted: 2024-04-16

## 2024-04-25 PROBLEM — E53.1 VITAMIN B6 DEFICIENCY: Status: ACTIVE | Noted: 2024-04-25

## 2024-04-30 PROBLEM — R79.89 LOW VITAMIN B12 LEVEL: Status: ACTIVE | Noted: 2024-04-30

## 2024-05-14 ENCOUNTER — HOSPITAL ENCOUNTER (OUTPATIENT)
Dept: MRI IMAGING | Age: 39
Discharge: HOME | End: 2024-05-16
Attending: PSYCHIATRY & NEUROLOGY
Payer: MEDICARE

## 2024-05-14 DIAGNOSIS — G43.719 INTRACTABLE CHRONIC MIGRAINE WITHOUT AURA AND WITHOUT STATUS MIGRAINOSUS: ICD-10-CM

## 2024-05-14 LAB
CREAT SERPL-MCNC: 0.7 MG/DL (ref 0.5–0.9)
GFR, ESTIMATED: >90 ML/MIN/1.73M2

## 2024-05-14 PROCEDURE — 82565 ASSAY OF CREATININE: CPT

## 2024-05-14 PROCEDURE — 36415 COLL VENOUS BLD VENIPUNCTURE: CPT

## 2024-05-14 PROCEDURE — 6360000004 HC RX CONTRAST MEDICATION: Performed by: PSYCHIATRY & NEUROLOGY

## 2024-05-14 PROCEDURE — A9579 GAD-BASE MR CONTRAST NOS,1ML: HCPCS | Performed by: PSYCHIATRY & NEUROLOGY

## 2024-05-14 PROCEDURE — 70553 MRI BRAIN STEM W/O & W/DYE: CPT

## 2024-05-14 RX ADMIN — GADOTERIDOL 11 ML: 279.3 INJECTION, SOLUTION INTRAVENOUS at 08:29

## 2024-06-21 PROBLEM — S90.219A CONTUSION OF GREAT TOE WITH DAMAGE TO NAIL: Status: ACTIVE | Noted: 2024-06-21

## 2024-07-24 ENCOUNTER — HOSPITAL ENCOUNTER (OUTPATIENT)
Age: 39
Discharge: HOME | End: 2024-07-24
Payer: MEDICARE

## 2024-07-24 DIAGNOSIS — E55.9 VITAMIN D DEFICIENCY: ICD-10-CM

## 2024-07-24 LAB — 25(OH)D3 SERPL-MCNC: 48.8 NG/ML (ref 30–100)

## 2024-07-24 PROCEDURE — 82306 VITAMIN D 25 HYDROXY: CPT

## 2024-07-24 PROCEDURE — 36415 COLL VENOUS BLD VENIPUNCTURE: CPT
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
## 2024-09-28 ENCOUNTER — HOSPITAL ENCOUNTER (OUTPATIENT)
Dept: GENERAL RADIOLOGY | Age: 39
Discharge: HOME | End: 2024-09-30
Payer: MEDICARE

## 2024-09-28 ENCOUNTER — HOSPITAL ENCOUNTER (OUTPATIENT)
Age: 39
Discharge: HOME | End: 2024-09-30
Payer: MEDICARE

## 2024-09-28 DIAGNOSIS — M25.852 LEFT HIP IMPINGEMENT SYNDROME: ICD-10-CM

## 2024-09-28 PROCEDURE — 73502 X-RAY EXAM HIP UNI 2-3 VIEWS: CPT

## 2024-10-13 ENCOUNTER — HOSPITAL ENCOUNTER (EMERGENCY)
Facility: CLINIC | Age: 39
Discharge: HOME | End: 2024-10-13
Attending: EMERGENCY MEDICINE
Payer: MEDICARE

## 2024-10-13 VITALS
TEMPERATURE: 98.3 F | WEIGHT: 120 LBS | SYSTOLIC BLOOD PRESSURE: 148 MMHG | HEART RATE: 101 BPM | BODY MASS INDEX: 22.08 KG/M2 | HEIGHT: 62 IN | DIASTOLIC BLOOD PRESSURE: 97 MMHG | OXYGEN SATURATION: 100 % | RESPIRATION RATE: 16 BRPM

## 2024-10-13 DIAGNOSIS — N30.00 ACUTE CYSTITIS WITHOUT HEMATURIA: Primary | ICD-10-CM

## 2024-10-13 LAB
BACTERIA URNS QL MICRO: ABNORMAL
BILIRUB UR QL STRIP: NEGATIVE
CHARACTER UR: ABNORMAL
CLARITY UR: ABNORMAL
COLOR UR: YELLOW
EPI CELLS #/AREA URNS HPF: ABNORMAL /HPF (ref 0–5)
GLUCOSE UR STRIP-MCNC: NEGATIVE MG/DL
HGB UR QL STRIP.AUTO: NEGATIVE
KETONES UR STRIP-MCNC: NEGATIVE MG/DL
LEUKOCYTE ESTERASE UR QL STRIP: ABNORMAL
NITRITE UR QL STRIP: NEGATIVE
PH UR STRIP: 5.5 [PH] (ref 5–8)
PROT UR STRIP-MCNC: ABNORMAL MG/DL
RBC #/AREA URNS HPF: ABNORMAL /HPF (ref 0–2)
SP GR UR STRIP: 1.03 (ref 1–1.03)
UROBILINOGEN UR STRIP-ACNC: NORMAL EU/DL (ref 0–1)
WBC #/AREA URNS HPF: ABNORMAL /HPF (ref 0–5)

## 2024-10-13 PROCEDURE — 81001 URINALYSIS AUTO W/SCOPE: CPT

## 2024-10-13 PROCEDURE — 99283 EMERGENCY DEPT VISIT LOW MDM: CPT

## 2024-10-13 RX ORDER — NITROFURANTOIN 25; 75 MG/1; MG/1
100 CAPSULE ORAL 2 TIMES DAILY
Qty: 14 CAPSULE | Refills: 0 | Status: SHIPPED | OUTPATIENT
Start: 2024-10-13

## 2024-10-13 ASSESSMENT — PAIN - FUNCTIONAL ASSESSMENT: PAIN_FUNCTIONAL_ASSESSMENT: NONE - DENIES PAIN

## 2024-10-13 NOTE — ED PROVIDER NOTES
[facility]  EMERGENCY [facility]PART[facility] ENCOUNTER      Pt Name: Charlotte Han  MRN: [id number]  Birthdate 1985  Date of evaluation: 10/13/2024  Provider: Maxim Brambila MD    CHIEF COMPLAINT     Chief Complaint   Patient presents with    Dysuria     Friday, lower abdominal pain         HISTORY OF PRESENT ILLNESS   (Location/Symptom, Timing/Onset, Context/Setting,Quality, Duration, Modifying Factors, Severity)  Note limiting factors.   Charlotte Han is a 38 y.o. female who presents to the emergency department with a 2-day history of urinary urgency and frequency.  She denies fever, nausea or vomiting.    The history is provided by the patient.       Nursing Notes werereviewed.    REVIEW OF SYSTEMS    (2-9 systems for level 4, 10 or more for level 5)     Review of Systems   All other systems reviewed and are negative.      Except as noted above the remainder of the review of systems was reviewed and negative.       PAST MEDICAL HISTORY     Past Medical History:   Diagnosis Date    Balance problem     Headache     Meningitis spinal     Seizures (HCC)     last seizure 2020, Sees  every 6 months    Stroke (HCC)     at 2 months of age-left side weakness         SURGICALHISTORY       Past Surgical History:   Procedure Laterality Date    BRAIN SURGERY  2006    cyst and fluid build up. 3 surgeries in 2006, 2 surgeries in 2009    HIP SURGERY Left 9/26/2023    LEFT HIP ARTHROSCOPY, OSTEOCHONDROPLASTY AND IT BAND RELEASE performed by Trip Tomas DO at [facility] OR    SHUNT REMOVAL  2010         CURRENT MEDICATIONS       Discharge Medication List as of 10/13/2024  4:30 PM        CONTINUE these medications which have NOT CHANGED    Details   amitriptyline (ELAVIL) 75 MG tablet Take 1 tablet by mouth nightly, Disp-90 tablet, R-0Normal      atorvastatin (LIPITOR) 10 MG tablet Take 1 tablet by mouth daily TAKE 1 TABLET BY MOUTH EVERY DAY, Disp-90 tablet, R-0Normal      Cyanocobalamin (VITAMIN B-12) 1000 MCG

## 2024-10-29 PROBLEM — N30.90 CYSTITIS: Status: ACTIVE | Noted: 2024-10-29

## 2024-12-18 PROBLEM — Z12.31 ENCOUNTER FOR SCREENING MAMMOGRAM FOR MALIGNANT NEOPLASM OF BREAST: Status: ACTIVE | Noted: 2024-12-18

## 2024-12-18 PROBLEM — Z00.00 MEDICARE ANNUAL WELLNESS VISIT, SUBSEQUENT: Status: ACTIVE | Noted: 2024-12-18

## 2024-12-20 ENCOUNTER — HOSPITAL ENCOUNTER (OUTPATIENT)
Age: 39
Discharge: HOME | End: 2024-12-20
Payer: MEDICARE

## 2024-12-20 DIAGNOSIS — R79.89 LOW VITAMIN B12 LEVEL: ICD-10-CM

## 2024-12-20 DIAGNOSIS — R53.83 OTHER FATIGUE: ICD-10-CM

## 2024-12-20 DIAGNOSIS — E78.5 DYSLIPIDEMIA: ICD-10-CM

## 2024-12-20 DIAGNOSIS — E55.9 VITAMIN D DEFICIENCY: ICD-10-CM

## 2024-12-20 LAB
25(OH)D3 SERPL-MCNC: 33.6 NG/ML (ref 30–100)
ALBUMIN SERPL-MCNC: 4.5 G/DL (ref 3.5–5.2)
ALBUMIN/GLOB SERPL: 1.6 {RATIO} (ref 1–2.5)
ALP SERPL-CCNC: 109 U/L (ref 35–104)
ALT SERPL-CCNC: 41 U/L (ref 10–35)
ANION GAP SERPL CALCULATED.3IONS-SCNC: 10 MMOL/L (ref 9–16)
AST SERPL-CCNC: 28 U/L (ref 10–35)
BASOPHILS # BLD: 0.03 K/UL (ref 0–0.2)
BASOPHILS NFR BLD: 1 % (ref 0–2)
BILIRUB SERPL-MCNC: 0.2 MG/DL (ref 0–1.2)
BUN SERPL-MCNC: 18 MG/DL (ref 6–20)
CALCIUM SERPL-MCNC: 9 MG/DL (ref 8.6–10.4)
CHLORIDE SERPL-SCNC: 107 MMOL/L (ref 98–107)
CHOLEST SERPL-MCNC: 172 MG/DL (ref 0–199)
CHOLESTEROL/HDL RATIO: 3.3
CO2 SERPL-SCNC: 21 MMOL/L (ref 20–31)
CREAT SERPL-MCNC: 0.7 MG/DL (ref 0.6–0.9)
EOSINOPHIL # BLD: 0.08 K/UL (ref 0–0.44)
EOSINOPHILS RELATIVE PERCENT: 2 % (ref 1–4)
ERYTHROCYTE [DISTWIDTH] IN BLOOD BY AUTOMATED COUNT: 12.8 % (ref 11.8–14.4)
FOLATE SERPL-MCNC: 4.8 NG/ML (ref 4.8–24.2)
GFR, ESTIMATED: >90 ML/MIN/1.73M2
GLUCOSE SERPL-MCNC: 73 MG/DL (ref 74–99)
HCT VFR BLD AUTO: 42 % (ref 36.3–47.1)
HDLC SERPL-MCNC: 52 MG/DL
HGB BLD-MCNC: 14 G/DL (ref 11.9–15.1)
IMM GRANULOCYTES # BLD AUTO: <0.03 K/UL (ref 0–0.3)
IMM GRANULOCYTES NFR BLD: 0 %
LDLC SERPL CALC-MCNC: 102 MG/DL (ref 0–100)
LYMPHOCYTES NFR BLD: 1.65 K/UL (ref 1.1–3.7)
LYMPHOCYTES RELATIVE PERCENT: 35 % (ref 24–43)
MCH RBC QN AUTO: 31.7 PG (ref 25.2–33.5)
MCHC RBC AUTO-ENTMCNC: 33.3 G/DL (ref 28.4–34.8)
MCV RBC AUTO: 95 FL (ref 82.6–102.9)
MONOCYTES NFR BLD: 0.41 K/UL (ref 0.1–1.2)
MONOCYTES NFR BLD: 9 % (ref 3–12)
NEUTROPHILS NFR BLD: 53 % (ref 36–65)
NEUTS SEG NFR BLD: 2.47 K/UL (ref 1.5–8.1)
NRBC BLD-RTO: 0 PER 100 WBC
PLATELET # BLD AUTO: 261 K/UL (ref 138–453)
PMV BLD AUTO: 9.7 FL (ref 8.1–13.5)
POTASSIUM SERPL-SCNC: 3.9 MMOL/L (ref 3.7–5.3)
PROT SERPL-MCNC: 7.4 G/DL (ref 6.6–8.7)
RBC # BLD AUTO: 4.42 M/UL (ref 3.95–5.11)
SODIUM SERPL-SCNC: 138 MMOL/L (ref 136–145)
TRIGL SERPL-MCNC: 89 MG/DL
TSH SERPL DL<=0.05 MIU/L-ACNC: 2.05 UIU/ML (ref 0.27–4.2)
VIT B12 SERPL-MCNC: >2000 PG/ML (ref 232–1245)
VLDLC SERPL CALC-MCNC: 18 MG/DL (ref 1–30)
WBC OTHER # BLD: 4.7 K/UL (ref 3.5–11.3)

## 2024-12-20 PROCEDURE — 85025 COMPLETE CBC W/AUTO DIFF WBC: CPT

## 2024-12-20 PROCEDURE — 82746 ASSAY OF FOLIC ACID SERUM: CPT

## 2024-12-20 PROCEDURE — 80053 COMPREHEN METABOLIC PANEL: CPT

## 2024-12-20 PROCEDURE — 82306 VITAMIN D 25 HYDROXY: CPT

## 2024-12-20 PROCEDURE — 80061 LIPID PANEL: CPT

## 2024-12-20 PROCEDURE — 36415 COLL VENOUS BLD VENIPUNCTURE: CPT

## 2024-12-20 PROCEDURE — 84443 ASSAY THYROID STIM HORMONE: CPT

## 2024-12-20 PROCEDURE — 82607 VITAMIN B-12: CPT

## 2025-01-17 PROBLEM — Z00.00 MEDICARE ANNUAL WELLNESS VISIT, SUBSEQUENT: Status: RESOLVED | Noted: 2024-12-18 | Resolved: 2025-01-17

## 2025-01-17 PROBLEM — Z12.31 ENCOUNTER FOR SCREENING MAMMOGRAM FOR MALIGNANT NEOPLASM OF BREAST: Status: RESOLVED | Noted: 2024-12-18 | Resolved: 2025-01-17

## 2025-02-09 ENCOUNTER — HOSPITAL ENCOUNTER (EMERGENCY)
Facility: CLINIC | Age: 40
Discharge: HOME | End: 2025-02-09
Attending: EMERGENCY MEDICINE
Payer: MEDICARE

## 2025-02-09 ENCOUNTER — APPOINTMENT (OUTPATIENT)
Dept: GENERAL RADIOLOGY | Facility: CLINIC | Age: 40
End: 2025-02-09
Payer: MEDICARE

## 2025-02-09 VITALS
OXYGEN SATURATION: 100 % | HEART RATE: 97 BPM | RESPIRATION RATE: 18 BRPM | SYSTOLIC BLOOD PRESSURE: 132 MMHG | TEMPERATURE: 98.1 F | BODY MASS INDEX: 22.08 KG/M2 | HEIGHT: 62 IN | WEIGHT: 120 LBS | DIASTOLIC BLOOD PRESSURE: 77 MMHG

## 2025-02-09 DIAGNOSIS — R07.89 CHEST WALL PAIN: Primary | ICD-10-CM

## 2025-02-09 DIAGNOSIS — K59.00 CONSTIPATION, UNSPECIFIED CONSTIPATION TYPE: ICD-10-CM

## 2025-02-09 LAB
ANION GAP SERPL CALCULATED.3IONS-SCNC: 11 MMOL/L (ref 9–16)
BASOPHILS # BLD: 0 K/UL (ref 0–0.2)
BASOPHILS NFR BLD: 1 % (ref 0–2)
BUN SERPL-MCNC: 14 MG/DL (ref 6–20)
CALCIUM SERPL-MCNC: 9 MG/DL (ref 8.6–10.4)
CHLORIDE SERPL-SCNC: 103 MMOL/L (ref 98–107)
CO2 SERPL-SCNC: 21 MMOL/L (ref 20–31)
CREAT SERPL-MCNC: 0.7 MG/DL (ref 0.5–0.9)
EOSINOPHIL # BLD: 0.1 K/UL (ref 0–0.4)
EOSINOPHILS RELATIVE PERCENT: 2 % (ref 1–4)
ERYTHROCYTE [DISTWIDTH] IN BLOOD BY AUTOMATED COUNT: 13 % (ref 12.5–15.4)
GFR, ESTIMATED: >90 ML/MIN/1.73M2
GLUCOSE SERPL-MCNC: 90 MG/DL (ref 74–99)
HCT VFR BLD AUTO: 41.3 % (ref 36–46)
HGB BLD-MCNC: 14 G/DL (ref 12–16)
LYMPHOCYTES NFR BLD: 1.4 K/UL (ref 1–4.8)
LYMPHOCYTES RELATIVE PERCENT: 36 % (ref 24–44)
MCH RBC QN AUTO: 31.5 PG (ref 26–34)
MCHC RBC AUTO-ENTMCNC: 33.8 G/DL (ref 31–37)
MCV RBC AUTO: 93.2 FL (ref 80–100)
MONOCYTES NFR BLD: 0.4 K/UL (ref 0.1–1.2)
MONOCYTES NFR BLD: 10 % (ref 2–11)
NEUTROPHILS NFR BLD: 51 % (ref 36–66)
NEUTS SEG NFR BLD: 2.1 K/UL (ref 1.8–7.7)
PLATELET # BLD AUTO: 251 K/UL (ref 140–450)
PMV BLD AUTO: 6.9 FL (ref 6–12)
POTASSIUM SERPL-SCNC: 4 MMOL/L (ref 3.7–5.3)
RBC # BLD AUTO: 4.43 M/UL (ref 4–5.2)
SODIUM SERPL-SCNC: 135 MMOL/L (ref 136–145)
TROPONIN I SERPL HS-MCNC: <6 NG/L (ref 0–14)
WBC OTHER # BLD: 4.1 K/UL (ref 3.5–11)

## 2025-02-09 PROCEDURE — 36415 COLL VENOUS BLD VENIPUNCTURE: CPT

## 2025-02-09 PROCEDURE — 80048 BASIC METABOLIC PNL TOTAL CA: CPT

## 2025-02-09 PROCEDURE — 71045 X-RAY EXAM CHEST 1 VIEW: CPT

## 2025-02-09 PROCEDURE — 85025 COMPLETE CBC W/AUTO DIFF WBC: CPT

## 2025-02-09 PROCEDURE — 99285 EMERGENCY DEPT VISIT HI MDM: CPT

## 2025-02-09 PROCEDURE — 84484 ASSAY OF TROPONIN QUANT: CPT

## 2025-02-09 PROCEDURE — 93005 ELECTROCARDIOGRAM TRACING: CPT

## 2025-02-09 PROCEDURE — 6370000000 HC RX 637 (ALT 250 FOR IP)

## 2025-02-09 PROCEDURE — 74018 RADEX ABDOMEN 1 VIEW: CPT

## 2025-02-09 RX ORDER — FAMOTIDINE 20 MG/1
20 TABLET, FILM COATED ORAL 2 TIMES DAILY
Qty: 14 TABLET | Refills: 0 | Status: SHIPPED | OUTPATIENT
Start: 2025-02-09 | End: 2025-02-16

## 2025-02-09 RX ADMIN — LIDOCAINE HYDROCHLORIDE: 20 SOLUTION ORAL at 11:32

## 2025-02-09 RX ADMIN — MAJOR MAGNESIUM CITRATE ORAL SOLUTION - LEMON 296 ML: 1.75 LIQUID ORAL at 12:30

## 2025-02-09 ASSESSMENT — LIFESTYLE VARIABLES
HOW MANY STANDARD DRINKS CONTAINING ALCOHOL DO YOU HAVE ON A TYPICAL DAY: PATIENT DOES NOT DRINK
HOW OFTEN DO YOU HAVE A DRINK CONTAINING ALCOHOL: NEVER

## 2025-02-09 ASSESSMENT — PAIN DESCRIPTION - LOCATION: LOCATION: CHEST

## 2025-02-09 ASSESSMENT — PAIN - FUNCTIONAL ASSESSMENT: PAIN_FUNCTIONAL_ASSESSMENT: 0-10

## 2025-02-09 ASSESSMENT — PAIN SCALES - GENERAL: PAINLEVEL_OUTOF10: 7

## 2025-02-09 NOTE — ED PROVIDER NOTES
[facility]  [address] [facility] [address]  Phone: [phone]      Attending Physician Attestation          CHIEF COMPLAINT       Chief Complaint   Patient presents with    Chest Pain     Intermittent for two weeks, woke her up at 0530.        DIAGNOSTIC RESULTS     LABS:  Labs Reviewed   CBC WITH AUTO DIFFERENTIAL   BASIC METABOLIC PANEL   TROPONIN       All other labs were within normal range or not returned as of this dictation.    RADIOLOGY:  XR CHEST PORTABLE   Final Result   No acute cardiopulmonary process.         XR ABDOMEN (KUB) (SINGLE AP VIEW)   Final Result   Moderate amount stool in the colon.               EMERGENCY DEPARTMENT COURSE:   Vitals:    Vitals:    02/09/25 1106   BP: 132/77   Pulse: 97   Resp: 18   Temp: 98.1 °F (36.7 °C)   TempSrc: Oral   SpO2: 100%   Weight: 54.4 kg (120 lb)   Height: 1.575 m (5' 2\")     -------------------------  BP: 132/77, Temp: 98.1 °F (36.7 °C), Pulse: 97, Respirations: 18      ED Course as of 02/09/25 1201   Sun Feb 09, 2025   1200 KG showing a sinus rhythm.  No ST elevation or depression.  No T wave inversion.  Right axis.  No signs of ischemia. [NA]      ED Course User Index  [NA] Arely Jones MD         PERTINENT ATTENDING PHYSICIAN COMMENTS:    I performed a history and physical examination of the patient and discussed management with the mid level provider. I reviewed the mid level provider's note and agree with the documented findings and plan of care. Any areas of disagreement are noted on the chart. I was personally present for the key portions of any procedures. I have documented in the chart those procedures where I was not present during the key portions. I have reviewed the emergency nurses triage note. I agree with the chief complaint, past medical history, past surgical history, allergies, medications, social and family history as documented unless otherwise noted below. Documentation of the HPI, Physical 
colon.  I am going to prescribe her some mag citrate dose here.  Have her follow-up with her primary care physician next 2 to 3 days.  I did give her a GI cocktail. [JM]      ED Course User Index  [JM] Juan Carlos Verdugo APRN - NP  [NA] Arely Jones MD     Patient reported some relief with the GI cocktail of her pain.  This is likely GERD type symptoms.  Her troponin was negative.  Twelve-lead EKG was negative.  Chest x-ray did not show any acute cardiopulmonary process.  Twice for constipation concerns about the moderate amount of stool within the colon.  I gave her a bottle of mag citrate to drink here before discharge.  I did recommend that she follow-up with her primary care physician.  I am going to send in a prescription for a week of Pepcid the patient is agreeable with plan of care and discharge at this time.    CRITICAL CARE TIME       CONSULTS:  None    PROCEDURES:  Unless otherwise noted below, none     Procedures        FINAL IMPRESSION      1. Chest wall pain    2. Constipation, unspecified constipation type          DISPOSITION/PLAN   DISPOSITION Decision To Discharge 02/09/2025 12:19:32 PM   DISPOSITION CONDITION Stable           PATIENT REFERRED TO:  Steven Bernard MD  [address]   [address]  [facility] [address] [phone]    Schedule an appointment as soon as possible for a visit in 2 days        DISCHARGE MEDICATIONS:  New Prescriptions    FAMOTIDINE (PEPCID) 20 MG TABLET    Take 1 tablet by mouth 2 times daily for 7 days     Controlled Substances Monitoring:          No data to display                (Please note that portions of this note were completed with a voice recognition program.  Efforts were made to edit the dictations but occasionally words are mis-transcribed.)    This note was created with the assistance of a speech recognition program. While intending to generate a timely document that accurately reflects the content of the visit, no guarantee can

## 2025-02-10 LAB
EKG ATRIAL RATE: 87 BPM
EKG P AXIS: 66 DEGREES
EKG P-R INTERVAL: 156 MS
EKG Q-T INTERVAL: 358 MS
EKG QRS DURATION: 74 MS
EKG QTC CALCULATION (BAZETT): 430 MS
EKG R AXIS: 91 DEGREES
EKG T AXIS: 57 DEGREES
EKG VENTRICULAR RATE: 87 BPM

## 2025-02-18 PROBLEM — R10.13 DYSPEPSIA: Status: ACTIVE | Noted: 2025-02-18

## 2025-02-18 PROBLEM — E87.1 HYPONATREMIA: Status: ACTIVE | Noted: 2025-02-18

## 2025-02-22 ENCOUNTER — HOSPITAL ENCOUNTER (OUTPATIENT)
Facility: CLINIC | Age: 40
Discharge: HOME | End: 2025-02-22
Payer: MEDICARE

## 2025-02-22 DIAGNOSIS — R53.83 OTHER FATIGUE: ICD-10-CM

## 2025-02-22 LAB
ALBUMIN SERPL-MCNC: 4.4 G/DL (ref 3.5–5.2)
ALBUMIN/GLOB SERPL: 1.3 {RATIO} (ref 1–2.5)
ALP SERPL-CCNC: 100 U/L (ref 35–104)
ALT SERPL-CCNC: 39 U/L (ref 10–35)
ANION GAP SERPL CALCULATED.3IONS-SCNC: 11 MMOL/L (ref 9–16)
AST SERPL-CCNC: 30 U/L (ref 10–35)
BILIRUB SERPL-MCNC: 0.2 MG/DL (ref 0–1.2)
BUN SERPL-MCNC: 22 MG/DL (ref 6–20)
CALCIUM SERPL-MCNC: 8.7 MG/DL (ref 8.6–10.4)
CHLORIDE SERPL-SCNC: 106 MMOL/L (ref 98–107)
CO2 SERPL-SCNC: 22 MMOL/L (ref 20–31)
CREAT SERPL-MCNC: 0.7 MG/DL (ref 0.6–0.9)
GFR, ESTIMATED: >90 ML/MIN/1.73M2
GLUCOSE P FAST SERPL-MCNC: 87 MG/DL (ref 74–99)
POTASSIUM SERPL-SCNC: 4.1 MMOL/L (ref 3.7–5.3)
PROT SERPL-MCNC: 7.7 G/DL (ref 6.6–8.7)
SODIUM SERPL-SCNC: 139 MMOL/L (ref 136–145)

## 2025-02-22 PROCEDURE — 36415 COLL VENOUS BLD VENIPUNCTURE: CPT

## 2025-02-22 PROCEDURE — 80053 COMPREHEN METABOLIC PANEL: CPT

## 2025-03-21 PROBLEM — R74.01 ALT (SGPT) LEVEL RAISED: Status: ACTIVE | Noted: 2021-11-18

## 2025-05-02 ENCOUNTER — HOSPITAL ENCOUNTER (OUTPATIENT)
Facility: CLINIC | Age: 40
Discharge: HOME | End: 2025-05-02
Payer: MEDICARE

## 2025-05-02 DIAGNOSIS — E53.1 VITAMIN B6 DEFICIENCY: ICD-10-CM

## 2025-05-02 PROCEDURE — 36415 COLL VENOUS BLD VENIPUNCTURE: CPT

## 2025-05-02 PROCEDURE — 84207 ASSAY OF VITAMIN B-6: CPT

## 2025-05-07 LAB — PYRIDOXAL PHOS SERPL-SCNC: 172.7 NMOL/L (ref 20–125)

## (undated) DEVICE — NEEDLE SUT HIP LEN SCORPION

## (undated) DEVICE — BOOT INSERT PAD

## (undated) DEVICE — C-ARM: Brand: UNBRANDED

## (undated) DEVICE — SURGICAL PROCEDURE KIT BASIN MAJ SET UP

## (undated) DEVICE — DRAPE,U/ SHT,SPLIT,PLAS,STERIL: Brand: MEDLINE

## (undated) DEVICE — TUBING PMP L16FT MAIN DISP FOR AR-6400 AR-6475

## (undated) DEVICE — XL AGGRESSIVE PLUS, HIP CUTTER. ARTHROSCOPIC SHAVER BLADE. FOR USE WITH: REF 0375-701-500, 0375-704-500, 0375-708-500. DO NOT USE IF PACKAGE IS DAMAGED, KEEP DRY, KEEP AWAY FROM SUNLIGHT: Brand: FORMULA

## (undated) DEVICE — KIT ARTHRO HIP W/ BAN BLDE DISP

## (undated) DEVICE — 1LYRTR 16FR10ML 100%SILI SNAP: Brand: MEDLINE INDUSTRIES, INC.

## (undated) DEVICE — GAUZE,SPONGE,FLUFF,6"X6.75",STRL,5/TRAY: Brand: MEDLINE

## (undated) DEVICE — DISC SUCT FLR DLX QUICKSUITE

## (undated) DEVICE — 6619 2 PTNT ISO SYS INCISE AREA&LT;(&GT;&&LT;)&GT;P: Brand: STERI-DRAPE™ IOBAN™ 2

## (undated) DEVICE — GLOVE SURG SZ 85 L12IN FNGR ORTHO 126MIL CRM LTX FREE

## (undated) DEVICE — 3M™ STERI-DRAPE™ U-DRAPE 1015: Brand: STERI-DRAPE™

## (undated) DEVICE — SYRINGE MED 30ML STD CLR PLAS LUERLOCK TIP N CTRL DISP

## (undated) DEVICE — DRAPE,REIN 53X77,STERILE: Brand: MEDLINE

## (undated) DEVICE — APPLICATOR MEDICATED 26 CC SOLUTION HI LT ORNG CHLORAPREP

## (undated) DEVICE — LEGGINGS, PAIR, 33X51 XL, STERILE: Brand: MEDLINE

## (undated) DEVICE — PAD,ABDOMINAL,5"X9",ST,LF,25/BX: Brand: MEDLINE INDUSTRIES, INC.

## (undated) DEVICE — XL, HIP 8-FLUTE BARREL BUR. ARTHROSCOPIC SHAVER BLADE. FOR USE WITH: REF 0375-701-500, 0375-704-500, 0375-708-500. DO NOT USE IF PACKAGE IS DAMAGED, KEEP DRY, KEEP AWAY FROM SUNLIGHT: Brand: FORMULA

## (undated) DEVICE — POUCH INSTR W6.75XL11.5IN FRST 2 PKT ADH FOR ORTH AND

## (undated) DEVICE — GOWN,AURORA,NON-REINFORCED,2XL: Brand: MEDLINE

## (undated) DEVICE — DRESSING PETRO W3XL8IN OIL EMUL N ADH GZ KNIT IMPREG CELOS

## (undated) DEVICE — TUBING FLD MGMT Y DBL SPIK DUALWAVE

## (undated) DEVICE — SOLUTION IRRIG 3000ML 0.9% SOD CHL USP UROMATIC PLAS CONT

## (undated) DEVICE — 3.75 MM INTEGRATED CABLE WAND ICW,                                    MULTIVAC 50 XL, 50 DEGREE: Brand: COBLATION

## (undated) DEVICE — INTENDED FOR TISSUE SEPARATION, AND OTHER PROCEDURES THAT REQUIRE A SHARP SURGICAL BLADE TO PUNCTURE OR CUT.: Brand: BARD-PARKER ® CARBON RIB-BACK BLADES

## (undated) DEVICE — 4-PORT MANIFOLD: Brand: NEPTUNE 2

## (undated) DEVICE — BLANKET WRM W29.9XL79.1IN UP BODY FORC AIR MISTRAL-AIR

## (undated) DEVICE — TUBING, SUCTION, 1/4" X 12', STRAIGHT: Brand: MEDLINE

## (undated) DEVICE — Device

## (undated) DEVICE — NEEDLE SUT CAPSULECLOSE SCORPION

## (undated) DEVICE — LIQUIBAND RAPID ADHESIVE 36/CS 0.8ML: Brand: MEDLINE

## (undated) DEVICE — CANNULA ARTHSCP L9CM DIA8.25MM TRNSLUC THRD FLX W/ NO

## (undated) DEVICE — STRIP,CLOSURE,WOUND,MEDI-STRIP,1/2X4: Brand: MEDLINE

## (undated) DEVICE — TOWEL,OR,DSP,ST,BLUE,DLX,XR,4/PK,20PK/CS: Brand: MEDLINE

## (undated) DEVICE — SUTURE FIBERWIRE SZ 2 W/ TAPERED NEEDLE BLUE L38IN NONABSORB BLU L26.5MM 1/2 CIRCLE AR7200

## (undated) DEVICE — COVER,MAYO STAND,XL,STERILE: Brand: MEDLINE

## (undated) DEVICE — BANDAGE COBAN 4 IN COMPR W4INXL5YD FOAM COHESIVE QUIK STK SELF ADH SFT

## (undated) DEVICE — SHEET, ORTHO, SPLIT, STERILE: Brand: MEDLINE

## (undated) DEVICE — CANNULA ARTHSCP L11CM DIA8.25MM PARTIALLY THRD FLX NO

## (undated) DEVICE — SUTURE ETHLN SZ 3-0 L18IN NONABSORBABLE BLK PS-2 L19MM 3/8 1669H